# Patient Record
Sex: FEMALE | Race: WHITE | NOT HISPANIC OR LATINO | Employment: OTHER | URBAN - METROPOLITAN AREA
[De-identification: names, ages, dates, MRNs, and addresses within clinical notes are randomized per-mention and may not be internally consistent; named-entity substitution may affect disease eponyms.]

---

## 2018-06-14 ENCOUNTER — APPOINTMENT (OUTPATIENT)
Dept: WOMENS IMAGING | Facility: HOSPITAL | Age: 73
End: 2018-06-14

## 2018-06-14 PROCEDURE — 77067 SCR MAMMO BI INCL CAD: CPT | Performed by: RADIOLOGY

## 2018-06-14 PROCEDURE — 77063 BREAST TOMOSYNTHESIS BI: CPT | Performed by: RADIOLOGY

## 2019-07-09 ENCOUNTER — APPOINTMENT (OUTPATIENT)
Dept: WOMENS IMAGING | Facility: HOSPITAL | Age: 74
End: 2019-07-09

## 2019-07-09 PROCEDURE — 77063 BREAST TOMOSYNTHESIS BI: CPT | Performed by: RADIOLOGY

## 2019-07-09 PROCEDURE — 77067 SCR MAMMO BI INCL CAD: CPT | Performed by: RADIOLOGY

## 2020-08-06 ENCOUNTER — APPOINTMENT (OUTPATIENT)
Dept: WOMENS IMAGING | Facility: HOSPITAL | Age: 75
End: 2020-08-06

## 2020-08-06 PROCEDURE — 77067 SCR MAMMO BI INCL CAD: CPT | Performed by: RADIOLOGY

## 2020-08-06 PROCEDURE — 77063 BREAST TOMOSYNTHESIS BI: CPT | Performed by: RADIOLOGY

## 2021-07-22 ENCOUNTER — HOSPITAL ENCOUNTER (EMERGENCY)
Facility: HOSPITAL | Age: 76
Discharge: HOME | End: 2021-07-22
Attending: EMERGENCY MEDICINE | Admitting: EMERGENCY MEDICINE

## 2021-07-22 VITALS
DIASTOLIC BLOOD PRESSURE: 92 MMHG | OXYGEN SATURATION: 95 % | HEIGHT: 57 IN | TEMPERATURE: 97.9 F | SYSTOLIC BLOOD PRESSURE: 176 MMHG | HEART RATE: 119 BPM | RESPIRATION RATE: 18 BRPM
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
## 2021-07-22 PROCEDURE — 99281 EMR DPT VST MAYX REQ PHY/QHP: CPT | Performed by: EMERGENCY MEDICINE

## 2021-07-22 PROCEDURE — 99283 EMERGENCY DEPT VISIT LOW MDM: CPT

## 2021-08-03 NOTE — ED PROVIDER NOTES
History  Chief Complaint   Patient presents with    Fall     dizzy and fall to ground injuring knee, family reports increased stress,  is also here, denies hitting head or LOC      65 yo f presenting to the emergency department for eval of fall  Pt is here with her  who is in the ER, daughter suggested she be evaluated for a fall that occurred several days ago  I introduced myself to the pt and she declined further eval and denies any sx presently  None       No past medical history on file  No past surgical history on file  No family history on file  I have reviewed and agree with the history as documented  No existing history information found  No existing history information found  Social History     Tobacco Use    Smoking status: Not on file   Substance Use Topics    Alcohol use: Not on file    Drug use: Not on file       Review of Systems   Unable to perform ROS: Other       Physical Exam  Physical Exam  Constitutional:       Appearance: Normal appearance  She is not ill-appearing  HENT:      Head: Normocephalic and atraumatic  Pulmonary:      Effort: Pulmonary effort is normal    Musculoskeletal:         General: No deformity  Skin:     Coloration: Skin is not pale  Neurological:      General: No focal deficit present  Mental Status: She is alert and oriented to person, place, and time     Psychiatric:         Mood and Affect: Mood normal          Vital Signs  ED Triage Vitals [07/22/21 1748]   Temperature Pulse Respirations Blood Pressure SpO2   97 9 °F (36 6 °C) (!) 119 18 (!) 176/92 95 %      Temp Source Heart Rate Source Patient Position - Orthostatic VS BP Location FiO2 (%)   Oral Monitor Sitting Left arm --      Pain Score       --           Vitals:    07/22/21 1748   BP: (!) 176/92   Pulse: (!) 119   Patient Position - Orthostatic VS: Sitting         Visual Acuity      ED Medications  Medications - No data to display    Diagnostic Studies  Results Reviewed     None                 No orders to display              Procedures  Procedures         ED Course                             SBIRT 22yo+      Most Recent Value   SBIRT (22 yo +)   In order to provide better care to our patients, we are screening all of our patients for alcohol and drug use  Would it be okay to ask you these screening questions? Yes Filed at: 07/22/2021 1753   Initial Alcohol Screen: US AUDIT-C    1  How often do you have a drink containing alcohol?  0 Filed at: 07/22/2021 1753   2  How many drinks containing alcohol do you have on a typical day you are drinking? 0 Filed at: 07/22/2021 1753   3b  FEMALE Any Age, or MALE 65+: How often do you have 4 or more drinks on one occassion? 0 Filed at: 07/22/2021 1753   Audit-C Score  0 Filed at: 07/22/2021 1753   [place]: How many times in the past year have you    Used an illegal drug or used a prescription medication for non-medical reasons? Never Filed at: 07/22/2021 1753                    MDM  Number of Diagnoses or Management Options  Fall  Diagnosis management comments: 65 yo f with limited eval here,  No complaints, requesting no further workup at this time  Disposition  Final diagnoses:   Fall     Time reflects when diagnosis was documented in both MDM as applicable and the Disposition within this note     Time User Action Codes Description Comment    7/22/2021  6:02 PM Harika Guzman Add [U65  XXXA] Fall       ED Disposition     ED Disposition Condition Date/Time Comment    Discharge Stable u Jul 22, 2021  6:00 PM Rachael Gibbs discharge to home/self care  Follow-up Information    None         There are no discharge medications for this patient  No discharge procedures on file      PDMP Review     None          ED Provider  Electronically Signed by           Indira Escobedo MD  08/03/21 0020

## 2021-09-01 ENCOUNTER — APPOINTMENT (OUTPATIENT)
Dept: WOMENS IMAGING | Facility: HOSPITAL | Age: 76
End: 2021-09-01

## 2021-09-01 PROCEDURE — 77067 SCR MAMMO BI INCL CAD: CPT | Performed by: RADIOLOGY

## 2021-09-01 PROCEDURE — 77063 BREAST TOMOSYNTHESIS BI: CPT | Performed by: RADIOLOGY

## 2022-12-07 ENCOUNTER — APPOINTMENT (OUTPATIENT)
Dept: WOMENS IMAGING | Facility: HOSPITAL | Age: 77
End: 2022-12-07

## 2022-12-07 PROCEDURE — 77067 SCR MAMMO BI INCL CAD: CPT | Performed by: RADIOLOGY

## 2022-12-07 PROCEDURE — 77063 BREAST TOMOSYNTHESIS BI: CPT | Performed by: RADIOLOGY

## 2023-04-04 ENCOUNTER — OFFICE VISIT (OUTPATIENT)
Dept: NEUROSURGERY | Facility: CLINIC | Age: 78
End: 2023-04-04
Payer: MEDICARE

## 2023-04-04 VITALS — WEIGHT: 140 LBS | HEIGHT: 63 IN | BODY MASS INDEX: 24.8 KG/M2 | TEMPERATURE: 97.7 F

## 2023-04-04 DIAGNOSIS — M47.812 CERVICAL SPONDYLOSIS: Primary | ICD-10-CM

## 2023-04-04 DIAGNOSIS — M47.819 FACET ARTHROPATHY: ICD-10-CM

## 2023-04-04 DIAGNOSIS — M50.30 DDD (DEGENERATIVE DISC DISEASE), CERVICAL: ICD-10-CM

## 2023-04-04 PROCEDURE — 1159F MED LIST DOCD IN RCRD: CPT | Performed by: NEUROLOGICAL SURGERY

## 2023-04-04 PROCEDURE — 99203 OFFICE O/P NEW LOW 30 MIN: CPT | Performed by: NEUROLOGICAL SURGERY

## 2023-04-04 PROCEDURE — 1160F RVW MEDS BY RX/DR IN RCRD: CPT | Performed by: NEUROLOGICAL SURGERY

## 2023-04-04 RX ORDER — FLUTICASONE PROPIONATE 50 MCG
2 SPRAY, SUSPENSION (ML) NASAL DAILY
COMMUNITY
Start: 2022-12-27 | End: 2023-04-04

## 2023-04-04 RX ORDER — PROMETHAZINE HYDROCHLORIDE 25 MG/1
TABLET ORAL
COMMUNITY
Start: 2023-03-23

## 2023-04-04 RX ORDER — TRAMADOL HYDROCHLORIDE 50 MG/1
TABLET ORAL
COMMUNITY
Start: 2023-03-23

## 2023-04-04 RX ORDER — OMEPRAZOLE 20 MG/1
1 CAPSULE, DELAYED RELEASE ORAL DAILY
COMMUNITY
Start: 2023-02-09

## 2023-04-04 RX ORDER — LOVASTATIN 40 MG/1
40 TABLET ORAL
COMMUNITY
Start: 2023-02-09

## 2023-04-04 RX ORDER — LEVOTHYROXINE SODIUM 0.03 MG/1
25 TABLET ORAL DAILY
COMMUNITY
Start: 2013-01-10

## 2023-04-04 RX ORDER — CETIRIZINE HYDROCHLORIDE 10 MG/1
TABLET ORAL
COMMUNITY
Start: 2023-01-02

## 2023-04-04 RX ORDER — AMLODIPINE BESYLATE 10 MG/1
TABLET ORAL
COMMUNITY
Start: 2013-01-10

## 2023-04-04 NOTE — PROGRESS NOTES
Patient: Jillian Acevedo  : 1945    Primary Care Provider: Krystle Bell DO    Requesting Provider: As above        History    Chief Complaint: Left neck and shoulder pain, improved.    History of Present Illness: Ms. Acevedo is a 77-year-old woman who couple of years ago had a similar bout that improved.  In early March she began experiencing some discomfort in her left dorsal shoulder that spread into her neck.  She was seen in the emergency room due to severe pain.  She has had no arm symptoms.  She did 3 physical therapy sessions and was treated with medicine.  Her pain is very minimal at this point.  She continues to take tramadol.  She complains of some nausea as well as dizziness.    Review of Systems   Constitutional: Positive for activity change, appetite change, fatigue and unexpected weight change. Negative for chills, diaphoresis and fever.   HENT: Negative for congestion, dental problem, drooling, ear discharge, ear pain, facial swelling, hearing loss, mouth sores, nosebleeds, postnasal drip, rhinorrhea, sinus pressure, sneezing, sore throat, tinnitus, trouble swallowing and voice change.    Eyes: Negative for photophobia, pain, discharge, redness, itching and visual disturbance.   Respiratory: Negative for apnea, cough, choking, chest tightness, shortness of breath, wheezing and stridor.    Cardiovascular: Negative for chest pain, palpitations and leg swelling.   Gastrointestinal: Positive for abdominal pain, constipation and nausea. Negative for abdominal distention, anal bleeding, blood in stool, diarrhea, rectal pain and vomiting.   Endocrine: Positive for cold intolerance. Negative for heat intolerance, polydipsia, polyphagia and polyuria.   Genitourinary: Positive for difficulty urinating and frequency. Negative for decreased urine volume, dysuria, enuresis, flank pain, genital sores, hematuria and urgency.   Musculoskeletal: Positive for back pain, gait problem, neck pain and neck stiffness.  "Negative for arthralgias, joint swelling and myalgias.   Skin: Positive for pallor. Negative for color change, rash and wound.   Allergic/Immunologic: Positive for environmental allergies. Negative for food allergies and immunocompromised state.   Neurological: Positive for dizziness and light-headedness. Negative for tremors, seizures, syncope, facial asymmetry, speech difficulty, weakness, numbness and headaches.   Hematological: Negative for adenopathy. Bruises/bleeds easily.   Psychiatric/Behavioral: Positive for confusion and decreased concentration. Negative for agitation, behavioral problems, dysphoric mood, hallucinations, self-injury, sleep disturbance and suicidal ideas. The patient is nervous/anxious. The patient is not hyperactive.    All other systems reviewed and are negative.      The patient's past medical history, past surgical history, family history, and social history have been reviewed at length in the electronic medical record.      Physical Exam:   Temp 97.7 °F (36.5 °C) (Infrared)   Ht 160 cm (63\")   Wt 63.5 kg (140 lb)   BMI 24.80 kg/m²   CONSTITUTIONAL: Patient is well-nourished, pleasant and appears stated age.  MUSCULOSKELETAL:  Neck tenderness to palpation is not observed.   ROM in the neck is normal.  NEUROLOGICAL:  Orientation, memory, attention span, language function, and cognition have been examined and are intact.  Strength is intact in the upper and lower extremities to direct testing.  Muscle tone is normal throughout.  Station and gait are normal.  Sensation is intact to light touch testing throughout.  Deep tendon reflexes are 2+ and symmetrical.  Leo's Sign is negative bilaterally. No clonus is elicited.  Coordination is intact.      Medical Decision Making    Data Review:   (All imaging studies were personally reviewed unless stated otherwise)  MRI of the cervical spine dated 3/10/2023 demonstrates some diffuse spondylosis.  Bilateral uncinate overgrowth narrows the " recesses at C3-4 where there is a low-grade listhesis.  High-grade root or certainly cord compromise is not observed.    Diagnosis:   The patient has had some mechanical neck pain.  A high cervical radiculopathy is not out of the realm of possibility.  Fortunately she is feeling better.    Treatment Options:   I have recommended that she continue her therapy for couple more weeks and then a home exercise/stretching regimen.  She will slowly wean off of the tramadol.  If symptoms recur in a severe fashion then I would be happy to reevaluate her.       Diagnosis Plan   1. Cervical spondylosis        2. DDD (degenerative disc disease), cervical        3. Facet arthropathy            Scribed for Teodoro Faustin MD by Senait Alvarenga CMA on 4/4/2023 12:24 EDT       I, Dr. Faustin, personally performed the services described in the documentation, as scribed in my presence, and it is both accurate and complete.

## 2023-04-06 ENCOUNTER — PATIENT ROUNDING (BHMG ONLY) (OUTPATIENT)
Dept: NEUROSURGERY | Facility: CLINIC | Age: 78
End: 2023-04-06
Payer: MEDICARE

## 2023-04-06 NOTE — PROGRESS NOTES
A MY-CHART MESSAGE HAS BEEN SENT TO THE PATIENT FOR PATIENT ROUNDING WITH [facility].

## 2024-01-28 ENCOUNTER — APPOINTMENT (EMERGENCY)
Dept: RADIOLOGY | Facility: HOSPITAL | Age: 79
DRG: 853 | End: 2024-01-28
Payer: MEDICARE

## 2024-01-28 ENCOUNTER — APPOINTMENT (EMERGENCY)
Dept: CT IMAGING | Facility: HOSPITAL | Age: 79
DRG: 853 | End: 2024-01-28
Payer: MEDICARE

## 2024-01-28 ENCOUNTER — HOSPITAL ENCOUNTER (INPATIENT)
Facility: HOSPITAL | Age: 79
LOS: 10 days | Discharge: NURSING, REHAB OR LONG-TERM CARE | DRG: 853 | End: 2024-02-08
Attending: EMERGENCY MEDICINE | Admitting: INTERNAL MEDICINE
Payer: MEDICARE

## 2024-01-28 DIAGNOSIS — I21.4 NSTEMI (NON-ST ELEVATED MYOCARDIAL INFARCTION) (HCC): ICD-10-CM

## 2024-01-28 DIAGNOSIS — N17.9 AKI (ACUTE KIDNEY INJURY) (HCC): ICD-10-CM

## 2024-01-28 DIAGNOSIS — E87.8 ELECTROLYTE ABNORMALITY: ICD-10-CM

## 2024-01-28 DIAGNOSIS — N39.0 UTI (URINARY TRACT INFECTION): ICD-10-CM

## 2024-01-28 DIAGNOSIS — D69.6 THROMBOCYTOPENIA (HCC): ICD-10-CM

## 2024-01-28 DIAGNOSIS — R65.20 SEVERE SEPSIS (HCC): Primary | ICD-10-CM

## 2024-01-28 DIAGNOSIS — I10 PRIMARY HYPERTENSION: ICD-10-CM

## 2024-01-28 DIAGNOSIS — N20.1 RIGHT URETERAL CALCULUS: ICD-10-CM

## 2024-01-28 DIAGNOSIS — D62 ANEMIA DUE TO ACUTE BLOOD LOSS: ICD-10-CM

## 2024-01-28 DIAGNOSIS — D72.825 BANDEMIA: ICD-10-CM

## 2024-01-28 DIAGNOSIS — R79.89 ELEVATED TROPONIN: ICD-10-CM

## 2024-01-28 DIAGNOSIS — N20.0 NEPHROLITHIASIS: ICD-10-CM

## 2024-01-28 DIAGNOSIS — A41.9 SEVERE SEPSIS (HCC): Primary | ICD-10-CM

## 2024-01-28 DIAGNOSIS — D72.829 LEUKOCYTOSIS: ICD-10-CM

## 2024-01-28 DIAGNOSIS — K21.00 REFLUX ESOPHAGITIS: ICD-10-CM

## 2024-01-28 DIAGNOSIS — R19.5 HEME POSITIVE STOOL: ICD-10-CM

## 2024-01-28 DIAGNOSIS — R06.82 TACHYPNEA: ICD-10-CM

## 2024-01-28 DIAGNOSIS — N11.1 OBSTRUCTIVE PYELONEPHRITIS: ICD-10-CM

## 2024-01-28 DIAGNOSIS — G47.00 INSOMNIA: ICD-10-CM

## 2024-01-28 DIAGNOSIS — R19.5 DARK STOOLS: ICD-10-CM

## 2024-01-28 DIAGNOSIS — B37.9 CANDIDA INFECTION: ICD-10-CM

## 2024-01-28 DIAGNOSIS — R09.02 HYPOXIA: ICD-10-CM

## 2024-01-28 DIAGNOSIS — R06.89 ACUTE RESPIRATORY INSUFFICIENCY: ICD-10-CM

## 2024-01-28 LAB
ABO GROUP BLD: NORMAL
ABO GROUP BLD: NORMAL
ALBUMIN SERPL BCP-MCNC: 3.6 G/DL (ref 3.5–5)
ALP SERPL-CCNC: 69 U/L (ref 34–104)
ALT SERPL W P-5'-P-CCNC: 11 U/L (ref 7–52)
ANION GAP SERPL CALCULATED.3IONS-SCNC: 13 MMOL/L
APTT PPP: 36 SECONDS (ref 23–37)
AST SERPL W P-5'-P-CCNC: 46 U/L (ref 13–39)
BACTERIA UR QL AUTO: ABNORMAL /HPF
BASE EX.OXY STD BLDV CALC-SCNC: 62.4 % (ref 60–80)
BASE EXCESS BLDV CALC-SCNC: -2.6 MMOL/L
BASOPHILS # BLD MANUAL: 0 THOUSAND/UL (ref 0–0.1)
BASOPHILS NFR MAR MANUAL: 0 % (ref 0–1)
BILIRUB SERPL-MCNC: 0.71 MG/DL (ref 0.2–1)
BILIRUB UR QL STRIP: NEGATIVE
BLD GP AB SCN SERPL QL: NEGATIVE
BUDDING YEAST: PRESENT
BUN SERPL-MCNC: 34 MG/DL (ref 5–25)
CALCIUM SERPL-MCNC: 8.8 MG/DL (ref 8.4–10.2)
CHLORIDE SERPL-SCNC: 99 MMOL/L (ref 96–108)
CLARITY UR: ABNORMAL
CO2 SERPL-SCNC: 23 MMOL/L (ref 21–32)
COLOR UR: YELLOW
CREAT SERPL-MCNC: 2.58 MG/DL (ref 0.6–1.3)
EOSINOPHIL # BLD MANUAL: 0 THOUSAND/UL (ref 0–0.4)
EOSINOPHIL NFR BLD MANUAL: 0 % (ref 0–6)
ERYTHROCYTE [DISTWIDTH] IN BLOOD BY AUTOMATED COUNT: 13.9 % (ref 11.6–15.1)
FLUAV RNA RESP QL NAA+PROBE: NEGATIVE
FLUBV RNA RESP QL NAA+PROBE: NEGATIVE
GFR SERPL CREATININE-BSD FRML MDRD: 16 ML/MIN/1.73SQ M
GLUCOSE SERPL-MCNC: 84 MG/DL (ref 65–140)
GLUCOSE UR STRIP-MCNC: NEGATIVE MG/DL
HCO3 BLDV-SCNC: 22.8 MMOL/L (ref 24–30)
HCT VFR BLD AUTO: 42.3 % (ref 34.8–46.1)
HGB BLD-MCNC: 13.7 G/DL (ref 11.5–15.4)
HGB UR QL STRIP.AUTO: ABNORMAL
INR PPP: 1.22 (ref 0.84–1.19)
KETONES UR STRIP-MCNC: ABNORMAL MG/DL
LACTATE SERPL-SCNC: 3.8 MMOL/L (ref 0.5–2)
LEUKOCYTE ESTERASE UR QL STRIP: ABNORMAL
LYMPHOCYTES # BLD AUTO: 0.82 THOUSAND/UL (ref 0.6–4.47)
LYMPHOCYTES # BLD AUTO: 2 % (ref 14–44)
MCH RBC QN AUTO: 31.7 PG (ref 26.8–34.3)
MCHC RBC AUTO-ENTMCNC: 32.4 G/DL (ref 31.4–37.4)
MCV RBC AUTO: 98 FL (ref 82–98)
METAMYELOCYTES NFR BLD MANUAL: 3 % (ref 0–1)
MONOCYTES # BLD AUTO: 0.82 THOUSAND/UL (ref 0–1.22)
MONOCYTES NFR BLD: 3 % (ref 4–12)
MUCOUS THREADS UR QL AUTO: ABNORMAL
MYELOCYTES NFR BLD MANUAL: 1 % (ref 0–1)
NEUTROPHILS # BLD MANUAL: 24.61 THOUSAND/UL (ref 1.85–7.62)
NEUTS BAND NFR BLD MANUAL: 23 % (ref 0–8)
NEUTS SEG NFR BLD AUTO: 67 % (ref 43–75)
NITRITE UR QL STRIP: NEGATIVE
NON-SQ EPI CELLS URNS QL MICRO: ABNORMAL /HPF
O2 CT BLDV-SCNC: 12.9 ML/DL
PCO2 BLDV: 41.4 MM HG (ref 42–50)
PH BLDV: 7.36 [PH] (ref 7.3–7.4)
PH UR STRIP.AUTO: 5.5 [PH]
PLATELET # BLD AUTO: 148 THOUSANDS/UL (ref 149–390)
PLATELET BLD QL SMEAR: ADEQUATE
PMV BLD AUTO: 11.4 FL (ref 8.9–12.7)
PO2 BLDV: 33.5 MM HG (ref 35–45)
POTASSIUM SERPL-SCNC: 3.7 MMOL/L (ref 3.5–5.3)
PROCALCITONIN SERPL-MCNC: 230.37 NG/ML
PROT SERPL-MCNC: 7 G/DL (ref 6.4–8.4)
PROT UR STRIP-MCNC: ABNORMAL MG/DL
PROTHROMBIN TIME: 16.1 SECONDS (ref 11.6–14.5)
RBC # BLD AUTO: 4.32 MILLION/UL (ref 3.81–5.12)
RBC #/AREA URNS AUTO: ABNORMAL /HPF
RBC MORPH BLD: PRESENT
RH BLD: NEGATIVE
RH BLD: NEGATIVE
RSV RNA RESP QL NAA+PROBE: NEGATIVE
SARS-COV-2 RNA RESP QL NAA+PROBE: NEGATIVE
SODIUM SERPL-SCNC: 135 MMOL/L (ref 135–147)
SP GR UR STRIP.AUTO: 1.02 (ref 1–1.03)
SPECIMEN EXPIRATION DATE: NORMAL
STOMATOCYTES BLD QL SMEAR: PRESENT
UROBILINOGEN UR STRIP-ACNC: <2 MG/DL
VARIANT LYMPHS # BLD AUTO: 1 %
WBC # BLD AUTO: 27.34 THOUSAND/UL (ref 4.31–10.16)
WBC #/AREA URNS AUTO: ABNORMAL /HPF
WBC CLUMPS # UR AUTO: PRESENT /UL

## 2024-01-28 PROCEDURE — 86900 BLOOD TYPING SEROLOGIC ABO: CPT

## 2024-01-28 PROCEDURE — 36415 COLL VENOUS BLD VENIPUNCTURE: CPT

## 2024-01-28 PROCEDURE — 86850 RBC ANTIBODY SCREEN: CPT

## 2024-01-28 PROCEDURE — 81001 URINALYSIS AUTO W/SCOPE: CPT | Performed by: EMERGENCY MEDICINE

## 2024-01-28 PROCEDURE — 85007 BL SMEAR W/DIFF WBC COUNT: CPT

## 2024-01-28 PROCEDURE — 85610 PROTHROMBIN TIME: CPT

## 2024-01-28 PROCEDURE — 84145 PROCALCITONIN (PCT): CPT

## 2024-01-28 PROCEDURE — 99285 EMERGENCY DEPT VISIT HI MDM: CPT

## 2024-01-28 PROCEDURE — 96367 TX/PROPH/DG ADDL SEQ IV INF: CPT

## 2024-01-28 PROCEDURE — 82805 BLOOD GASES W/O2 SATURATION: CPT | Performed by: EMERGENCY MEDICINE

## 2024-01-28 PROCEDURE — 71250 CT THORAX DX C-: CPT

## 2024-01-28 PROCEDURE — 87077 CULTURE AEROBIC IDENTIFY: CPT | Performed by: EMERGENCY MEDICINE

## 2024-01-28 PROCEDURE — 85027 COMPLETE CBC AUTOMATED: CPT

## 2024-01-28 PROCEDURE — 71045 X-RAY EXAM CHEST 1 VIEW: CPT

## 2024-01-28 PROCEDURE — 0241U HB NFCT DS VIR RESP RNA 4 TRGT: CPT | Performed by: EMERGENCY MEDICINE

## 2024-01-28 PROCEDURE — 83605 ASSAY OF LACTIC ACID: CPT

## 2024-01-28 PROCEDURE — 87154 CUL TYP ID BLD PTHGN 6+ TRGT: CPT

## 2024-01-28 PROCEDURE — G1004 CDSM NDSC: HCPCS

## 2024-01-28 PROCEDURE — 85730 THROMBOPLASTIN TIME PARTIAL: CPT

## 2024-01-28 PROCEDURE — 93005 ELECTROCARDIOGRAM TRACING: CPT

## 2024-01-28 PROCEDURE — 96365 THER/PROPH/DIAG IV INF INIT: CPT

## 2024-01-28 PROCEDURE — 87040 BLOOD CULTURE FOR BACTERIA: CPT

## 2024-01-28 PROCEDURE — 86901 BLOOD TYPING SEROLOGIC RH(D): CPT

## 2024-01-28 PROCEDURE — 96361 HYDRATE IV INFUSION ADD-ON: CPT

## 2024-01-28 PROCEDURE — 80053 COMPREHEN METABOLIC PANEL: CPT

## 2024-01-28 PROCEDURE — 96375 TX/PRO/DX INJ NEW DRUG ADDON: CPT

## 2024-01-28 PROCEDURE — 99291 CRITICAL CARE FIRST HOUR: CPT | Performed by: EMERGENCY MEDICINE

## 2024-01-28 PROCEDURE — 87186 SC STD MICRODIL/AGAR DIL: CPT | Performed by: EMERGENCY MEDICINE

## 2024-01-28 PROCEDURE — 94640 AIRWAY INHALATION TREATMENT: CPT

## 2024-01-28 PROCEDURE — 74176 CT ABD & PELVIS W/O CONTRAST: CPT

## 2024-01-28 PROCEDURE — 87086 URINE CULTURE/COLONY COUNT: CPT | Performed by: EMERGENCY MEDICINE

## 2024-01-28 RX ORDER — ACETAMINOPHEN 10 MG/ML
1000 INJECTION, SOLUTION INTRAVENOUS ONCE
Status: COMPLETED | OUTPATIENT
Start: 2024-01-28 | End: 2024-01-28

## 2024-01-28 RX ADMIN — SODIUM CHLORIDE 500 ML: 0.9 INJECTION, SOLUTION INTRAVENOUS at 23:19

## 2024-01-28 RX ADMIN — VANCOMYCIN HYDROCHLORIDE 1750 MG: 5 INJECTION, POWDER, LYOPHILIZED, FOR SOLUTION INTRAVENOUS at 23:52

## 2024-01-28 RX ADMIN — ACETAMINOPHEN 1000 MG: 10 INJECTION INTRAVENOUS at 21:48

## 2024-01-28 RX ADMIN — CEFEPIME 2000 MG: 2 INJECTION, POWDER, FOR SOLUTION INTRAVENOUS at 22:16

## 2024-01-29 ENCOUNTER — ANESTHESIA (INPATIENT)
Dept: PERIOP | Facility: HOSPITAL | Age: 79
DRG: 853 | End: 2024-01-29
Payer: MEDICARE

## 2024-01-29 ENCOUNTER — PREP FOR PROCEDURE (OUTPATIENT)
Dept: UROLOGY | Facility: CLINIC | Age: 79
End: 2024-01-29

## 2024-01-29 ENCOUNTER — ANESTHESIA EVENT (INPATIENT)
Dept: PERIOP | Facility: HOSPITAL | Age: 79
DRG: 853 | End: 2024-01-29
Payer: MEDICARE

## 2024-01-29 ENCOUNTER — APPOINTMENT (INPATIENT)
Dept: RADIOLOGY | Facility: HOSPITAL | Age: 79
DRG: 853 | End: 2024-01-29
Payer: MEDICARE

## 2024-01-29 ENCOUNTER — APPOINTMENT (INPATIENT)
Dept: NON INVASIVE DIAGNOSTICS | Facility: HOSPITAL | Age: 79
DRG: 853 | End: 2024-01-29
Payer: MEDICARE

## 2024-01-29 DIAGNOSIS — N20.1 RIGHT URETERAL STONE: Primary | ICD-10-CM

## 2024-01-29 PROBLEM — N11.1 OBSTRUCTIVE PYELONEPHRITIS: Status: ACTIVE | Noted: 2024-01-29

## 2024-01-29 PROBLEM — R79.89 ELEVATED TROPONIN: Status: ACTIVE | Noted: 2024-01-29

## 2024-01-29 PROBLEM — N17.9 AKI (ACUTE KIDNEY INJURY) (HCC): Status: ACTIVE | Noted: 2024-01-29

## 2024-01-29 PROBLEM — A41.9 SEVERE SEPSIS (HCC): Status: ACTIVE | Noted: 2024-01-29

## 2024-01-29 PROBLEM — R65.20 SEVERE SEPSIS (HCC): Status: ACTIVE | Noted: 2024-01-29

## 2024-01-29 PROBLEM — R06.89 ACUTE RESPIRATORY INSUFFICIENCY: Status: ACTIVE | Noted: 2024-01-29

## 2024-01-29 LAB
2HR DELTA HS TROPONIN: -3458 NG/L
4HR DELTA HS TROPONIN: -2088 NG/L
ALBUMIN SERPL BCP-MCNC: 3.3 G/DL (ref 3.5–5)
ALP SERPL-CCNC: 60 U/L (ref 34–104)
ALT SERPL W P-5'-P-CCNC: 16 U/L (ref 7–52)
ANION GAP SERPL CALCULATED.3IONS-SCNC: 11 MMOL/L
ANION GAP SERPL CALCULATED.3IONS-SCNC: 11 MMOL/L
AORTIC ROOT: 3 CM
AORTIC VALVE MEAN VELOCITY: 10.6 M/S
APICAL FOUR CHAMBER EJECTION FRACTION: 66 %
APTT PPP: 162 SECONDS (ref 23–37)
APTT PPP: 45 SECONDS (ref 23–37)
ARTERIAL PATENCY WRIST A: YES
ASCENDING AORTA: 3.6 CM
AST SERPL W P-5'-P-CCNC: 64 U/L (ref 13–39)
ATRIAL RATE: 103 BPM
ATRIAL RATE: 117 BPM
AV AREA BY CONTINUOUS VTI: 2.8 CM2
AV AREA PEAK VELOCITY: 2.9 CM2
AV LVOT MEAN GRADIENT: 4 MMHG
AV LVOT PEAK GRADIENT: 7 MMHG
AV MEAN GRADIENT: 5 MMHG
AV PEAK GRADIENT: 9 MMHG
AV VALVE AREA: 2.83 CM2
AV VELOCITY RATIO: 0.91
BASE EXCESS BLDA CALC-SCNC: -4.8 MMOL/L
BASOPHILS # BLD AUTO: 0.19 THOUSANDS/ÂΜL (ref 0–0.1)
BASOPHILS NFR BLD AUTO: 1 % (ref 0–1)
BILIRUB SERPL-MCNC: 0.81 MG/DL (ref 0.2–1)
BNP SERPL-MCNC: 2737 PG/ML (ref 0–100)
BSA FOR ECHO PROCEDURE: 1.94 M2
BUN SERPL-MCNC: 37 MG/DL (ref 5–25)
BUN SERPL-MCNC: 40 MG/DL (ref 5–25)
CA-I BLD-SCNC: 1.01 MMOL/L (ref 1.12–1.32)
CA-I BLD-SCNC: 1.03 MMOL/L (ref 1.12–1.32)
CALCIUM ALBUM COR SERPL-MCNC: 8.5 MG/DL (ref 8.3–10.1)
CALCIUM SERPL-MCNC: 7.9 MG/DL (ref 8.4–10.2)
CALCIUM SERPL-MCNC: 7.9 MG/DL (ref 8.4–10.2)
CARDIAC TROPONIN I PNL SERPL HS: ABNORMAL NG/L
CHLORIDE SERPL-SCNC: 100 MMOL/L (ref 96–108)
CHLORIDE SERPL-SCNC: 100 MMOL/L (ref 96–108)
CHOLEST SERPL-MCNC: 205 MG/DL
CO2 SERPL-SCNC: 23 MMOL/L (ref 21–32)
CO2 SERPL-SCNC: 23 MMOL/L (ref 21–32)
CREAT SERPL-MCNC: 2.75 MG/DL (ref 0.6–1.3)
CREAT SERPL-MCNC: 2.8 MG/DL (ref 0.6–1.3)
DOP CALC AO PEAK VEL: 1.49 M/S
DOP CALC AO VTI: 27.89 CM
DOP CALC LVOT AREA: 3.14 CM2
DOP CALC LVOT CARDIAC INDEX: 4.48 L/MIN/M2
DOP CALC LVOT CARDIAC OUTPUT: 8.74 L/MIN
DOP CALC LVOT DIAMETER: 2 CM
DOP CALC LVOT PEAK VEL VTI: 25.12 CM
DOP CALC LVOT PEAK VEL: 1.36 M/S
DOP CALC LVOT STROKE INDEX: 41.5 ML/M2
DOP CALC LVOT STROKE VOLUME: 78.88
E WAVE DECELERATION TIME: 118 MS
E/A RATIO: 0.84
EOSINOPHIL # BLD AUTO: 0 THOUSAND/ÂΜL (ref 0–0.61)
EOSINOPHIL NFR BLD AUTO: 0 % (ref 0–6)
ERYTHROCYTE [DISTWIDTH] IN BLOOD BY AUTOMATED COUNT: 14.2 % (ref 11.6–15.1)
ERYTHROCYTE [DISTWIDTH] IN BLOOD BY AUTOMATED COUNT: 14.3 % (ref 11.6–15.1)
EST. AVERAGE GLUCOSE BLD GHB EST-MCNC: 105 MG/DL
FRACTIONAL SHORTENING: 31 (ref 28–44)
GFR SERPL CREATININE-BSD FRML MDRD: 15 ML/MIN/1.73SQ M
GFR SERPL CREATININE-BSD FRML MDRD: 15 ML/MIN/1.73SQ M
GLUCOSE SERPL-MCNC: 74 MG/DL (ref 65–140)
GLUCOSE SERPL-MCNC: 86 MG/DL (ref 65–140)
HBA1C MFR BLD: 5.3 %
HCO3 BLDA-SCNC: 21.1 MMOL/L (ref 22–28)
HCT VFR BLD AUTO: 39.1 % (ref 34.8–46.1)
HCT VFR BLD AUTO: 39.2 % (ref 34.8–46.1)
HDLC SERPL-MCNC: 37 MG/DL
HGB BLD-MCNC: 12.6 G/DL (ref 11.5–15.4)
HGB BLD-MCNC: 12.8 G/DL (ref 11.5–15.4)
IMM GRANULOCYTES # BLD AUTO: >0.5 THOUSAND/UL (ref 0–0.2)
IMM GRANULOCYTES NFR BLD AUTO: 6 % (ref 0–2)
INR PPP: 1.43 (ref 0.84–1.19)
INTERVENTRICULAR SEPTUM IN DIASTOLE (PARASTERNAL SHORT AXIS VIEW): 1.5 CM
INTERVENTRICULAR SEPTUM: 1.5 CM (ref 0.6–1.1)
LAAS-AP2: 15.9 CM2
LAAS-AP4: 22 CM2
LACTATE SERPL-SCNC: 1.9 MMOL/L (ref 0.5–2)
LACTATE SERPL-SCNC: 2 MMOL/L (ref 0.5–2)
LACTATE SERPL-SCNC: 2.4 MMOL/L (ref 0.5–2)
LDLC SERPL CALC-MCNC: 123 MG/DL (ref 0–100)
LEFT ATRIUM SIZE: 2.1 CM
LEFT ATRIUM VOLUME (MOD BIPLANE): 51 ML
LEFT ATRIUM VOLUME INDEX (MOD BIPLANE): 26.2 ML/M2
LEFT INTERNAL DIMENSION IN SYSTOLE: 2.7 CM (ref 2.1–4)
LEFT VENTRICULAR INTERNAL DIMENSION IN DIASTOLE: 3.9 CM (ref 3.5–6)
LEFT VENTRICULAR POSTERIOR WALL IN END DIASTOLE: 1.4 CM
LEFT VENTRICULAR STROKE VOLUME: 38 ML
LVSV (TEICH): 38 ML
LYMPHOCYTES # BLD AUTO: 0.4 THOUSANDS/ÂΜL (ref 0.6–4.47)
LYMPHOCYTES NFR BLD AUTO: 2 % (ref 14–44)
MAGNESIUM SERPL-MCNC: 1.5 MG/DL (ref 1.9–2.7)
MAGNESIUM SERPL-MCNC: 2.2 MG/DL (ref 1.9–2.7)
MCH RBC QN AUTO: 32.3 PG (ref 26.8–34.3)
MCH RBC QN AUTO: 32.5 PG (ref 26.8–34.3)
MCHC RBC AUTO-ENTMCNC: 32.1 G/DL (ref 31.4–37.4)
MCHC RBC AUTO-ENTMCNC: 32.7 G/DL (ref 31.4–37.4)
MCV RBC AUTO: 101 FL (ref 82–98)
MCV RBC AUTO: 99 FL (ref 82–98)
MONOCYTES # BLD AUTO: 1.36 THOUSAND/ÂΜL (ref 0.17–1.22)
MONOCYTES NFR BLD AUTO: 5 % (ref 4–12)
MV E'TISSUE VEL-SEP: 9 CM/S
MV PEAK A VEL: 1.34 M/S
MV PEAK E VEL: 113 CM/S
MV STENOSIS PRESSURE HALF TIME: 34 MS
MV VALVE AREA P 1/2 METHOD: 6.47
NASAL CANNULA: 5
NEUTROPHILS # BLD AUTO: 22.11 THOUSANDS/ÂΜL (ref 1.85–7.62)
NEUTS SEG NFR BLD AUTO: 86 % (ref 43–75)
NONHDLC SERPL-MCNC: 168 MG/DL
NRBC BLD AUTO-RTO: 0 /100 WBCS
O2 CT BLDA-SCNC: 17.6 ML/DL (ref 16–23)
OXYHGB MFR BLDA: 92.6 % (ref 94–97)
P AXIS: -10 DEGREES
P AXIS: 32 DEGREES
PCO2 BLDA: 42.2 MM HG (ref 36–44)
PH BLDA: 7.32 [PH] (ref 7.35–7.45)
PHOSPHATE SERPL-MCNC: 3.5 MG/DL (ref 2.3–4.1)
PHOSPHATE SERPL-MCNC: 4.3 MG/DL (ref 2.3–4.1)
PLATELET # BLD AUTO: 121 THOUSANDS/UL (ref 149–390)
PLATELET # BLD AUTO: 133 THOUSANDS/UL (ref 149–390)
PMV BLD AUTO: 11.7 FL (ref 8.9–12.7)
PMV BLD AUTO: 12.1 FL (ref 8.9–12.7)
PO2 BLDA: 74.8 MM HG (ref 75–129)
POTASSIUM SERPL-SCNC: 3.9 MMOL/L (ref 3.5–5.3)
POTASSIUM SERPL-SCNC: 4.6 MMOL/L (ref 3.5–5.3)
PR INTERVAL: 160 MS
PR INTERVAL: 184 MS
PROT SERPL-MCNC: 6.4 G/DL (ref 6.4–8.4)
PROTHROMBIN TIME: 18.2 SECONDS (ref 11.6–14.5)
QRS AXIS: -32 DEGREES
QRS AXIS: -75 DEGREES
QRSD INTERVAL: 136 MS
QRSD INTERVAL: 144 MS
QT INTERVAL: 354 MS
QT INTERVAL: 404 MS
QTC INTERVAL: 492 MS
QTC INTERVAL: 529 MS
RBC # BLD AUTO: 3.9 MILLION/UL (ref 3.81–5.12)
RBC # BLD AUTO: 3.94 MILLION/UL (ref 3.81–5.12)
RIGHT ATRIUM AREA SYSTOLE A4C: 12.7 CM2
RIGHT VENTRICLE ID DIMENSION: 2.9 CM
SL CV LEFT ATRIUM LENGTH A2C: 5.5 CM
SL CV LV EF: 65
SL CV PED ECHO LEFT VENTRICLE DIASTOLIC VOLUME (MOD BIPLANE) 2D: 64 ML
SL CV PED ECHO LEFT VENTRICLE SYSTOLIC VOLUME (MOD BIPLANE) 2D: 27 ML
SODIUM SERPL-SCNC: 134 MMOL/L (ref 135–147)
SODIUM SERPL-SCNC: 134 MMOL/L (ref 135–147)
SPECIMEN SOURCE: ABNORMAL
T WAVE AXIS: 66 DEGREES
T WAVE AXIS: 66 DEGREES
TRICUSPID ANNULAR PLANE SYSTOLIC EXCURSION: 2 CM
TRIGL SERPL-MCNC: 223 MG/DL
VENTRICULAR RATE: 103 BPM
VENTRICULAR RATE: 116 BPM
WBC # BLD AUTO: 25.47 THOUSAND/UL (ref 4.31–10.16)
WBC # BLD AUTO: 30.32 THOUSAND/UL (ref 4.31–10.16)

## 2024-01-29 PROCEDURE — 83036 HEMOGLOBIN GLYCOSYLATED A1C: CPT

## 2024-01-29 PROCEDURE — 99223 1ST HOSP IP/OBS HIGH 75: CPT | Performed by: INTERNAL MEDICINE

## 2024-01-29 PROCEDURE — 85610 PROTHROMBIN TIME: CPT

## 2024-01-29 PROCEDURE — 84100 ASSAY OF PHOSPHORUS: CPT

## 2024-01-29 PROCEDURE — 80048 BASIC METABOLIC PNL TOTAL CA: CPT

## 2024-01-29 PROCEDURE — 94640 AIRWAY INHALATION TREATMENT: CPT

## 2024-01-29 PROCEDURE — 83735 ASSAY OF MAGNESIUM: CPT

## 2024-01-29 PROCEDURE — 94760 N-INVAS EAR/PLS OXIMETRY 1: CPT

## 2024-01-29 PROCEDURE — 83735 ASSAY OF MAGNESIUM: CPT | Performed by: UROLOGY

## 2024-01-29 PROCEDURE — 99223 1ST HOSP IP/OBS HIGH 75: CPT | Performed by: UROLOGY

## 2024-01-29 PROCEDURE — 93010 ELECTROCARDIOGRAM REPORT: CPT | Performed by: INTERNAL MEDICINE

## 2024-01-29 PROCEDURE — 97163 PT EVAL HIGH COMPLEX 45 MIN: CPT

## 2024-01-29 PROCEDURE — BT1D1ZZ FLUOROSCOPY OF RIGHT KIDNEY, URETER AND BLADDER USING LOW OSMOLAR CONTRAST: ICD-10-PCS | Performed by: UROLOGY

## 2024-01-29 PROCEDURE — 0T768DZ DILATION OF RIGHT URETER WITH INTRALUMINAL DEVICE, VIA NATURAL OR ARTIFICIAL OPENING ENDOSCOPIC: ICD-10-PCS | Performed by: UROLOGY

## 2024-01-29 PROCEDURE — 93306 TTE W/DOPPLER COMPLETE: CPT | Performed by: INTERNAL MEDICINE

## 2024-01-29 PROCEDURE — 87086 URINE CULTURE/COLONY COUNT: CPT | Performed by: UROLOGY

## 2024-01-29 PROCEDURE — 94664 DEMO&/EVAL PT USE INHALER: CPT

## 2024-01-29 PROCEDURE — 87077 CULTURE AEROBIC IDENTIFY: CPT | Performed by: UROLOGY

## 2024-01-29 PROCEDURE — 82330 ASSAY OF CALCIUM: CPT

## 2024-01-29 PROCEDURE — 36415 COLL VENOUS BLD VENIPUNCTURE: CPT

## 2024-01-29 PROCEDURE — 83880 ASSAY OF NATRIURETIC PEPTIDE: CPT

## 2024-01-29 PROCEDURE — 87081 CULTURE SCREEN ONLY: CPT

## 2024-01-29 PROCEDURE — 93306 TTE W/DOPPLER COMPLETE: CPT

## 2024-01-29 PROCEDURE — 83605 ASSAY OF LACTIC ACID: CPT

## 2024-01-29 PROCEDURE — 99291 CRITICAL CARE FIRST HOUR: CPT

## 2024-01-29 PROCEDURE — 80061 LIPID PANEL: CPT

## 2024-01-29 PROCEDURE — C1769 GUIDE WIRE: HCPCS | Performed by: UROLOGY

## 2024-01-29 PROCEDURE — 85730 THROMBOPLASTIN TIME PARTIAL: CPT

## 2024-01-29 PROCEDURE — 97167 OT EVAL HIGH COMPLEX 60 MIN: CPT

## 2024-01-29 PROCEDURE — 84100 ASSAY OF PHOSPHORUS: CPT | Performed by: UROLOGY

## 2024-01-29 PROCEDURE — 85027 COMPLETE CBC AUTOMATED: CPT

## 2024-01-29 PROCEDURE — 84484 ASSAY OF TROPONIN QUANT: CPT

## 2024-01-29 PROCEDURE — 82805 BLOOD GASES W/O2 SATURATION: CPT

## 2024-01-29 PROCEDURE — 74420 UROGRAPHY RTRGR +-KUB: CPT

## 2024-01-29 PROCEDURE — C2617 STENT, NON-COR, TEM W/O DEL: HCPCS | Performed by: UROLOGY

## 2024-01-29 PROCEDURE — C1758 CATHETER, URETERAL: HCPCS | Performed by: UROLOGY

## 2024-01-29 PROCEDURE — 52332 CYSTOSCOPY AND TREATMENT: CPT | Performed by: UROLOGY

## 2024-01-29 PROCEDURE — 93005 ELECTROCARDIOGRAM TRACING: CPT

## 2024-01-29 PROCEDURE — 85730 THROMBOPLASTIN TIME PARTIAL: CPT | Performed by: INTERNAL MEDICINE

## 2024-01-29 PROCEDURE — 99255 IP/OBS CONSLTJ NEW/EST HI 80: CPT | Performed by: INTERNAL MEDICINE

## 2024-01-29 PROCEDURE — 80053 COMPREHEN METABOLIC PANEL: CPT | Performed by: UROLOGY

## 2024-01-29 PROCEDURE — 82330 ASSAY OF CALCIUM: CPT | Performed by: UROLOGY

## 2024-01-29 PROCEDURE — 87186 SC STD MICRODIL/AGAR DIL: CPT | Performed by: UROLOGY

## 2024-01-29 PROCEDURE — 85027 COMPLETE CBC AUTOMATED: CPT | Performed by: UROLOGY

## 2024-01-29 DEVICE — STENT URETERAL 6FR 22CM INLAY OPTIMA W/NITINOL GDWR: Type: IMPLANTABLE DEVICE | Site: URETER | Status: FUNCTIONAL

## 2024-01-29 RX ORDER — ASPIRIN 81 MG/1
81 TABLET, CHEWABLE ORAL DAILY
Status: DISCONTINUED | OUTPATIENT
Start: 2024-01-30 | End: 2024-01-31

## 2024-01-29 RX ORDER — ASPIRIN 81 MG/1
325 TABLET, CHEWABLE ORAL ONCE
Status: COMPLETED | OUTPATIENT
Start: 2024-01-29 | End: 2024-01-29

## 2024-01-29 RX ORDER — KETAMINE HCL IN NACL, ISO-OSM 100MG/10ML
SYRINGE (ML) INJECTION AS NEEDED
Status: DISCONTINUED | OUTPATIENT
Start: 2024-01-29 | End: 2024-01-29

## 2024-01-29 RX ORDER — SODIUM CHLORIDE, SODIUM LACTATE, POTASSIUM CHLORIDE, CALCIUM CHLORIDE 600; 310; 30; 20 MG/100ML; MG/100ML; MG/100ML; MG/100ML
INJECTION, SOLUTION INTRAVENOUS CONTINUOUS PRN
Status: DISCONTINUED | OUTPATIENT
Start: 2024-01-29 | End: 2024-01-29

## 2024-01-29 RX ORDER — HEPARIN SODIUM 5000 [USP'U]/ML
5000 INJECTION, SOLUTION INTRAVENOUS; SUBCUTANEOUS EVERY 8 HOURS SCHEDULED
Status: DISCONTINUED | OUTPATIENT
Start: 2024-01-29 | End: 2024-01-29

## 2024-01-29 RX ORDER — CIPROFLOXACIN 2 MG/ML
400 INJECTION, SOLUTION INTRAVENOUS ONCE
Status: DISCONTINUED | OUTPATIENT
Start: 2024-01-29 | End: 2024-01-29 | Stop reason: HOSPADM

## 2024-01-29 RX ORDER — MAGNESIUM HYDROXIDE 1200 MG/15ML
LIQUID ORAL AS NEEDED
Status: DISCONTINUED | OUTPATIENT
Start: 2024-01-29 | End: 2024-01-29 | Stop reason: HOSPADM

## 2024-01-29 RX ORDER — HEPARIN SODIUM 10000 [USP'U]/100ML
3-20 INJECTION, SOLUTION INTRAVENOUS
Status: DISCONTINUED | OUTPATIENT
Start: 2024-01-29 | End: 2024-01-31

## 2024-01-29 RX ORDER — CLOPIDOGREL BISULFATE 75 MG/1
75 TABLET ORAL DAILY
Status: DISCONTINUED | OUTPATIENT
Start: 2024-01-29 | End: 2024-01-31

## 2024-01-29 RX ORDER — DEXAMETHASONE SODIUM PHOSPHATE 10 MG/ML
INJECTION, SOLUTION INTRAMUSCULAR; INTRAVENOUS AS NEEDED
Status: DISCONTINUED | OUTPATIENT
Start: 2024-01-29 | End: 2024-01-29

## 2024-01-29 RX ORDER — LEVOFLOXACIN 5 MG/ML
750 INJECTION, SOLUTION INTRAVENOUS ONCE
OUTPATIENT
Start: 2024-01-29 | End: 2024-01-29

## 2024-01-29 RX ORDER — ALBUTEROL SULFATE 2.5 MG/3ML
2.5 SOLUTION RESPIRATORY (INHALATION) ONCE
Status: COMPLETED | OUTPATIENT
Start: 2024-01-29 | End: 2024-01-29

## 2024-01-29 RX ORDER — LANOLIN ALCOHOL/MO/W.PET/CERES
3 CREAM (GRAM) TOPICAL
Status: DISCONTINUED | OUTPATIENT
Start: 2024-01-29 | End: 2024-01-30

## 2024-01-29 RX ORDER — LIDOCAINE HYDROCHLORIDE 10 MG/ML
INJECTION, SOLUTION EPIDURAL; INFILTRATION; INTRACAUDAL; PERINEURAL AS NEEDED
Status: DISCONTINUED | OUTPATIENT
Start: 2024-01-29 | End: 2024-01-29

## 2024-01-29 RX ORDER — CALCIUM GLUCONATE 20 MG/ML
2 INJECTION, SOLUTION INTRAVENOUS ONCE
Status: COMPLETED | OUTPATIENT
Start: 2024-01-29 | End: 2024-01-29

## 2024-01-29 RX ORDER — ACETAMINOPHEN 325 MG/1
650 TABLET ORAL EVERY 6 HOURS PRN
Status: DISCONTINUED | OUTPATIENT
Start: 2024-01-29 | End: 2024-01-29

## 2024-01-29 RX ORDER — HEPARIN SODIUM 5000 [USP'U]/ML
7500 INJECTION, SOLUTION INTRAVENOUS; SUBCUTANEOUS EVERY 8 HOURS SCHEDULED
Status: DISCONTINUED | OUTPATIENT
Start: 2024-01-29 | End: 2024-01-29

## 2024-01-29 RX ORDER — LEVALBUTEROL INHALATION SOLUTION 1.25 MG/3ML
1.25 SOLUTION RESPIRATORY (INHALATION) EVERY 6 HOURS PRN
Status: DISCONTINUED | OUTPATIENT
Start: 2024-01-29 | End: 2024-01-30

## 2024-01-29 RX ORDER — ONDANSETRON 2 MG/ML
INJECTION INTRAMUSCULAR; INTRAVENOUS AS NEEDED
Status: DISCONTINUED | OUTPATIENT
Start: 2024-01-29 | End: 2024-01-29

## 2024-01-29 RX ORDER — ONDANSETRON 2 MG/ML
4 INJECTION INTRAMUSCULAR; INTRAVENOUS EVERY 6 HOURS PRN
Status: DISCONTINUED | OUTPATIENT
Start: 2024-01-29 | End: 2024-01-29

## 2024-01-29 RX ORDER — ATORVASTATIN CALCIUM 40 MG/1
80 TABLET, FILM COATED ORAL EVERY EVENING
Status: DISCONTINUED | OUTPATIENT
Start: 2024-01-29 | End: 2024-02-08 | Stop reason: HOSPADM

## 2024-01-29 RX ORDER — VANCOMYCIN HYDROCHLORIDE 1 G/200ML
15 INJECTION, SOLUTION INTRAVENOUS DAILY PRN
Status: DISCONTINUED | OUTPATIENT
Start: 2024-01-29 | End: 2024-01-31

## 2024-01-29 RX ORDER — HEPARIN SODIUM 1000 [USP'U]/ML
4000 INJECTION, SOLUTION INTRAVENOUS; SUBCUTANEOUS ONCE
Status: COMPLETED | OUTPATIENT
Start: 2024-01-29 | End: 2024-01-29

## 2024-01-29 RX ORDER — PROPOFOL 10 MG/ML
INJECTION, EMULSION INTRAVENOUS AS NEEDED
Status: DISCONTINUED | OUTPATIENT
Start: 2024-01-29 | End: 2024-01-29

## 2024-01-29 RX ORDER — ACETAMINOPHEN 325 MG/1
975 TABLET ORAL EVERY 6 HOURS PRN
Status: DISCONTINUED | OUTPATIENT
Start: 2024-01-29 | End: 2024-01-31

## 2024-01-29 RX ORDER — HEPARIN SODIUM 1000 [USP'U]/ML
4000 INJECTION, SOLUTION INTRAVENOUS; SUBCUTANEOUS EVERY 6 HOURS PRN
Status: DISCONTINUED | OUTPATIENT
Start: 2024-01-29 | End: 2024-01-31

## 2024-01-29 RX ORDER — MAGNESIUM SULFATE HEPTAHYDRATE 40 MG/ML
2 INJECTION, SOLUTION INTRAVENOUS ONCE
Status: COMPLETED | OUTPATIENT
Start: 2024-01-29 | End: 2024-01-29

## 2024-01-29 RX ORDER — CHLORHEXIDINE GLUCONATE ORAL RINSE 1.2 MG/ML
15 SOLUTION DENTAL EVERY 12 HOURS SCHEDULED
Status: DISCONTINUED | OUTPATIENT
Start: 2024-01-29 | End: 2024-02-08 | Stop reason: HOSPADM

## 2024-01-29 RX ORDER — CIPROFLOXACIN 2 MG/ML
INJECTION, SOLUTION INTRAVENOUS CONTINUOUS PRN
Status: DISCONTINUED | OUTPATIENT
Start: 2024-01-29 | End: 2024-01-29

## 2024-01-29 RX ORDER — ONDANSETRON 2 MG/ML
4 INJECTION INTRAMUSCULAR; INTRAVENOUS ONCE
Status: COMPLETED | OUTPATIENT
Start: 2024-01-29 | End: 2024-01-29

## 2024-01-29 RX ORDER — HEPARIN SODIUM 1000 [USP'U]/ML
2000 INJECTION, SOLUTION INTRAVENOUS; SUBCUTANEOUS EVERY 6 HOURS PRN
Status: DISCONTINUED | OUTPATIENT
Start: 2024-01-29 | End: 2024-01-31

## 2024-01-29 RX ADMIN — ACETAMINOPHEN 975 MG: 325 TABLET, FILM COATED ORAL at 10:33

## 2024-01-29 RX ADMIN — ONDANSETRON 4 MG: 2 INJECTION INTRAMUSCULAR; INTRAVENOUS at 01:37

## 2024-01-29 RX ADMIN — MAGNESIUM SULFATE HEPTAHYDRATE 2 G: 40 INJECTION, SOLUTION INTRAVENOUS at 03:07

## 2024-01-29 RX ADMIN — ATORVASTATIN CALCIUM 80 MG: 40 TABLET, FILM COATED ORAL at 17:03

## 2024-01-29 RX ADMIN — LEVALBUTEROL HYDROCHLORIDE 1.25 MG: 1.25 SOLUTION RESPIRATORY (INHALATION) at 13:57

## 2024-01-29 RX ADMIN — CHLORHEXIDINE GLUCONATE 15 ML: 1.2 SOLUTION ORAL at 21:04

## 2024-01-29 RX ADMIN — LIDOCAINE HYDROCHLORIDE 10 MG: 10 INJECTION, SOLUTION EPIDURAL; INFILTRATION; INTRACAUDAL; PERINEURAL at 01:25

## 2024-01-29 RX ADMIN — HEPARIN SODIUM 5000 UNITS: 5000 INJECTION INTRAVENOUS; SUBCUTANEOUS at 05:47

## 2024-01-29 RX ADMIN — PROPOFOL 10 MG: 10 INJECTION, EMULSION INTRAVENOUS at 01:25

## 2024-01-29 RX ADMIN — CEFEPIME 1000 MG: 1 INJECTION, POWDER, FOR SOLUTION INTRAMUSCULAR; INTRAVENOUS at 10:42

## 2024-01-29 RX ADMIN — ALBUTEROL SULFATE 2.5 MG: 2.5 SOLUTION RESPIRATORY (INHALATION) at 00:11

## 2024-01-29 RX ADMIN — SODIUM CHLORIDE, SODIUM LACTATE, POTASSIUM CHLORIDE, AND CALCIUM CHLORIDE: .6; .31; .03; .02 INJECTION, SOLUTION INTRAVENOUS at 01:19

## 2024-01-29 RX ADMIN — CEFTRIAXONE SODIUM 2000 MG: 10 INJECTION, POWDER, FOR SOLUTION INTRAVENOUS at 16:18

## 2024-01-29 RX ADMIN — CALCIUM GLUCONATE 2 G: 20 INJECTION, SOLUTION INTRAVENOUS at 07:44

## 2024-01-29 RX ADMIN — DEXAMETHASONE SODIUM PHOSPHATE 10 MG: 10 INJECTION INTRAMUSCULAR; INTRAVENOUS at 01:37

## 2024-01-29 RX ADMIN — TRIMETHOBENZAMIDE HYDROCHLORIDE 200 MG: 100 INJECTION INTRAMUSCULAR at 08:08

## 2024-01-29 RX ADMIN — Medication 3 MG: at 21:04

## 2024-01-29 RX ADMIN — ONDANSETRON 4 MG: 2 INJECTION INTRAMUSCULAR; INTRAVENOUS at 06:50

## 2024-01-29 RX ADMIN — HEPARIN SODIUM 4000 UNITS: 1000 INJECTION INTRAVENOUS; SUBCUTANEOUS at 07:58

## 2024-01-29 RX ADMIN — Medication 15 MG: at 01:33

## 2024-01-29 RX ADMIN — HEPARIN SODIUM 11.1 UNITS/KG/HR: 10000 INJECTION, SOLUTION INTRAVENOUS at 07:57

## 2024-01-29 RX ADMIN — CLOPIDOGREL BISULFATE 75 MG: 75 TABLET ORAL at 08:02

## 2024-01-29 RX ADMIN — CIPROFLOXACIN: 2 INJECTION, SOLUTION INTRAVENOUS at 01:26

## 2024-01-29 RX ADMIN — ASPIRIN 81 MG CHEWABLE TABLET 325 MG: 81 TABLET CHEWABLE at 07:57

## 2024-01-29 NOTE — OP NOTE
OPERATIVE REPORT  PATIENT NAME: Loida Duarte    :  1945  MRN: [id number]  Pt Location: AN OR ROOM 03    SURGERY DATE: 2024    Surgeons and Role:     * Barrera Vargas MD - Primary    Preop Diagnosis:  Severe sepsis (HCC) [A41.9, R65.20]  UTI (urinary tract infection) [N39.0]  Nephrolithiasis [N20.0]  Right UVJ calculus    Post-Op Diagnosis Codes:     * Severe sepsis (HCC) [A41.9, R65.20]     * UTI (urinary tract infection) [N39.0]     * Nephrolithiasis [N20.0]  Right UVJ calculus    Procedure(s):  Right - CYSTOSCOPY RETROGRADE PYELOGRAM WITH INSERTION STENT URETERAL    Specimen(s):  Urine culture    Estimated Blood Loss:   Minimal    Drains:  Urethral Catheter Latex 18 Fr. (Active)   Number of days: 0     Right 22-6 [place] double-J ureteral stent without a string      Anesthesia Type:   General    Operative Indications:  Severe sepsis (HCC) [A41.9, R65.20]  UTI (urinary tract infection) [N39.0]  Nephrolithiasis [N20.0]      Operative Findings:  Distal right ureteral calculus, right-sided hydronephrosis, body appearing urine from the right ureteral orifice upon wire and stent insertion    Complications:   None    Procedure and Technique:  Loida is an 78-year-old female who presents with right flank pain.  CT scan in [facility] emergency room at [facility] shows a 6-7 mm right UVJ calculus with right-sided hydronephrosis.  She has low-grade temperature of 100.2.  She has a white blood cell count of 27,000, lactate 3.8, and acute kidney injury with creatinine 2.5.  She is tachycardic and tachypneic.  There is immediate concern for sepsis.  She has audible wheezing and is morbidly obese.  Per my discussion with anesthesia I have recommended urgent insertion of a right double-J ureteral stent.  Plan was discussed with anesthesia to include sedation.    Risk and benefits of the procedure were discussed and reviewed.  Specifically I discussed with the patient the risk of bleeding, infection, sepsis,  ureteral injury, the need for additional surgery, cardiac/anesthetic complication including DVT, PE, MI, or even death.  Informed consent was obtained the patient was brought to the operating room on January 29, 2024.    After the patient was given IV sedation, she was placed into the dorsolithotomy position.  Her genitalia is prepped and draped in sterile fashion.  Intravenous antibiotics were administered.  A timeout was performed with all members the operative team confirming the patient's identity, procedure to be performed, laterality of the case.    A 22 K[place]ili rigid cystoscope was inserted.  A large cystocele was appreciated.  Bladder was inspected.  The bladder wall was inflamed.  Urine was cloudy.  Culture was obtained.  The right ureteral orifice was able to be identified as the cystocele was reduced.  A 5 K[place]ili open-ended catheter was guided into the right ureteral orifice with the assistance of a wire.  A right retrograde pyelogram was performed within the distal ureter showing a filling defect at the UVJ consistent with the stone as well as right-sided moderate hydro nephroureterosis with tortuosity of the right proximal ureter.  I was able to straighten the ureter with a wire and then advanced a right 22-6 K[place] double-J ureteral stent over top of the wire.  The proximal coil was appreciated within the right renal pelvis and the distal coil was visualized within the bladder.  Cloudy urine came from the ureteral orifice from in and around the stent.  An 18 Kisw[place]ili King catheter was then inserted and connected to gravity drainage.    Overall the patient tolerated the procedure well and there were no surgical complications from the procedure.  The patient also tolerated the procedure from a hemodynamic standpoint with her IV sedation.    The patient was transferred directly to the ICU at the conclusion of the case as the ICU team is aware of her arrival.    Patient Disposition:  Critical Care  Unit        SIGNATURE: Barrera Vargas MD  DATE: January 29, 2024  TIME: 1:46 AM

## 2024-01-29 NOTE — ED PROVIDER NOTES
"History  Chief Complaint   Patient presents with    Wheezing     Pt arrived via EMS from home for N/V, weakness. On arrival pt is wheezing, increased work of breathing. Pt states \" I haven't been to a doctor in 50 years\".     Vomiting     HPI    77 yo female with unknown pmh presents via EMS for evaluation of nausea, vomiting, weakness.  Symptoms started yesterday with nausea, vomiting, diarrhea, and abdominal pain.  Reports multiple episodes of  \"black-colored\" emesis, and weakness. Has been unable to tolerate PO. She has not received medical care in 50 years, and she takes no medications. Her 's caretaker reports subjective fever, and weakness, and called EMS. On arrival she was hypoxic to 88%, tachypneic with increased work of breathing. Sats improved with 2L O2 NC.     None       History reviewed. No pertinent past medical history.    History reviewed. No pertinent surgical history.    History reviewed. No pertinent family history.  I have reviewed and agree with the history as documented.    E-Cigarette/Vaping     E-Cigarette/Vaping Substances     Social History     Tobacco Use    Smoking status: Former     Types: Cigarettes    Smokeless tobacco: Never   Substance Use Topics    Alcohol use: Never    Drug use: Never        Review of Systems   Constitutional:  Positive for fatigue and fever. Negative for diaphoresis.   HENT:  Negative for congestion and rhinorrhea.    Eyes:  Negative for visual disturbance.   Respiratory:  Negative for shortness of breath.    Cardiovascular:  Negative for chest pain.   Gastrointestinal:  Positive for abdominal pain, diarrhea, nausea and vomiting. Negative for blood in stool.   Genitourinary:  Negative for dysuria, frequency and urgency.   Musculoskeletal:  Positive for arthralgias.   Skin:  Negative for color change and pallor.   Neurological:  Positive for weakness. Negative for dizziness, light-headedness and headaches.       Physical Exam  ED Triage Vitals [01/28/24 " 2123]   Temperature Pulse Respirations Blood Pressure SpO2   (!) 100.7 °F (38.2 °C) (!) 118 (!) 26 132/65 (!) 88 %      Temp Source Heart Rate Source Patient Position - Orthostatic VS BP Location FiO2 (%)   Oral Monitor Sitting Right arm --      Pain Score       --             Orthostatic Vital Signs  Vitals:    01/28/24 2200 01/28/24 2227 01/28/24 2330 01/28/24 2345   BP: 99/56 115/58 118/73 97/55   Pulse: (!) 110 102 103 102   Patient Position - Orthostatic VS:           Physical Exam  Vitals and nursing note reviewed.   Constitutional:       General: She is in acute distress.      Appearance: She is well-developed. She is obese.   HENT:      Head: Normocephalic and atraumatic.      Mouth/Throat:      Mouth: Mucous membranes are dry.   Eyes:      Conjunctiva/sclera: Conjunctivae normal.      Pupils: Pupils are equal, round, and reactive to light.   Cardiovascular:      Rate and Rhythm: Regular rhythm. Tachycardia present.      Heart sounds: Normal heart sounds. No murmur heard.  Pulmonary:      Effort: Respiratory distress present.      Breath sounds: Normal breath sounds.   Abdominal:      Palpations: Abdomen is soft.      Tenderness: There is no abdominal tenderness.   Musculoskeletal:         General: No swelling.      Cervical back: Neck supple.   Skin:     General: Skin is warm and dry.      Capillary Refill: Capillary refill takes less than 2 seconds.   Neurological:      Mental Status: She is alert and oriented to person, place, and time.   Psychiatric:         Mood and Affect: Mood normal.         ED Medications  Medications   vancomycin (VANCOCIN) 1,750 mg in sodium chloride 0.9 % 500 mL IVPB (1,750 mg Intravenous New Bag 1/28/24 2352)   sodium chloride 0.9 % bolus 500 mL (500 mL Intravenous New Bag 1/28/24 2319)   acetaminophen (Ofirmev) injection 1,000 mg (0 mg Intravenous Stopped 1/28/24 2203)   cefepime (MAXIPIME) 2 g/50 mL dextrose IVPB (0 mg Intravenous Stopped 1/28/24 2318)   albuterol inhalation  solution 2.5 mg (2.5 mg Nebulization Given 1/29/24 0011)       Diagnostic Studies  Results Reviewed       Procedure Component Value Units Date/Time    Lactic acid 2 Hours [[id number]] Collected: 01/29/24 0026    Lab Status: No result Specimen: Blood from Arm, Right     Urine Microscopic [[id number]]  (Abnormal) Collected: 01/28/24 2230    Lab Status: Final result Specimen: Urine, Straight Cath Updated: 01/28/24 2305     RBC, UA 10-20 /hpf      WBC, UA Innumerable /hpf      Epithelial Cells Occasional /hpf      Bacteria, UA Occasional /hpf      MUCUS THREADS Occasional     WBC Clumps Present     Budding Yeast Present    Urine culture [[id number]] Collected: 01/28/24 2230    Lab Status: In process Specimen: Urine, Straight Cath Updated: 01/28/24 2305    RBC Morphology Reflex Test [[id number]] Collected: 01/28/24 2126    Lab Status: Final result Specimen: Blood from Arm, Left Updated: 01/28/24 2301    UA w Reflex to Microscopic w Reflex to Culture [[id number]]  (Abnormal) Collected: 01/28/24 2230    Lab Status: Final result Specimen: Urine, Straight Cath Updated: 01/28/24 2250     Color, UA Yellow     Clarity, UA Extra Turbid     Specific Gravity, UA 1.022     pH, UA 5.5     Leukocytes, UA Large     Nitrite, UA Negative     Protein,  (2+) mg/dl      Glucose, UA Negative mg/dl      Ketones, UA 10 (1+) mg/dl      Urobilinogen, UA <2.0 mg/dl      Bilirubin, UA Negative     Occult Blood, UA Moderate    Procalcitonin [[id number]]  (Abnormal) Collected: 01/28/24 2126    Lab Status: Final result Specimen: Blood from Arm, Left Updated: 01/28/24 2233     Procalcitonin 230.37 ng/ml     CBC and differential [[id number]]  (Abnormal) Collected: 01/28/24 2126    Lab Status: Final result Specimen: Blood from Arm, Left Updated: 01/28/24 2221     WBC 27.34 Thousand/uL      RBC 4.32 Million/uL      Hemoglobin 13.7 g/dL      Hematocrit 42.3 %      MCV 98 fL      MCH 31.7 pg      MCHC 32.4 g/dL      RDW 13.9 %      MPV 11.4 fL       Platelets 148 Thousands/uL     Narrative:      This is an appended report.  These results have been appended to a previously verified report.    Manual Differential(PHLEBS Do Not Order) [[id number]]  (Abnormal) Collected: 01/28/24 2126    Lab Status: Final result Specimen: Blood from Arm, Left Updated: 01/28/24 2221     Segmented % 67 %      Bands % 23 %      Lymphocytes % 2 %      Monocytes % 3 %      Eosinophils, % 0 %      Basophils % 0 %      Metamyelocytes% 3 %      Myelocytes % 1 %      Atypical Lymphocytes % 1 %      Absolute Neutrophils 24.61 Thousand/uL      Lymphocytes Absolute 0.82 Thousand/uL      Monocytes Absolute 0.82 Thousand/uL      Eosinophils Absolute 0.00 Thousand/uL      Basophils Absolute 0.00 Thousand/uL      Total Counted --     RBC Morphology Present     Platelet Estimate Adequate     Stomatocytes Present    FLU/RSV/COVID - if FLU/RSV clinically relevant [[id number]]  (Normal) Collected: 01/28/24 2128    Lab Status: Final result Specimen: Nares from Nose Updated: 01/28/24 2216     SARS-CoV-2 Negative     INFLUENZA A PCR Negative     INFLUENZA B PCR Negative     RSV PCR Negative    Narrative:      FOR PEDIATRIC PATIENTS - copy/paste COVID Guidelines URL to browser: [email or web address]    SARS-CoV-2 assay is a Nucleic Acid Amplification assay intended for the  qualitative detection of nucleic acid from SARS-CoV-2 in nasopharyngeal  swabs. Results are for the presumptive identification of SARS-CoV-2 RNA.    Positive results are indicative of infection with SARS-CoV-2, the virus  causing COVID-19, but do not rule out bacterial infection or co-infection  with other viruses. Laboratories within the [place] and its  territories are required to report all positive results to the appropriate  public health authorities. Negative results do not preclude SARS-CoV-2  infection and should not be used as the sole basis for treatment or  other  patient management decisions. Negative results must be combined with  clinical observations, patient history, and epidemiological information.  This test has not been FDA cleared or approved.    This test has been authorized by FDA under an Emergency Use Authorization  (EUA). This test is only authorized for the duration of time the  declaration that circumstances exist justifying the authorization of the  emergency use of an in vitro diagnostic tests for detection of SARS-CoV-2  virus and/or diagnosis of COVID-19 infection under section 564(b)(1) of  the Act, 21 U.S.C. 360bbb-3(b)(1), unless the authorization is terminated  or revoked sooner. The test has been validated but independent review by FDA  and CLIA is pending.    Test performed using [employer]: This RT-PCR assay targets N2,  a region unique to SARS-CoV-2. A conserved region in the E-gene was chosen  for pan-Sarbecovirus detection which includes SARS-CoV-2.    According to CMS-2020-01-R, this platform meets the definition of high-throughput technology.    Lactic acid, plasma (w/reflex if result > 2.0) [[id number]]  (Abnormal) Collected: 01/28/24 2126    Lab Status: Final result Specimen: Blood from Arm, Left Updated: 01/28/24 2214     LACTIC ACID 3.8 mmol/L     Narrative:      Result may be elevated if tourniquet was used during collection.    APTT [[id number]]  (Normal) Collected: 01/28/24 2126    Lab Status: Final result Specimen: Blood from Arm, Left Updated: 01/28/24 2213     PTT 36 seconds     Protime-INR [[id number]]  (Abnormal) Collected: 01/28/24 2126    Lab Status: Final result Specimen: Blood from Arm, Left Updated: 01/28/24 2213     Protime 16.1 seconds      INR 1.22    Comprehensive metabolic panel [[id number]]  (Abnormal) Collected: 01/28/24 2126    Lab Status: Final result Specimen: Blood from Arm, Left Updated: 01/28/24 2150     Sodium 135 mmol/L      Potassium 3.7 mmol/L      Chloride 99 mmol/L      CO2 23 mmol/L      ANION GAP  13 mmol/L      BUN 34 mg/dL      Creatinine 2.58 mg/dL      Glucose 84 mg/dL      Calcium 8.8 mg/dL      AST 46 U/L      ALT 11 U/L      Alkaline Phosphatase 69 U/L      Total Protein 7.0 g/dL      Albumin 3.6 g/dL      Total Bilirubin 0.71 mg/dL      eGFR 16 ml/min/1.73sq m     Narrative:      National Kidney Disease Foundation guidelines for Chronic Kidney Disease (CKD):     Stage 1 with normal or high GFR (GFR > 90 mL/min/1.73 square meters)    Stage 2 Mild CKD (GFR = 60-89 mL/min/1.73 square meters)    Stage 3A Moderate CKD (GFR = 45-59 mL/min/1.73 square meters)    Stage 3B Moderate CKD (GFR = 30-44 mL/min/1.73 square meters)    Stage 4 Severe CKD (GFR = 15-29 mL/min/1.73 square meters)    Stage 5 End Stage CKD (GFR <15 mL/min/1.73 square meters)  Note: GFR calculation is accurate only with a steady state creatinine    Blood gas, venous [[id number]]  (Abnormal) Collected: 01/28/24 2128    Lab Status: Final result Specimen: Blood from Arm, Left Updated: 01/28/24 2142     pH, Fausto 7.358     pCO2, Fausto 41.4 mm Hg      pO2, Fausto 33.5 mm Hg      HCO3, Fausto 22.8 mmol/L      Base Excess, Fausto -2.6 mmol/L      O2 Content, Fausto 12.9 ml/dL      O2 HGB, VENOUS 62.4 %     Blood culture #1 [[id number]] Collected: 01/28/24 2126    Lab Status: In process Specimen: Blood from Arm, Left Updated: 01/28/24 2133    Blood culture #2 [[id number]] Collected: 01/28/24 2131    Lab Status: In process Specimen: Blood from Hand, Left Updated: 01/28/24 2133                   CT chest abdomen pelvis wo contrast   ED Interpretation by Fred Espinoza MD (01/29 0010)   Read on [facility].    CT Chest  IMPRESSION:   1. No acute findings in the chest.   2. For details regarding the abdominal and pelvic findings, refer to the CT abdomen and pelvis report below      CT Abd/Pel  IMPRESSION:   1. 6 mm calculus in the right ureterovesical junction and moderate right hydroureteronephrosis.    2. Cholelithiasis without CT evidence for cholecystitis.   3.  Colonic diverticulosis without evidence for diverticulitis.   4. For details regarding the chest findings, refer to the CT chest report above.         XR chest 1 view portable   ED Interpretation by Fausto Strickland DO (01/28 2321)   Cardiomegaly, no other acute dz            Procedures  ECG 12 Lead Documentation Only    Date/Time: 1/28/2024 9:40 PM    Performed by: Fred Espinoza MD  Authorized by: Fred Espinoza MD    Rate:     ECG rate:  116    ECG rate assessment: tachycardic    Rhythm:     Rhythm: sinus tachycardia    QRS:     QRS axis:  Left  Conduction:     Conduction: abnormal      Abnormal conduction: complete RBBB, LAFB and bifascicular block    Comments:      No ischemic changes or ST elevations noted. Normal GA and QT intervals.         ED Course  ED Course as of 01/29/24 0029   Sun Jan 28, 2024 2137 On 2L O2   2151 Creatinine(!): 2.58  Cannot evaluate for RAKEL given pt's lack of medical care     2151 WBC(!): 27.34   2216 LACTIC ACID(!!): 3.8  Severe sepsis noted, will hold off on fluids, due to inability to fully assess volume status      2218 Cefepime and vancomycin ordered. Source still unknown at this time   2229 Bedside cardiac/lung US showed no pericardial effusion, right heart strapleural effusions   2238 Procalcitonin(!): 230.37   2306 Urine Microscopic(!)  Innumerable WBC, occasional bacteria. Consistent with UTI. Possible source of infection.                          Initial Sepsis Screening       Row Name 01/28/24 2135                Is the patient's history suggestive of a new or worsening infection? Yes (Proceed)  -AA        Suspected source of infection suspect infection, source unknown  -AA        Indicate SIRS criteria Tachycardia > 90 bpm;Tachypnea > 20 resp per min  -AA        Are two or more of the above signs & symptoms of infection both present and new to the patient? Yes (Proceed)  -AA        Assess for evidence of organ dysfunction: Are any of the below criteria present  within 6 hours of suspected infection and SIRS criteria that are NOT considered to be chronic conditions? --                  User Key  (r) = Recorded By, (t) = Taken By, (c) = Cosigned By      Initials Name Provider Type    CECILIA Espinoza MD Resident                              Medical Decision Making  78-year-old female presents for evaluation of nausea, vomiting, diarrhea.    Differentials include but not limited to sepsis, UTI, pneumonia, intra-abdominal infection, PE. ACS unlikely given absence of chest pain, presence of fever and no EKG changes.    Sepsis labs ordered, CXR, CT chest, abdomen/pelvis.  Tylenol given for fever reduction.  Given fever, tachypnea, tachycardia patient meets SIRS criteria.  In presence of lactate 3.8, patient meets severe sepsis criteria.'s sepsis fluids not given at this time due to unknown volume status of patient, lack of medical history, and increased risks versus benefits of given sepsis fluids at this time. Vancomycin and Cefepime initiated. O2 sats 95-96% on 5L NC.    Labs significant for WBC 23.34, bands 23% Procal 230.37, creat 2.58. UA findings significant for UTI, making urosepsis likely diagnosis. CT chest, abd, pelvis significant for 6mm stone in R UVJ, and moderate hydroureteronephrosis. Urology consulted, and pt admitted under critical care with plan to place ureteral stent tonight. 500ml IVF given.         Amount and/or Complexity of Data Reviewed  Independent Historian: caregiver  Labs: ordered. Decision-making details documented in ED Course.  Radiology: ordered and independent interpretation performed.  ECG/medicine tests: ordered and independent interpretation performed.    Risk  Prescription drug management.  Decision regarding hospitalization.          Disposition  Final diagnoses:   Severe sepsis (HCC)   UTI (urinary tract infection)   Bandemia   Tachypnea   Hypoxia   Nephrolithiasis     Time reflects when diagnosis was documented in both MDM as  applicable and the Disposition within this note       Time User Action Codes Description Comment    1/28/2024 11:10 PM Fausto Strickland [A41.9,  R65.20] Severe sepsis (HCC)     1/28/2024 11:11 PM Fausto Strickland [N39.0] UTI (urinary tract infection)     1/28/2024 11:11 PM Fausto Strickland [D72.825] Bandemia     1/28/2024 11:11 PM Fausto Strickland [R06.82] Tachypnea     1/28/2024 11:12 PM Fausto Strickland [R09.02] Hypoxia     1/29/2024 12:02 AM Fausto Strickland [N20.0] Nephrolithiasis           ED Disposition       ED Disposition   Admit    Condition   Stable    Date/Time   Mon Jan 29, 2024 12:14 AM    Comment   Case was discussed with critical care NP and the patient's admission status was agreed to be Admission Status: inpatient status to the service of Dr. Meade .               Follow-up Information    None         Patient's Medications    No medications on file     No discharge procedures on file.    PDMP Review       None             ED Provider  Attending physically available and evaluated Loida Duarte. I managed the patient along with the ED Attending.    Electronically Signed by           Fred Espinoza MD  01/29/24 0029

## 2024-01-29 NOTE — CONSULTS
Consultation - Infectious Disease   Loida Duarte 78 y.o. female MRN: [id number]  Unit/Bed#: ICU 01 Encounter: [id number]    IMPRESSION & RECOMMENDATIONS:   Sepsis. POA. E/b fever to 100.7F, tachycardia, leukocytosis to 27, and lactic acidosis on admission. Most likely I/s/o #2, #3, and #4. CT C/A/P on admission showed a 6mm right distal ureteral calculus with right-sided hydronephrosis with perinephric and periureteral stranding. CT chest did not show any evidence of consolidation. Moderate cardiomegaly noted. COVID/Flu/RSV negative. UA obtained with innumerable WBC, large LE, and moderate blood c/f urinary source.   Antibiotics as below  Obtain surveillance blood cultures  Follow-up urine culture  Serial examinations  Monitor temperature/vitals while on IV antibiotics  O2 management per critical care team  Other supportive care per critical care team  Monitoring for potential antimicrobial toxicity by following CBCD and CMP    Polymicrobial bacteremia. Blood cultures on admission positive both sets (2/2) for GNRs and GPCs in pairs identified as E.coli and E.faecalis. Most likely etiology #3 and #4. TTE obtained showed moderate thickening of the mitral valve. Favor LUC to further assess for endocarditis. Given penicillin allergy, as in #8, will start IV vancomycin for coverage of E.faecalis and IV CTX for coverage of E.coli for now.   Stop cefepime.  Start IV CTX 2g daily and IV vancomycin (dosing per pharmacy)  Obtain surveillance blood cultures (2 sets) to assess for culture clearance  Obtain a LUC in setting of mitral valve thickening seen on TTE to assess for endocarditis  Follow-up sensitivities of E.coli and E.faecalis  Follow-up urine culture   Will narrow antibiotic selection as able based on culture data  Monitoring for potential antimicrobial toxicity by following CBCD and CMP    UTI complicated by pyelonephritis. Likely I/s/o #4. Likely etiology of #2. UA obtained in ED with innumerable WBC, large  leukocytes, and moderate blood. CT A/P on admission showed #4 and perinephric and periureteral stranding. Ucx pending.   Antibiotics as above  Follow-up urine culture   Will adjust antibiotics as able based on culture data  Monitoring for potential antimicrobial toxicity by following CBCD and CMP    Right distal ureteral calculus c/b right-sided hydronephrosis. Likely etiology of #3. CT A/P on admission showed a 6mm right UVJ calculus. Now s/p OR on 1/29 for right ureteral stent placement with Urology.   Close urology follow-up.    Elevated troponin. Cardiology consulted. Suspected nonischemic myocardial injury in setting of sepsis. Started on IV Heparin, ASA, statin, and Plavix.   Management per cardiology    RAKEL. Scr elevated to 2.8 on admission. Unknown baseline as patient has not seen a doctor in ~50 years. Consider pre-renal I/s/o sepsis vs obstructive I/s/o renal calculus.   Dose adjust antibiotics as needed  Continue to trend Scr  Diuresis per primary team    Acute hypoxic respiratory failure. POA. Noted to be 88% on room air. Also with diffuse wheezing on exam. CT chest demonstrated no acute pulmonary abnormalities. Showed cardiomegaly. Currently requiring 5L NC. Consider pulmonary edema.   Continue to monitor respiratory status  O2 management per primary team   Low concern for pneumonia at this time    Penicillin allergy. Allergy listed as swelling. States she had a rash as a child, but has not had it since. Has not seen a doctor in 50 years. Unclear if true penicillin allergy still.   Will clarify further with patient.  Consider amoxicillin challenge if necessary    Morbid obesity. BMI noted to be 51 on admission. May affect antibiotic dosing.     Above plan was discussed in detail with patient at bedside.   Above plan was discussed in detail with primary service AP, Taylor Ashley, who agrees with the plan to stop cefepime, and start IV vancomycin and CTX as above, as well as obtain repeat blood cultures  and LUC.    HISTORY OF PRESENT ILLNESS:  Reason for Consult: 1. Severe sepsis 2. UTI 3. Bacteremia  HPI: Loida Duarte is a 78 y.o. year old female with no reported past medical history as she hasn't been to a doctor in 50 years who presented via EMS from home with N/V, diarrhea, weakness/fatigue, and flank pain and was found to have a 6mm+ right distal ureteral calculus with right-sided hydronephrosis with polymicrobial bacteremia for which ID was engaged. Per my record review, patient had symptoms for a couple days prior to admission. She reported multiple episodes of black colored emesis and had no appetite. Upon arrival, patient was notable hypoxic to SpO2 88% on room air. She was noted to be febrile to 100.7F and tachycardic to 118. Labs notable for WBC 27, Scr elevated to 2.58, AST elevation to 46, lactic acid 3.8, procal elevated 230.37. Flu/COVID/RSV negative. UA obtained demonstrated moderate blood, large leukocytes, and innumerable WBC. Urine cultures pending. Patient underwent a CT chest, abdomen, and pelvis which showed a 6mm right distal ureteral calculus with right-sided hydronephrosis with perinephric and periureteral stranding. Colonic diverticulosis without evidence of diverticulitis. CT chest did not show any evidence of consolidation. Moderate cardiomegaly noted. Patient underwent a cystoscopy with right ureteral stent placement.  Admission blood cultures resulted positive for E.coli and E.faecalis.     REVIEW OF SYSTEMS:  A complete 12 point system-based review of systems is otherwise negative.    PAST MEDICAL HISTORY:  History reviewed. No pertinent past medical history.  History reviewed. No pertinent surgical history.    FAMILY HISTORY:  Non-contributory    SOCIAL HISTORY:  Social History   Social History     Substance and Sexual Activity   Alcohol Use Never     Social History     Substance and Sexual Activity   Drug Use Never     Social History     Tobacco Use   Smoking Status Former     Types: Cigarettes   Smokeless Tobacco Never       ALLERGIES:  Allergies   Allergen Reactions    Penicillins Swelling       MEDICATIONS:  All current active medications have been reviewed.    ANTIBIOTICS:  Cefepime D#2    S/p cipro x1  S/p vancomycin x1    PHYSICAL EXAM:  Temp:  [98 °F (36.7 °C)-100.7 °F (38.2 °C)] 98 °F (36.7 °C)  HR:  [100-118] 102  Resp:  [20-26] 22  BP: ()/(53-73) 135/69  SpO2:  [88 %-96 %] 93 %  Temp (24hrs), Av.1 °F (37.3 °C), Min:98 °F (36.7 °C), Max:100.7 °F (38.2 °C)  Current: Temperature: 98 °F (36.7 °C)    Intake/Output Summary (Last 24 hours) at 2024 1445  Last data filed at 2024 1301  Gross per 24 hour   Intake 650.5 ml   Output 550 ml   Net 100.5 ml       General Appearance:  Chronically ill appearing, sitting upright in chair next to bed, nontoxic, and in no distress.   Head:  Normocephalic, without obvious abnormality, atraumatic.   Eyes:  Conjunctiva pink and sclera anicteric, both eyes.   Throat: Oropharynx moist without lesions. Poor dentition.    Neck: Supple, symmetrical, no adenopathy, no tenderness/mass/nodules.   Back:   Difficult to assess due to patient positioning   Lungs:   Audible wheezes, but otherwise grossly clear to auscultation anteriorly bilaterally though exam was limited due to body habitus and patient positioning. On 5L NC.     Chest Wall:  No tenderness or deformity.   Heart:  RRR; no murmur, rub or gallop.   Abdomen:   Soft, non-tender, non-distended, positive bowel sounds throughout.   Extremities: No cyanosis or clubbing. +peripheral edmea.    Skin: No rashes or lesions noted to exposed skin. No draining wounds noted.   Neurologic: Alert and oriented times 3, follows commands, speech is organized and appropriate, symmetric facial movement.     LABS, IMAGING, & OTHER STUDIES:  Lab Results:  I have personally reviewed pertinent labs.  Results from last 7 days   Lab Units 24  0543 24  0210 01/28/24  2126   WBC Thousand/uL 25.47*  30.32* 27.34*   HEMOGLOBIN g/dL 12.8 12.6 13.7   PLATELETS Thousands/uL 121* 133* 148*     Results from last 7 days   Lab Units 01/29/24  0543 01/29/24 0210 01/28/24 2126   POTASSIUM mmol/L 4.6   < > 3.7   CHLORIDE mmol/L 100   < > 99   CO2 mmol/L 23   < > 23   BUN mg/dL 40*   < > 34*   CREATININE mg/dL 2.80*   < > 2.58*   EGFR ml/min/1.73sq m 15   < > 16   CALCIUM mg/dL 7.9*   < > 8.8   AST U/L 64*  --  46*   ALT U/L 16  --  11   ALK PHOS U/L 60  --  69    < > = values in this interval not displayed.     Results from last 7 days   Lab Units 01/28/24 2131 01/28/24 2126   GRAM STAIN RESULT  Gram negative rods*  Gram positive cocci in pairs* Gram negative rods*  Gram positive cocci in pairs*     Results from last 7 days   Lab Units 01/28/24 2126   PROCALCITONIN ng/ml 230.37*                   Imaging Studies:   I have personally reviewed pertinent imaging study reports and images in PACS.    1/28/24 CT Chest/A/P: I personally reviewed this study, it showed: 6mm right distal ureteral calculus with right-sided hydronephrosis with perinephric and periureteral stranding. Colonic diverticulosis without evidence of diverticulitis. CT chest did not show any evidence of consolidation. Moderate cardiomegaly noted.     Other Studies:   I have personally reviewed pertinent reports.        Noreen Meredith PA-C  [facility]

## 2024-01-29 NOTE — OCCUPATIONAL THERAPY NOTE
Occupational Therapy Evaluation     Patient Name: Loida Duarte  Today's Date: 1/29/2024  Problem List  Principal Problem:    Right ureteral calculus  Active Problems:    Severe sepsis (HCC)    Acute respiratory insufficiency    RAKEL (acute kidney injury) (HCC)    Elevated troponin    Past Medical History  History reviewed. No pertinent past medical history.  Past Surgical History  History reviewed. No pertinent surgical history.          01/29/24 1436   OT Last Visit   OT Visit Date 01/29/24   Note Type   Note type Evaluation   Pain Assessment   Pain Assessment Tool 0-10   Pain Score No Pain   Restrictions/Precautions   Weight Bearing Precautions Per Order No   Other Precautions Chair Alarm;Bed Alarm;Multiple lines;O2;Telemetry;Fall Risk  (4L O2)   Home Living   Type of Home Apartment  ([facility] [address]+ apartments)   Home Layout One level  (no JOANIE, FF apartment)   Bathroom Shower/Tub Walk-in shower  (however pt reports sponge bathing due to fear of falling in shower)   Bathroom Toilet Standard   Bathroom Equipment Grab bars in shower;Hand-held shower;Shower chair;Grab bars around toilet   Bathroom Accessibility Accessible via walker   Home Equipment Walker  (sleeps in electric bed)   Additional Comments use of rollator at baseline   Prior Function   Level of [place] Independent with ADLs   Lives With Spouse   Receives Help From Family   IADLs Independent with driving;Independent with meal prep  (pt takes no medications at baseline, however reports that she manages her 's medications)   Falls in the last 6 months (S)  5 to 10   Vocational Retired   Comments Pt reports that her spouse is bedbound at baseline and he has a caregiver 3x/wk that assists with ADLs and mobility. 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  Found to have R ureteral calculus. Brought to OR this AM for cystoscopy with insertion of ureteral stent. PMH: obesity, RAKEL   Family/Caregiver Present No   Subjective   Subjective \"My  and I will be  for 65 years on Feb 13th\"   ADL   Eating Assistance 5  Supervision/Setup   Grooming Assistance 5  Supervision/Setup   Grooming Deficit Increased time to complete;Supervision/safety;Verbal cueing;Brushing hair   UB Bathing Assistance 4  Minimal Assistance   LB Bathing Assistance 2  Maximal Assistance   UB Dressing Assistance 4  Minimal Assistance   LB Dressing Assistance 1  Total Assistance   LB Dressing Deficit Don/doff R sock;Don/doff L sock   Toileting Assistance  2  Maximal Assistance   Additional Comments Did not observe eating, UB bathing, LB bathing, UB dressing, and toileting at time of evaluation, with use of clinical reasoning, pt's performance throughout evaluation indicates that pt may be able to perform these tasks at the levels listed above   Bed Mobility   Supine to Sit 4  Minimal assistance   Additional items Assist x 1;Increased time required;Verbal cues;LE management   Additional Comments Pt with increased WOB upon sitting up at EOB. O2 sats maintaining >92%   Transfers   Sit to Stand 4  Minimal assistance   Additional items Assist x 1;Increased time required;Verbal cues   Stand to Sit 4  Minimal assistance   Additional items Assist x 1;Increased time required;Verbal cues   Additional Comments use of RW   Functional Mobility   Functional Mobility 4  Minimal assistance   Additional Comments Ax1, bed > recliner chair. Limited distance due to fatigue and increased WOB   Additional items Rolling walker   Balance "   Static Sitting Fair +   Dynamic Sitting Fair   Static Standing Poor +   Dynamic Standing Poor +   Ambulatory Poor +   Activity Tolerance   Activity Tolerance Patient limited by fatigue   Medical Staff Made Aware PT ADIA [place] Assessment   RUE Assessment WFL   LUE Assessment   LUE Assessment WFL   Hand Function   Gross Motor Coordination Functional   Fine Motor Coordination Functional   Cognition   Overall Cognitive Status Impaired   Arousal/Participation Alert;Cooperative   Attention Difficulty attending to directions   Orientation Level Oriented X4  (requiring incresed time for date and place)   Memory Decreased short term memory;Decreased recall of recent events;Decreased recall of precautions   Following Commands Follows one step commands inconsistently   Comments pleasant and cooperative, poor recall at times. tangential with conversation   Assessment   Limitation Decreased ADL status;Decreased UE strength;Decreased Safe judgement during ADL;Decreased cognition;Decreased endurance;Decreased self-care trans;Decreased high-level ADLs  (impaired pain, balance, fxnl mobility, act kellee, fxnl reach, trunk control, standing kellee, strength, fxnl sitting balance, fxnl sitting kellee, attention to task, direction following, safety awareness, insight, problem solving, learning new tasks)   Prognosis Good   Assessment Pt is a 78 y.o. female seen for OT evaluation s/p admission to [facility] on 1/28/2024 due to wheezing and nausea /vomiting. Diagnosed with Right ureteral calculus. Personal and env factors supporting pt at time of IE include (I) PLOF, accessible home environment, and FFSU. Personal and env factors inhibiting engagement in occupations include advanced age, limited social support, and current role of caregiver for  . Performance deficits that affect the pt’s occupational performance can be seen above. Due to pt's current functional limitations and medical complications pt is functioning below  baseline. Pt would benefit from continued skilled OT treatment in order to maximize safety, independence and overall performance with ADLs, functional mobility, functional transfers, and cognition in order to achieve highest level of function.   Goals   Patient Goals to go home to her    LTG Time Frame 10-14   Long Term Goal see goals listed below   Plan   Treatment Interventions ADL retraining;Functional transfer training;Endurance training;Cognitive reorientation;Patient/family training;Equipment evaluation/education;Compensatory technique education;Energy conservation;Activityengagement   Goal Expiration Date 02/08/24   OT Treatment Day 0   OT Frequency 3-5x/wk   Discharge Recommendation   Rehab Resource Intensity Level, OT II (Moderate Resource Intensity)   AM-PAC Daily Activity Inpatient   Lower Body Dressing 2   Bathing 2   Toileting 2   Upper Body Dressing 3   Grooming 3   Eating 3   Daily Activity Raw Score 15   Daily Activity Standardized Score (Calc for Raw Score >=11) 34.69   AM-PAC Applied Cognition Inpatient   Following a Speech/Presentation 3   Understanding Ordinary Conversation 3   Taking Medications 2   Remembering Where Things Are Placed or Put Away 2   Remembering List of 4-5 Errands 2   Taking Care of Complicated Tasks 2   Applied Cognition Raw Score 14   Applied Cognition Standardized Score 32.02   End of Consult   Patient Position at End of Consult Bedside chair;Bed/Chair alarm activated;All needs within reach       GOALS:      -Patient will perform grooming tasks sitting in chair with overall Mod I in order to increase overall independence    -Patient will be Mod I with UB dressing using AE and AD as needed in order to increase (I) with ADLs    -Patient will be Mod I with UB bathing using AE and AD as needed in order to increase (I) with ADLs    -Patient will be Mod A  with LB dressing with use of AE and AD as needed in order to increase (I) with ADLs    -Patient will be Mod A  with LB  bathing with use of AE and AD as needed in order to increase (I) with ADLs    -Patient will complete toileting w/ Mod A  w/ G hygiene/thoroughness in order to reduce caregiver burden    -Patient will demonstrate Supervision with bed mobility for ability to manage own comfort and initiate OOB tasks.     -Patient will perform functional transfers with Supervision to/from all surfaces using DME as needed in order to increase (I) with functional tasks    -Patient will be Supervision with functional mobility to/from bathroom for increased independence with toileting tasks    -Patient will engage in ongoing cognitive assessment in order to assist with safe discharge planning/recommendations.    -Patient will follow multi-step instructions with no VC in order to safely complete functional tasks    -Patient will independently integrate one pacing strategy into morning ADL's.    -Patient will demonstrate PLB techniques during ADL tasks with min VC        The patient's raw score on the AM-PAC Daily Activity Inpatient Short Form is 15. A raw score of less than 19 suggests the patient may benefit from discharge to post-acute rehabilitation services. Please refer to the recommendation of the Occupational Therapist for safe discharge planning.    This session, pt required and most appropriately benefited from skilled OT/PT co-eval due to significant regression from functional baseline, decreased activity tolerance, and unpredictable medical and/or functional status. OT and PT goals were addressed separately as seen in documentation.     Shanti Pitts MS, OTR/L        No

## 2024-01-29 NOTE — ANESTHESIA POSTPROCEDURE EVALUATION
Post-Op Assessment Note    CV Status:  Stable  Pain Score: 0    Pain management: adequate       Mental Status:  Alert and sleepy   Hydration Status:  Euvolemic   PONV Controlled:  Controlled   Airway Patency:  Patent     Post Op Vitals Reviewed: Yes    No anethesia notable event occurred.    Staff: SARANYA             Txf to icu monitored  BP   124/70   Temp   97   Pulse  102   Resp   18   SpO2   99

## 2024-01-29 NOTE — PLAN OF CARE
Problem: Potential for Falls  Goal: Patient will remain free of falls  Description: INTERVENTIONS:  - Educate patient/family on patient safety including physical limitations  - Instruct patient to call for assistance with activity   - Consult OT/PT to assist with strengthening/mobility   - Keep Call bell within reach  - Keep bed low and locked with side rails adjusted as appropriate  - Keep care items and personal belongings within reach  - Initiate and maintain comfort rounds  - Make Fall Risk Sign visible to staff  - Offer Toileting every 2 Hours, in advance of need  - Initiate/Maintain bed alarm  - Obtain necessary fall risk management equipment  - Apply yellow socks and bracelet for high fall risk patients  - Consider moving patient to room near nurses station  Outcome: Progressing      Statement Selected

## 2024-01-29 NOTE — ASSESSMENT & PLAN NOTE
Pt noted to be SOB, wheezing, tachypnea. Endorsed chest pain while in OR.  hsTroponin - 14,797  BNP - 2,737  LS diminished, wheezing more upper airway no stridor.   CXR unremarkable    Plan:  Cardiology consulted, appreciate recommendations  If repeat troponin has positive delta change rec: ASA/Plavix/heparin gtt  Recommend diuresis  Echo in AM  Continue to trend hsTroponins  Hold diuresis for now in setting of severe sepsis. Will consider if respiratory status worsens.  Continue supplemental oxygen

## 2024-01-29 NOTE — ED ATTENDING ATTESTATION
1/28/2024  IFausto DO, saw and evaluated the patient. I have discussed the patient with the resident/non-physician practitioner and agree with the resident's/non-physician practitioner's findings, Plan of Care, and MDM as documented in the resident's/non-physician practitioner's note, except where noted. All available labs and Radiology studies were reviewed.  I was present for key portions of any procedure(s) performed by the resident/non-physician practitioner and I was immediately available to provide assistance.       At this point I agree with the current assessment done in the Emergency Department.  I have conducted an independent evaluation of this patient a history and physical is as follows: 78yoF, no pmhx as doesn't seen physician, presenting with generalized fatigue, n/v/d x48 hours. Pt denies abd pain, change in urination, rhinorrhea, fever, chills, cough, CP, wounds. No known sick contacts. Caregiver of pt's  did note pt being tachypneic today, but pt denies SOB, CP, increased WOB despite obvious tachypnea.     Hypoxic to high 80s, RR mid 20s, tachycardic to 110, febrile, BP WNL. AAOX3. Oropharynx dry. Lungs CTA. Heart RR, tachy. Increased WOB w/ slight retraction. Abd soft, nontender. Negative CVA tenderness b/l. Legs reflect habitus, but without edema, calf tenderness, dyssymmetry.     78yoF presenting with urosepsis secondary to stone at UVJ. CBC with leuks and bandemia. CMP with RAKEL/CKD as no prior. AG slightly elevated. Lactate >3. Given hx n/v/d - source initially unclear so pan scan performed identifying stone. Additionally, no prominent edema nor obvious pulm patho identified on CT. Unclear etiology of hypoxia and WOB in this setting. EKG sinus tach w/ RBBB, LVH. Potential for mild ARDS but pt clinically not overloaded despite concern for likely new dx diastolic dysfunction given bedside US. VBG WNL. 500NS provided over 2 hours given lactate and further concern for impending  distributive shock. Vanc/Cef provided. Urology made aware and taking to OR tonight to stent. ICU presented to bedside and accept to their care.     ED Course  ED Course as of 01/29/24 1613   Sun Jan 28, 2024   2151 Sepsis identified at this time, source unclear as n/v/d primary complaint. Although w/ tachypnea, but denies SOB/respiratory complaint.    2212 Severe sepsis noted. Source remains unclear as no focal PNA on CXR. Pt with considerably enlarged cardiac silhouette and concern for possible dCHF on bedside US given prominence of LV. As pt still with resp distress, will limit fluids at this time to prevent need for bipap/intubation despite severe sepsis. Optiflow ordered given pt 5L and low 90s. Will CT Chest. If no signs of overload, will provide fluids. Also will be used to assess if focal PNA not seen on CXR.    2225 Pt to be brought to CT now   2321 Crit Care AP made aware that pt likely to require step down I or greater. Will eval pt s/p CT read. CT scan without obvious edema or PNA. Formal read pending. Will trial 500ml slowly now given lactate.          Critical Care Time  CriticalCare Time    Date/Time: 1/29/2024 4:13 PM    Performed by: Fausto Strickland DO  Authorized by: Fausto Strickland DO    Critical care provider statement:     Critical care time (minutes):  90    Critical care time was exclusive of:  Separately billable procedures and treating other patients and teaching time    Critical care was necessary to treat or prevent imminent or life-threatening deterioration of the following conditions:  Sepsis    Critical care was time spent personally by me on the following activities:  Obtaining history from patient or surrogate, development of treatment plan with patient or surrogate, discussions with consultants, evaluation of patient's response to treatment, examination of patient, re-evaluation of patient's condition, review of old charts, ordering and review of radiographic studies, ordering and review of  laboratory studies and ordering and performing treatments and interventions    I assumed direction of critical care for this patient from another provider in my specialty: no

## 2024-01-29 NOTE — H&P
[facility]  H&P  Name: Loida Duarte 78 y.o. female I MRN: [id number]  Unit/Bed#: ICU 01 I Date of Admission: 1/28/2024   Date of Service: 1/29/2024 I Hospital Day: 0      Assessment/Plan   Severe sepsis (HCC)  Assessment & Plan  As evidence by fever (100.7), tachycardia (110), tachypnea (26), leukocytosis (30)  Suspected source: Urosepsis  CT chest/ABD/pelvis - 6mm calculus in the right ureterovesical junction and moderate hydronephrosis.  Lactate 3.8  Procalcitonin - 230  Received 500cc resuscitation fluid in ED, not 30cc/kg given concerns for respiratory status as pt noted to be tachyneic, hypoxic and wheezing on arrival.  Blood cultures x 2 drawn  UA: +leukocytes, +WBC, occasional bacteria, negative nitrites  Received Vancomycin, Cefepime, Cipro in ED    Plan:  Continue ABX: Cefepime, Vanco  Follow up blood cultures  Follow up urine cultures  Trend lactate until clear  Hold IVF at this time given respiratory status  Trend WBC, fever curve.    * Right ureteral calculus  Assessment & Plan  CT chest/ABD/pelvis - CT chest/ABD/pelvis - 6mm calculus in the right ureterovesical junction and moderate hydronephrosis.  Urology consulted in ED and patient taken to OR for cysto with stent placement  Given tenuous respiratory status, pt was not intubated, given light sedation, tolerated well.  Continue ABX, see plan above  Hold IVF for now  Maintain navas catheter  Monitor I&O  Monitor renal function    Elevated troponin  Assessment & Plan  Pt noted to be SOB, wheezing, tachypnea. Endorsed chest pain while in OR.  hsTroponin - 14,797  BNP - 2,737  LS diminished, wheezing more upper airway no stridor.   CXR unremarkable    Plan:  Cardiology consulted, appreciate recommendations  If repeat troponin has positive delta change rec: ASA/Plavix/heparin gtt  Recommend diuresis  Echo in AM  Continue to trend hsTroponins  Hold diuresis for now in setting of severe sepsis. Will consider if respiratory status  worsens.  Continue supplemental oxygen    Acute respiratory insufficiency  Assessment & Plan  Pt presented with tachypnea, increased WOB and audible expiratory wheezing, SpO2 88% on room air.  No known pulmonary history, briefly smoked cigarettes many years ago.  Spo2 improved on 2L NC but wheezes persist. Albutrol x1 given in ED with minimal improvement.   LS diminished with wheeze in upper airway, no stridor  CXR - no acute cardiopulmonary abnormalities.  CT chest - No acute findings, no consolidation, pneumothorax, pleural effusion  BNP - 2,737    Plan:  Continue supplemental oxygen for goal SpO2 > 92%, currently on 5L NC  Check ABG  Xopenex as needed for wheezing    RAKEL (acute kidney injury) (HCC)  Assessment & Plan  sCr - 2.58  Unknown baseline sCr   Likely worsened in setting of severe sepsis, obstructing renal calculus  Received 500cc bolus in ED, additional fluids held given respiratory status.  Decreased UO since arrival, will consider gentle hydration with close monitoring of respiratory status  Maintain navas catheter  Strict I&O  Avoid nephrotoxins and hypotension  Monitor and replete electrolytes  Repeat BMP in AM        History of Present Illness     HPI: Loida Duarte is a 78 y.o. female with no reported past medical history as she hasn't been to a doctor in 50 years. Presented via EMS from home with N/V, diarrhea, weakness/fatigue, increased WOB. Symptoms started a few days ago, and progressively worsened. She reported multiple episodes of black colored emesis. She hasn't been able to eat anything for a few days, however was able to still take in liquids.On arrival she was tachypnea and hypoxic SpO2 88% on room air, audible wheezing. She was placed on 2L NC and SpO2 improved but tachypnea and wheezing persisted. Labs showed elevated sCr 2.58, lactic 3.8, procal 230, WBC 27, 23 bands, H/H 13.7/42.3, negative for FLU/RSV/COVID, UA +bacteria, WBC, leukocytes, negative nitrites. EKG sinus tachycardia  LBBB, LAFB. Blood cultures were drawn, given tachypnea and wheezing only 500cc resuscitation fluids were given. She received cefepime/vanco/clinda for ABX. CT chest ABD/pelvis showed a 6mm calculus in the right ureterovesical junction and moderate hydronephrosis. Urology was consulted and she was taken to the OR for cysto with stent placement which she tolerated well. Post-op she was transported to the ICU hemodynamically stable but still tachypneic and wheezing and per OR team she endorsed some chest pain. Troponin/BNP were check and troponin elevated 57586 and BNP 2737, EKG unchanged. At this time she denies chest pain, shortness of breath, increased WOB, N/V, fever/chills.    History obtained from the patient.  Review of Systems   Constitutional:  Positive for activity change, appetite change and fatigue. Negative for chills and diaphoresis.   HENT: Negative.     Eyes: Negative.    Respiratory: Negative.  Negative for cough, chest tightness and shortness of breath.    Cardiovascular:  Negative for chest pain and palpitations.   Gastrointestinal:  Positive for diarrhea and nausea. Negative for abdominal pain, constipation and vomiting.   Endocrine: Negative.    Genitourinary:  Positive for dysuria.   Musculoskeletal: Negative.    Skin: Negative.    Allergic/Immunologic: Negative.    Neurological:  Negative for dizziness, weakness, numbness and headaches.   Hematological: Negative.    Psychiatric/Behavioral: Negative.       Disposition: Stepdown Level 1  Historical Information   No past medical history on file. No past surgical history on file.   No current outpatient medications Allergies   Allergen Reactions    Penicillins Swelling      Social History     Tobacco Use    Smoking status: Former     Types: Cigarettes    Smokeless tobacco: Never   Substance Use Topics    Alcohol use: Never    Drug use: Never    History reviewed. No pertinent family history.     Objective                            Vitals I/O       Most Recent Min/Max in 24hrs   Temp 98.6 °F (37 °C) Temp  Min: 98.6 °F (37 °C)  Max: 100.7 °F (38.2 °C)   Pulse (!) 107 Pulse  Min: 102  Max: 118   Resp 22 Resp  Min: 21  Max: 26   /61 BP  Min: 97/55  Max: 140/63   O2 Sat 93 % SpO2  Min: 88 %  Max: 96 %      Intake/Output Summary (Last 24 hours) at 1/29/2024 0522  Last data filed at 1/29/2024 0400  Gross per 24 hour   Intake 350 ml   Output 100 ml   Net 250 ml       Diet NPO    Invasive Monitoring           Physical Exam   Physical Exam  Vitals and nursing note reviewed.   Eyes:      Pupils: Pupils are equal, round, and reactive to light.   Skin:     General: Skin is warm.      Capillary Refill: Capillary refill takes 2 to 3 seconds.   HENT:      Head: Normocephalic and atraumatic.      Mouth/Throat:      Mouth: Mucous membranes are dry.   Cardiovascular:      Rate and Rhythm: Regular rhythm. Tachycardia present.      Pulses:           Radial pulses are 2+ on the right side and 2+ on the left side.        Dorsalis pedis pulses are 2+ on the right side and 2+ on the left side.      Heart sounds: Normal heart sounds.   Musculoskeletal:      Cervical back: Full passive range of motion without pain, normal range of motion and neck supple.      Right lower leg: Edema present.      Left lower leg: Edema present.   Abdominal: General: Bowel sounds are normal. There is no distension.      Palpations: Abdomen is soft.      Tenderness: There is abdominal tenderness.   Constitutional:       General: She is awake. She is not in acute distress.     Appearance: She is well-developed and well-nourished. She is obese. She is ill-appearing.   Pulmonary:      Effort: Tachypnea present.      Breath sounds: Examination of the right-upper field reveals decreased breath sounds. Examination of the left-upper field reveals decreased breath sounds. Examination of the right-middle field reveals decreased breath sounds. Examination of the left-middle field reveals decreased breath  sounds. Examination of the right-lower field reveals decreased breath sounds. Examination of the left-lower field reveals decreased breath sounds. Decreased breath sounds present.      Comments: Expiratory wheeze noted upper airway, no stridor, airway patent  Psychiatric:         Attention and Perception: She is inattentive.         Mood and Affect: Mood is anxious.         Behavior: Behavior is cooperative.         Judgment: Judgment is impulsive.   Neurological:      Mental Status: She is alert, oriented to person, place, and time and oriented to person, place and time.      GCS: GCS eye subscore is 4. GCS verbal subscore is 5. GCS motor subscore is 6.      Sensory: Sensation is intact.   Genitourinary/Anorectal:     Comments: Urethral catheter         Diagnostic Studies      EKG: Sinus tachycardia, RBBB, LAFB  Imaging: I have personally reviewed pertinent reports.   and I have personally reviewed pertinent films in PACS  CXR - no acute cardiopulmonary abnormalities  CT chest/ABD/pelvis -      Medications:  Scheduled PRN   cefepime, 2,000 mg, Q24H  chlorhexidine, 15 mL, Q12H GUNJAN  heparin (porcine), 5,000 Units, Q8H GUNJAN  vancomycin, 15 mg/kg, Q12H      levalbuterol, 1.25 mg, Q6H PRN       Continuous          Labs:    CBC    Recent Labs     01/28/24 2126 01/29/24  0210   WBC 27.34* 30.32*   HGB 13.7 12.6   HCT 42.3 39.2   * 133*   BANDSPCT 23*  --      BMP    Recent Labs     01/28/24 2126 01/29/24  0210   SODIUM 135 134*   K 3.7 3.9   CL 99 100   CO2 23 23   AGAP 13 11   BUN 34* 37*   CREATININE 2.58* 2.75*   CALCIUM 8.8 7.9*       Coags    Recent Labs     01/28/24 2126   INR 1.22*   PTT 36        Additional Electrolytes  Recent Labs     01/29/24  0210   MG 1.5*   PHOS 3.5   CAIONIZED 1.03*          Blood Gas    Recent Labs     01/29/24  0308   PHART 7.317*   XFT5HBD 42.2   PO2ART 74.8*   SEG8BOH 21.1*   BEART -4.8   SOURCE Radial, Right     Recent Labs     01/28/24 2128 01/29/24  0308   PHVEN 7.358  --     VFH3UUN 41.4*  --    PO2VEN 33.5*  --    MVY2DFG 22.8*  --    BEVEN -2.6  --    F3ATOBL 62.4  --    SOURCE  --  Radial, Right    LFTs  Recent Labs     01/28/24 2126   ALT 11   AST 46*   ALKPHOS 69   ALB 3.6   TBILI 0.71       Infectious  Recent Labs     01/28/24 2126   PROCALCITONI 230.37*     Glucose  Recent Labs     01/28/24 2126 01/29/24  0210   GLUC 84 86        Critical Care Time Statement: Upon my evaluation, this patient had a high probability of imminent or life-threatening deterioration due to severe sepsis, 6mm right ureteral calculus with hydronephrosis, RAKEL, respiratory insufficiency, which required my direct attention, intervention, and personal management.  I spent a total of 65 minutes directly providing critical care services, including interpretation of complex medical databases, evaluating for the presence of life-threatening injuries or illnesses, management of organ system failure(s) , complex medical decision making (to support/prevent further life-threatening deterioration)., interpretation of hemodynamic data, and ventilator management. This time is exclusive of procedures, teaching, family meetings, and any prior time recorded by providers other than myself.      Anticipated Length of Stay is > 2 midnights  MARLENE Ferguson

## 2024-01-29 NOTE — PROGRESS NOTES
Loida Duarte is a 78 y.o. female who is currently ordered Vancomycin IV with management by the Pharmacy Consult service.  Relevant clinical data and objective / subjective history reviewed.  Vancomycin Assessment:  Indication and Goal AUC/Trough: Urinary tract infection (goal -600, trough >10); Bacteremia (goal -600, trough >10)  Clinical Status:  critical care  Micro:   Pending    Renal Function:  SCr: 2.75 mg/dL  CrCl: 18.3 mL/min  Renal replacement: Not on dialysis  Days of Therapy: 1  Current Dose: 1750mg (25mg/kg) loading dose given  Vancomycin Plan:  New Dosing: pulse-dose 15mg/kg for random vanco level <15  Estimated AUC: n/a for pulse-dosing  Estimated Trough: n/a for pulse-dosing  Next Level: 1/29 @ 1200  Renal Function Monitoring: Daily BMP and UOP  Pharmacy will continue to follow closely for s/sx of nephrotoxicity, infusion reactions and appropriateness of therapy.  BMP and CBC will be ordered per protocol. We will continue to follow the patient’s culture results and clinical progress daily.    Jyoti Blanchard, Pharmacist

## 2024-01-29 NOTE — QUICK NOTE
Patient seen in follow-up at the bedside.  Family is with her and visiting.  She is having no significant complaints from a urologic standpoint.  Stent is indwelling and urethral King catheter is draining.  Patient continues to have visible signs of respiratory distress.  Remains in the intensive care unit.    I discussed with the patient and her family that the stent was placed due to obstructed urosepsis.  No additional acute inpatient plans from a urologic standpoint.  Once the patient has recovered, will need outpatient surgical intervention on the right distal stone.  Patient will likely need medical clearance given her medical comorbidities.  I was very careful to describe the stent as being a nonpermanent drainage system and we reviewed that the stone is still present and will need to be retrieved.  Patient and her family voiced understanding.    Urology will sign off at this point in time.  Urethral King catheter can be removed by the primary service once the patient's acute infection and respiratory issues have resolved.  Outpatient surgical scheduling for second stage right ureteroscopy will be coordinated by the urology team.

## 2024-01-29 NOTE — UTILIZATION REVIEW
"Initial Clinical Review    Admission: Date/Time/Statement:   Admission Orders (From admission, onward)       Ordered        01/29/24 0015  INPATIENT ADMISSION  Once                          Orders Placed This Encounter   Procedures    INPATIENT ADMISSION     Standing Status:   Standing     Number of Occurrences:   1     Order Specific Question:   Level of Care     Answer:   Level 1 Stepdown [13]     Order Specific Question:   Estimated length of stay     Answer:   More than 2 Midnights     Order Specific Question:   Certification     Answer:   I certify that inpatient services are medically necessary for this patient for a duration of greater than two midnights. See H&P and MD Progress Notes for additional information about the patient's course of treatment.     ED Arrival Information       Expected   -    Arrival   1/28/2024 21:19    Acuity   Emergent              Means of arrival   Ambulance    Escorted by   Greene Ems    Service   Critical Care/ICU    Admission type   Emergency              Arrival complaint   Vomiting             Chief Complaint   Patient presents with    Wheezing     Pt arrived via EMS from home for N/V, weakness. On arrival pt is wheezing, increased work of breathing. Pt states \" I haven't been to a doctor in 50 years\".     Vomiting       Initial Presentation: 78 y.o. female from home to ED via ems on 1/28/24 and inpatient order placed 1/29/24 due to  Sepsis due to right ureteral calculus/UTI/E coli, Enterococcus faecalis uremia/acute hypoxic respiratory failure with suspected element of pulmonary edema and intermittent bronchospasm./NSTEMI due to sepsis/RAKEL.  No medical care in last 50 years, on no medication.    Presented due to nausea, vomiting, weakness starting day prior to arrival.  Unable to tolerate po, multiple episodes of black emesis. On arrival she was hypoxic to 88%, tachypneic with increased work of breathing.   Mucous membranes dry.   Tachycardia.  Obese.  UA- large " leukocytes.  Ketones.  Procalcitonin 230.37.   wbc 27.34.  bands 23 %.  Lactic acid 3.8.   INR 1.22.  bun 34.  Creatinine 2.58 with unknown baseline.  Bedside cardiac/lung US showed no pericardial effusion, right heart strapleural effusions   Ct abdomen showed: 6mm stone in R UVJ, and moderate hydroureteronephrosis.  Blood cultures gram stain gram negative rods.  In the ED given IV acetaminophen, started on cefepime and vancomycin,  placed on oxygen and albuterol neb given.    Urology consulted.  To OR. In the OR, complaints of chest pain.   Hs troponin 14, 797.  BNP 2737.   Wheezing upper airway.    Post procedure to level 1:    continue antibiotics.  Monitor fever curve, WBC.   Follow cultures.   Monitor oxygen sat.  On asa, Plavix and Heparin.   Echo.  Consult Cardiology.       1/29/24 per Urology - patient with low-grade fever, sepsis, lactic acidosis, leukocytosis, and hydronephrosis all secondary to a 6-7 mm distal right ureteral calculus. Recommend urgent cystoscopy with right retrograde pyelography and right ureteral stent insertion in the operating room   1/29/24 procedure:  Right - CYSTOSCOPY RETROGRADE PYELOGRAM WITH INSERTION STENT URETERAL   Findings Distal right ureteral calculus, right-sided hydronephrosis, body appearing urine from the right ureteral orifice upon wire and stent insertion     1/29/24  per Cardiology - patient elevated troponin and suspect non ischemic myocardial injury in setting of sepsis/Urosepsis secondary to right Ureteral calculus/acute hypoxic respiratory failure/RAKEL/Dyslipidemia/Morbid Obesity.   Will need eventual ischemic evaluation.   Check echo.  Strict I/Os, daily weights, am BMP.  Asa and statin.   Hold beta blocker.  .    1/29/24 per ID - patient with severe Sepsis/Polymicrobial bacteremia/Obstructive pyelonephritis/RAKEL/acute hypoxic respiratory failure likely due to pulmonary edema.  Plan: Discontinue cefepime.  Start IV vancomycin and ceftriaxone.  Check LUC to help  definitively rule out endocarditis     Date: 1/30/24   Day 2:  overnight given Xopenex neb and felt less short of breath.   On exam: wheezing.  Bilateral LE edema.   Bun 54.  Creatinine 2.89.   blood cultures  x 1 gram positive cocci in pairs, gram negative rods.  Blood cultures x 1 Escherichia coli, enterococcus faecalis. Continue ABX: Ceftriaxone, Vanco.  Follow cultures. Hold IVF.  Continue navas.   Continue supplemental oxygen for goal SpO2 > 92%, currently on 5L NC. Xopenex as needed for wheezing        ED Triage Vitals   Temperature Pulse Respirations Blood Pressure SpO2   01/28/24 2123 01/28/24 2123 01/28/24 2123 01/28/24 2123 01/28/24 2123   (!) 100.7 °F (38.2 °C) (!) 118 (!) 26 132/65 (!) 88 %      Temp Source Heart Rate Source Patient Position - Orthostatic VS BP Location FiO2 (%)   01/28/24 2123 01/28/24 2123 01/28/24 2123 01/28/24 2123 --   Oral Monitor Sitting Right arm       Pain Score       01/29/24 0057       8          Wt Readings from Last 1 Encounters:   01/30/24 109 kg (239 lb 13.8 oz)     Additional Vital Signs:   01/30/24 0700 98.9 °F (37.2 °C) 69 18 100/53 73 97 % -- -- Nasal cannula Lying   01/30/24 0600 -- 77 21 -- -- 94 % -- -- -- --   01/30/24 0500 -- 72 23 Abnormal  -- -- 96 % -- -- -- --   01/30/24 0400 -- 75 18 -- -- 95 % -- -- -- --   01/30/24 0300 -- 79 22 -- -- 96 % -- -- -- --   01/30/24 0252 -- -- -- -- -- 96 % 40 5 L/min Nasal cannula --   01/30/24 0212 98 °F (36.7 °C) 89 20 104/56 76 95 % -- -- Nasal cannula Lying   01/30/24 0200 -- 84 20 -- -- 94 % -- -- -- --   01/30/24 0127 -- -- -- 138/64 92 -- -- -- -- --   01/30/24 0100 -- 73 20 -- -- 95 % -- -- -- --   01/30/24 0000 -- 69 22 -- -- 90 % -- -- -- --   01/29/24 2330 -- -- -- 167/77 111 -- -- -- -- --   01/29/24 2300 -- -- 21 -- -- -- -- -- -- --   01/29/24 2200 98 °F (36.7 °C) 88 20 -- -- 93 % -- -- -- --   01/29/24 2100 -- 87 23 Abnormal  -- -- 93 % -- -- -- --   01/29/24 2000 -- 90 21 110/55 77 92 % -- -- -- --   01/29/24  1943 °F (36.6 °C) 85 20 -- -- 92 % -- -- --      01/29/24 1100 -- 102 -- 135/69 95 93 % -- -- -- --   01/29/24 1055 98 °F (36.7 °C) -- -- 135/69 95 94 % -- -- Nasal cannula Lying   01/29/24 0931 -- 100 -- 122/56 -- -- -- -- -- --   01/29/24 0750 98.9 °F (37.2 °C) 104 22 122/56 -- 93 % 40 5 L/min Nasal cannula Lying   01/29/24 0600 -- 103 20 -- -- 92 % -- -- -- --   01/29/24 0500 -- 101 21 -- -- 92 % -- -- -- --   01/29/24 0405 -- -- -- 131/61 88 -- -- -- -- --   01/29/24 0357 -- -- -- 109/53 76 -- -- -- -- --   01/29/24 0300 -- 107 Abnormal  22 -- -- 93 % -- -- Nasal cannula --   01/29/24 0209 98.6 °F (37 °C) 104 21 140/63 90 94 % 40 5 L/min Nasal cannula Lying   01/28/24 2330 -- 103 -- 118/73 88 96 % -- -- -- --   01/28/24 2200 -- 110 Abnormal  -- 99/56 69 93 % -- -- -- --   01/28/24 2145 -- 114 Abnormal  24 Abnormal  127/66 87 91 % 28 2 L/min Nasal cannula --   01/28/24 2130 -- 115 Abnormal  24 Abnormal  132/65 92 93 % 28 2 L/min Nasal cannula Sitting   01/28/24 2127 -- -- -- -- -- 92 % 28 2 L/min Nasal cannula      Pertinent Labs/Diagnostic Test Results:   FL retrograde pyelogram   Final Result by Clint Yanez MD (01/29 9731)      Fluoroscopic guidance provided for right retrograde pyelogram. Please see procedure report for further details.      Workstation performed: [id number]         CT chest abdomen pelvis wo contrast   ED Interpretation by Fred Espinoza MD (01/29 0010)   Read on [facility].    CT Chest  IMPRESSION:   1. No acute findings in the chest.   2. For details regarding the abdominal and pelvic findings, refer to the CT abdomen and pelvis report below      CT Abd/Pel  IMPRESSION:   1. 6 mm calculus in the right ureterovesical junction and moderate right hydroureteronephrosis.    2. Cholelithiasis without CT evidence for cholecystitis.   3. Colonic diverticulosis without evidence for diverticulitis.   4. For details regarding the chest findings, refer to the CT chest report above.          Final Result by Marisa Pinon MD (01/29 0819)   Trace right pleural fluid.   Moderate cardiomegaly.   6 mm right UVJ calculus. Moderate right hydroureteronephrosis. Perinephric and periureteral stranding.   Cholelithiasis. No radiographic evidence of cholecystitis.   Colonic diverticulosis.   Other findings, as per the body of the report.         Preliminary report from [facility] was provided on 1/28/2024 at 11:35 p.m.         Workstation performed: [id number]         XR chest 1 view portable   ED Interpretation by Fausto Strickland DO (01/28 8471)   Cardiomegaly, no other acute dz      Final Result by Tereso Briggs MD (01/29 1303)      Enlarged cardiomediastinal silhouette. No apical pleural capping.                  Workstation performed: [id number]           1/28/24 ecg Sinus tachycardia  Right bundle branch block  Left anterior fascicular block   Bifascicular block   Left ventricular hypertrophy with repolarization abnormality  Inferior infarct , age undetermined  Abnormal ECG  No previous ECGs available    1/29/24  echo Left Ventricle: Left ventricular cavity size is normal. Wall thickness is moderately increased. The left ventricular ejection fraction is 65%. Systolic function is normal. Diastolic function is mildly abnormal, consistent with grade I (abnormal) relaxation.    The following segments are hypokinetic: basal inferoseptal and basal inferior.    All other segments are normal.    Right Ventricle: Right ventricular cavity size is normal. Systolic function is normal.    Left Atrium: The atrium is mildly dilated.    Mitral Valve: There is moderate thickening involving the leaflet margin more than the base. There is mild annular calcification. There is mild regurgitation.    Tricuspid Valve: There is mild regurgitation    Results from last 7 days   Lab Units 01/28/24 2128   SARS-COV-2  Negative     Results from last 7 days   Lab Units 01/29/24  0543 01/29/24  0210 01/28/24 2126   WBC Thousand/uL 25.47*  30.32* 27.34*   HEMOGLOBIN g/dL 12.8 12.6 13.7   HEMATOCRIT % 39.1 39.2 42.3   PLATELETS Thousands/uL 121* 133* 148*   NEUTROS ABS Thousands/µL 22.11*  --   --    BANDS PCT %  --   --  23*     Results from last 7 days   Lab Units 01/30/24  0615 01/29/24  0543 01/29/24 0210 01/28/24 2126   SODIUM mmol/L 133* 134* 134* 135   POTASSIUM mmol/L 4.0 4.6 3.9 3.7   CHLORIDE mmol/L 101 100 100 99   CO2 mmol/L 22 23 23 23   ANION GAP mmol/L 10 11 11 13   BUN mg/dL 54* 40* 37* 34*   CREATININE mg/dL 2.89* 2.80* 2.75* 2.58*   EGFR ml/min/1.73sq m 14 15 15 16   CALCIUM mg/dL 8.6 7.9* 7.9* 8.8   CALCIUM, IONIZED mmol/L 1.10* 1.01* 1.03*  --    MAGNESIUM mg/dL 2.3 2.2 1.5*  --    PHOSPHORUS mg/dL 3.9 4.3* 3.5  --      Results from last 7 days   Lab Units 01/29/24  0543 01/28/24 2126   AST U/L 64* 46*   ALT U/L 16 11   ALK PHOS U/L 60 69   TOTAL PROTEIN g/dL 6.4 7.0   ALBUMIN g/dL 3.3* 3.6   TOTAL BILIRUBIN mg/dL 0.81 0.71     Results from last 7 days   Lab Units 01/30/24  0615 01/29/24  0543 01/29/24 0210 01/28/24 2126   GLUCOSE RANDOM mg/dL 106 74 86 84     Results from last 7 days   Lab Units 01/29/24  0308   PH ART  7.317*   PCO2 ART mm Hg 42.2   PO2 ART mm Hg 74.8*   HCO3 ART mmol/L 21.1*   BASE EXC ART mmol/L -4.8   O2 CONTENT ART mL/dL 17.6   O2 HGB, ARTERIAL % 92.6*   ABG SOURCE  Radial, Right     Results from last 7 days   Lab Units 01/28/24 2128   PH PAOLO  7.358   PCO2 PAOLO mm Hg 41.4*   PO2 PAOLO mm Hg 33.5*   HCO3 PAOLO mmol/L 22.8*   BASE EXC PAOLO mmol/L -2.6   O2 CONTENT PAOLO ml/dL 12.9   O2 HGB, VENOUS % 62.4     Results from last 7 days   Lab Units 01/29/24  0543 01/29/24  0410 01/29/24  0210   HS TNI 0HR ng/L  --   --  14,797*   HS TNI 2HR ng/L  --  11,339*  --    HSTNI D2 ng/L  --  -3,458  --    HS TNI 4HR ng/L 12,709*  --   --    HSTNI D4 ng/L -2,088  --   --      Results from last 7 days   Lab Units 01/30/24  0615 01/29/24  2327 01/29/24  1407 01/29/24  0748 01/28/24  2126   PROTIME seconds  --   --   --  18.2*  16.1*   INR   --   --   --  1.43* 1.22*   PTT seconds 95* 100* 162* 45* 36     Results from last 7 days   Lab Units 01/28/24 2126   PROCALCITONIN ng/ml 230.37*     Results from last 7 days   Lab Units 01/29/24  0410 01/29/24  0210 01/29/24  0026 01/28/24 2126   LACTIC ACID mmol/L 1.9 2.4* 2.0 3.8*     Results from last 7 days   Lab Units 01/29/24  0210   BNP pg/mL 2,737*     Results from last 7 days   Lab Units 01/28/24  2230   CLARITY UA  Extra Turbid   COLOR UA  Yellow   SPEC GRAV UA  1.022   PH UA  5.5   GLUCOSE UA mg/dl Negative   KETONES UA mg/dl 10 (1+)*   BLOOD UA  Moderate*   PROTEIN UA mg/dl 200 (2+)*   NITRITE UA  Negative   BILIRUBIN UA  Negative   UROBILINOGEN UA (BE) mg/dl <2.0   LEUKOCYTES UA  Large*   WBC UA /hpf Innumerable*   RBC UA /hpf 10-20*   BACTERIA UA /hpf Occasional   EPITHELIAL CELLS WET PREP /hpf Occasional   MUCUS THREADS  Occasional*     Results from last 7 days   Lab Units 01/28/24 2128   INFLUENZA A PCR  Negative   INFLUENZA B PCR  Negative   RSV PCR  Negative     Results from last 7 days   Lab Units 01/28/24 2131 01/28/24 2126   GRAM STAIN RESULT  Gram negative rods*  Gram positive cocci in pairs* Gram negative rods*  Gram positive cocci in pairs*     ED Treatment:   Medication Administration from 01/28/2024 2119 to 01/29/2024 0057         Date/Time Order Dose Route Action Comments     01/28/2024 2148 EST acetaminophen (Ofirmev) injection 1,000 mg 1,000 mg Intravenous New Bag --     01/28/2024 2216 EST cefepime (MAXIPIME) 2 g/50 mL dextrose IVPB 2,000 mg Intravenous New Bag --     01/28/2024 2352 EST vancomycin (VANCOCIN) 1,750 mg in sodium chloride 0.9 % 500 mL IVPB 1,750 mg Intravenous New Bag --     01/28/2024 2319 EST sodium chloride 0.9 % bolus 500 mL 500 mL Intravenous New Bag --     01/29/2024 0011 EST albuterol inhalation solution 2.5 mg 2.5 mg Nebulization Given --          History reviewed. No pertinent past medical history.  Present on  Admission:  **None**      Admitting Diagnosis: Tachypnea [R06.82]  Nephrolithiasis [N20.0]  UTI (urinary tract infection) [N39.0]  Hypoxia [R09.02]  Bandemia [D72.825]  Severe sepsis (HCC) [A41.9, R65.20]  Age/Sex: 78 y.o. female  Admission Orders: 1/29/24 0015 inpatient to ICU/Critical Care   Scheduled Medications:  [START ON 1/30/2024] aspirin, 81 mg, Oral, Daily  atorvastatin, 80 mg, Oral, QPM  cefepime, 1,000 mg, Intravenous, Q12H dc 1/29/24 at 1544   chlorhexidine, 15 mL, Mouth/Throat, Q12H GUNJAN  clopidogrel, 75 mg, Oral, Daily    aspirin chewable tablet 325 mg  Dose: 325 mg  Freq: Once Route: PO  Start: 01/29/24 0745 End: 01/29/24 0757   heparin (porcine) injection 4,000 Units  Dose: 4,000 Units  Freq: Once Route: IV  Start: 01/29/24 0800 End: 01/29/24 0758     calcium gluconate 2 g in sodium chloride 0.9% 100 mL (premix)  Dose: 2 g  Freq: Once Route: IV  Last Dose: Stopped (01/29/24 0935)  Start: 01/29/24 0715 End: 01/29/24 0935   calcium gluconate 2 g in sodium chloride 0.9% 100 mL (premix)  Dose: 2 g  Freq: Once Route: IV  Last Dose: 2 g (01/30/24 0754)  Start: 01/30/24 0700 End: 01/30/24 0854   calcium gluconate 1 g in sodium chloride 0.9% 50 mL (premix)  Dose: 1 g  Freq: Once Route: IV  Last Dose: 1 g (01/30/24 0911)  Start: 01/30/24 0800     heparin (porcine) subcutaneous injection 5,000 Units  Dose: 5,000 Units  Freq: Every 8 hours scheduled Route: SC  Start: 01/29/24 0600 End: 01/29/24 0609     magnesium sulfate 2 g/50 mL IVPB (premix) 2 g  Dose: 2 g  Freq: Once Route: IV  Last Dose: Stopped (01/29/24 0447)  Start: 01/29/24 0245 End: 01/29/24 0447     ondansetron (ZOFRAN) injection 4 mg  Dose: 4 mg  Freq: Once Route: IV  Start: 01/29/24 0645 End: 01/29/24 0650   vancomycin (VANCOCIN) 1,750 mg in sodium chloride 0.9 % 500 mL IVPB  Dose: 25 mg/kg  Weight Dosing Info: 71.3 kg (Adjusted)  Freq: Once Route: IV  Last Dose: Stopped (01/29/24 0211)  Start: 01/28/24 2215 End: 01/29/24 0211     cefTRIAXone  (ROCEPHIN) 2,000 mg in dextrose 5 % 50 mL IVPB  Dose: 2,000 mg  Freq: Every 24 hours Route: IV  Last Dose: 2,000 mg (01/29/24 1618)  Start: 01/29/24 1545   vancomycin (VANCOCIN) IVPB (premix in dextrose) 1,000 mg 200 mL  Dose: 15 mg/kg  Weight Dosing Info: 70.9 kg (Adjusted)  Freq: Once Route: IV  Last Dose: 1,000 mg (01/30/24 0754)  Start: 01/30/24 0700 End: 01/30/24 0854     Continuous IV Infusions:  heparin (porcine), 3-20 Units/kg/hr (Order-Specific), Intravenous, Titrated    multi-electrolyte (PLASMALYTE-A/ISOLYTE-S PH 7.4) IV solution  Rate: 100 mL/hr Dose: 100 mL/hr  Freq: Continuous Route: IV  Start: 01/30/24 0900 End: 01/30/24 1359     PRN Meds:  acetaminophen, 975 mg, Oral, Q6H PRN x 1   heparin (porcine), 2,000 Units, Intravenous, Q6H PRN  heparin (porcine), 4,000 Units, Intravenous, Q6H PRN  levalbuterol, 1.25 mg, Nebulization, Q6H PRN  trimethobenzamide, 200 mg, Intramuscular, Q6H PRN x 1 1/29  vancomycin, 15 mg/kg (Adjusted), Intravenous, Daily PRN    Cardio pulmonary monitoring  Neuro check every 4 hours.   Oxygen to keep sat > 90%  1/29/24 clears     IP CONSULT TO UROLOGY  IP CONSULT TO CARDIOLOGY    Network Utilization Review Department  ATTENTION: Please call with any questions or concerns to [phone] and carefully listen to the prompts so that you are directed to the right person. All voicemails are confidential.   For Discharge needs, contact Care Management DC Support Team at [phone] opt. 2  Send all requests for admission clinical reviews, approved or denied determinations and any other requests to dedicated fax number below belonging to the campus where the patient is receiving treatment. List of dedicated fax numbers for the Facilities:  FACILITY NAME UR FAX NUMBER   ADMISSION DENIALS (Administrative/Medical Necessity) [phone]   DISCHARGE SUPPORT TEAM (NETWORK) [phone]   PARENT CHILD HEALTH (Maternity/NICU/Pediatrics) [phone]   [facility]  [phone]   [facility] [phone]   [facility] [phone]   [facility] [phone]   [facility] [phone]   [facility] [phone]   [facility] [phone]   [facility] [phone]   [facility] [phone]   [facility] [phone]   [facility] [phone]   [facility] [phone]

## 2024-01-29 NOTE — ASSESSMENT & PLAN NOTE
As evidence by fever (100.7), tachycardia (110), tachypnea (26), leukocytosis (30)  Suspected source: Urosepsis  CT chest/ABD/pelvis - 6mm calculus in the right ureterovesical junction and moderate hydronephrosis.  Lactate 3.8  Procalcitonin - 230  Received 500cc resuscitation fluid in ED, not 30cc/kg given concerns for respiratory status as pt noted to be tachyneic, hypoxic and wheezing on arrival.  Blood cultures x 2 drawn  UA: +leukocytes, +WBC, occasional bacteria, negative nitrites  Received Vancomycin, Cefepime, Cipro in ED    Plan:  Continue ABX: Cefepime, Vanco  Follow up blood cultures  Follow up urine cultures  Trend lactate until clear  Hold IVF at this time given respiratory status  Trend WBC, fever curve.

## 2024-01-29 NOTE — CASE MANAGEMENT
Case Management Assessment & Discharge Planning Note    Patient name Loida Duarte  Location ICU ICU  MRN [id number]  : 1945 Date 2024       Current Admission Date: 2024  Current Admission Diagnosis:Right ureteral calculus   Patient Active Problem List    Diagnosis Date Noted    Right ureteral calculus 2024    Severe sepsis (HCC) 2024    Acute respiratory insufficiency 2024    RAKEL (acute kidney injury) (HCC) 2024    Elevated troponin 2024      LOS (days): 0  Geometric Mean LOS (GMLOS) (days):   Days to GMLOS:     OBJECTIVE:    Risk of Unplanned Readmission Score: 15.23         Current admission status: Inpatient       Preferred Pharmacy: No Pharmacies Listed  Primary Care Provider: No primary care provider on file.    Primary Insurance:   Secondary Insurance:     ASSESSMENT:  Active Health Care Proxies    There are no active Health Care Proxies on file.       Patient Information  Admitted from:: Home  Mental Status: Alert  During Assessment patient was accompanied by: Not accompanied during assessment  Assessment information provided by:: Patient  Primary Caregiver: Self  Support Systems: Self, Spouse/significant other, Son, Daughter  County of Residence: [place]  What city do you live in?: [facility]  Home entry access options. Select all that apply.: No steps to enter home  Type of Current Residence: 2 story home  Upon entering residence, is there a bedroom on the main floor (no further steps)?: Yes  Upon entering residence, is there a bathroom on the main floor (no further steps)?: Yes  Living Arrangements: Lives w/ Spouse/significant other    Activities of Daily Living Prior to Admission  Functional Status: Independent  Completes ADLs independently?: Yes  Ambulates independently?: Yes  Does patient use assisted devices?: Yes  Assisted Devices (DME) used: Walker  Does patient currently own DME?: Yes  What DME does the patient currently own?: Walker  Does  patient have a history of Outpatient Therapy (PT/OT)?: No  Does the patient have a history of Short-Term Rehab?: No  Does patient have a history of HHC?: No  Does patient currently have HHC?: No    Patient Information Continued  Income Source: SSI/SSD  Does patient have prescription coverage?: No  Does patient receive dialysis treatments?: No  Does patient have a history of substance abuse?: No  Does patient have a history of Mental Health Diagnosis?: No    Means of Transportation  Means of Transport to Appts:: Drives Self    Housing Stability: Unknown (1/29/2024)    Housing Stability Vital Sign     Unable to Pay for Housing in the Last Year: No     Number of Places Lived in the Last Year: Not on file     Unstable Housing in the Last Year: No   Food Insecurity: No Food Insecurity (1/29/2024)    Hunger Vital Sign     Worried About Running Out of Food in the Last Year: Never true     Ran Out of Food in the Last Year: Never true   Transportation Needs: Unknown (1/29/2024)    PRAPARE - Transportation     Lack of Transportation (Medical): No     Lack of Transportation (Non-Medical): Not on file   Utilities: Not At Risk (1/29/2024)    [facility] Utilities     Threatened with loss of utilities: No     DISCHARGE DETAILS:    Discharge planning discussed with:: pt    Other Referral/Resources/Interventions Provided:  Interventions: Other (Specify)  Referral Comments: CM met with pt at bedside and introduced self/role with dcp. Pt reports she resides with her . Pt reports her . Pt reports that she helps her  at home and he also has HA support from [employer] via W[employer]. Pt reports she has a son - Missy Cervantes [phone] and Daughter, Nancy who are very supportive. Pt reports she only has Medicare Part A. CM did email financial counselors to verify medicare part A and a PATHs referral. Pt aware CM will f/u with her and her family regarding dcp. PT/OT to evaluate. Pt reports she would not want Rehab if recommended.  Aware CM will f/u with her and the family.

## 2024-01-29 NOTE — ASSESSMENT & PLAN NOTE
sCr - 2.58  Unknown baseline sCr   Likely worsened in setting of severe sepsis, obstructing renal calculus  Received 500cc bolus in ED, additional fluids held given respiratory status.  Decreased UO since arrival, will consider gentle hydration with close monitoring of respiratory status  Maintain navas catheter  Strict I&O  Avoid nephrotoxins and hypotension  Monitor and replete electrolytes  Repeat BMP in AM

## 2024-01-29 NOTE — PHYSICAL THERAPY NOTE
PHYSICAL THERAPY EVALUATION  DATE: 01/29/24  TIME: 6362-4300    NAME:  Loida Duarte  AGE:   78 y.o.  Mrn:   [id number]  Length Of Stay: 0    ADMIT DX:  Tachypnea [R06.82]  Nephrolithiasis [N20.0]  UTI (urinary tract infection) [N39.0]  Hypoxia [R09.02]  Bandemia [D72.825]  Severe sepsis (HCC) [A41.9, R65.20]    History reviewed. No pertinent past medical history.  History reviewed. No pertinent surgical history.    Performed at least 2 patient identifiers during session: Name, Birthday, and ID bracelet     01/29/24 1409   PT Last Visit   PT Visit Date 01/29/24   Note Type   Note type Evaluation   Pain Assessment   Pain Assessment Tool 0-10   Pain Score No Pain   Effect of Pain on Daily Activities n/a   Hospital Pain Intervention(s) Repositioned;Ambulation/increased activity   Restrictions/Precautions   Weight Bearing Precautions Per Order No   Other Precautions Chair Alarm;Bed Alarm;Multiple lines;O2;Fall Risk;Pain  (4L O2 via NC)   Home Living   Type of Home Apartment   Home Layout One level  (first floor apartment, no JOANIE, elevator access to remainer of apartment building)   Bathroom Shower/Tub Walk-in shower  (pt reports being fearful of getting into shower since multiple falls, sponge bathes on toilet)   Bathroom Toilet Standard   Bathroom Equipment Grab bars in shower;Shower chair;Hand-held shower;Grab bars around toilet   Bathroom Accessibility Accessible via walker   Home Equipment Walker   Additional Comments Sleeps in semi-automatic bed. Pt ambulates with rollator walker for all mobility (household and short community distance). No home O2 at baseline.   Prior Function   Level of [place] Independent with ADLs;Independent with functional mobility   Lives With Spouse   Receives Help From Family   IADLs Independent with driving;Family/Friend/Other provides transportation  (pt reports not previously taking meds, but takes care of spouse's meds)   Falls in the last 6 months (S)  5 to 10   Vocational  "Self employed   Comments Spouse has caregiver daily to assist him with ADLs and mobility, pt's spouse mostly bedbound. Pt also provides assistance to spouse for ADLs and mobility.   General   Additional Pertinent History Pt is an 78 yr old male admitted 1/29/24 with nausea, vomiting, weakness, wheezing.   Family/Caregiver Present No   Cognition   Overall Cognitive Status Impaired   Arousal/Participation Cooperative   Attention Difficulty attending to directions  (pt highly tangential and difficult to redirect to therapy task at times)   Orientation Level Oriented X4  (increased time for recall of date and place)   Memory Decreased recall of precautions;Decreased recall of recent events;Decreased short term memory   Following Commands Follows one step commands with increased time or repetition   Subjective   Subjective \"I fell on October 4th out here in the parking lot.\"   RUE Assessment   RUE Assessment WFL   LUE Assessment   LUE Assessment WFL   RLE Assessment   RLE Assessment X  (grossly 3/5 to 4-/5 MMT throughout)   LLE Assessment   LLE Assessment X  (grossly 3/5 to 4-/5 MMT throughout)   Coordination   Movements are Fluid and Coordinated 1   Sensation WFL   Bed Mobility   Supine to Sit 4  Minimal assistance   Additional items Assist x 1;HOB elevated;Bedrails;Increased time required;Verbal cues  (trunk managment)   Sit to Supine   (NT as pt was left seated OOB in recliner chair at end of session, alarm engaged)   Additional Comments Pt with significantly increased WOB with bed mobility, requiring increased time and PLB for recovery of SOB (SPO2 monitored and stable).   Transfers   Sit to Stand 4  Minimal assistance   Additional items Assist x 1;Increased time required;Verbal cues  (RW)   Stand to Sit 4  Minimal assistance   Additional items Assist x 1;Increased time required;Armrests;Verbal cues  (RW)   Stand pivot 4  Minimal assistance   Additional items Assist x 1;Increased time required;Verbal cues  (RW; cues " for RW management and surface proximity)   Ambulation/Elevation   Gait pattern Wide JANEL;Forward Flexion;Decreased foot clearance;Shuffling;Short stride;Decreased hip extension;Decreased heel strike   Gait Assistance 4  Minimal assist   Additional items Assist x 1;Verbal cues;Tactile cues   Assistive Device Rolling walker   Distance 5ft during ambulatory transition from EOB to recliner chair   Stair Management Assistance Not tested   Balance   Static Sitting Fair +   Dynamic Sitting Fair   Static Standing Poor +  (w/ Rw support)   Dynamic Standing Poor +  (w/ Rw support)   Ambulatory Poor +  (w/ Rw support)   Endurance Deficit   Endurance Deficit Yes   Activity Tolerance   Activity Tolerance Patient limited by fatigue;Other (Comment)  (SOB)   Medical Staff Made Aware Spoke with ANAY, OT   Nurse Made Aware Spoke with ADIA Villalba pre/post session   Assessment   Prognosis Fair   Problem List Decreased strength;Decreased range of motion;Decreased endurance;Impaired balance;Decreased mobility;Decreased cognition;Impaired judgement;Decreased safety awareness;Impaired hearing;Obesity;Decreased skin integrity   Assessment Pt seen for PT evaluation for mobility assessment & discharge needs. Activity orders: OOB to chair. Pt admitted 1/28/2024 w/ nausea, vomiting, weakness, wheezing, dx Right ureteral calculus. Comorbidities affecting pt's fnxl performance include: unknown as pt had not been to a doctor in >50yrs. During PT IE, pt requires RUMA for bed mobility in hospital bed, RUMA for STS transfers, and RUMA for ambulatory transition from EOB to recliner with RW. Pt severely limited due to weakness and respiratory impairment. Pt displays above outlined functional impairments & limitations, and presents significantly below baseline level of functional mobility. The AM-PAC & Barthel Index outcome tools were used to assist in determining pt safety w/ mobility/self care & appropriate d/c recommendations, see above for scores. Pt is  "at risk of falls d/t h/o falls, impaired balance, impaired insight/safety awareness, use of ambulatory aid, varying levels of pain , acuity of medical illness, ongoing medical treatment of primary dx, and unstable vitals. Pt's clinical presentation is currently unstable/unpredictable as seen in pt's presentation of vital sign response, varying levels of cognitive performance, increased fall risk, new onset of impairment of functional mobility, decreased endurance, and new onset of weakness. Pt will benefit from continued PT services in order to address impairments, decrease risk of falls, maximize independence w/ fnxl mobility, & ensure safety w/ mobility for transition to next level of care. Based on pt presentation & impairments, pt would most appropriately benefit from Level II (moderate PT intensity) resources upon d/c.   Barriers to Discharge Decreased caregiver support   Goals   Patient Goals \"to go home to her \"   STG Expiration Date 02/12/24   Short Term Goal #1 Patient PT goals established in order to maximize functional independence. Pt will: complete all bed mobility in hospital bed at LISA level in order to promote increased OOB functional mobility and simulate home environment; complete all transfers with RW at LISA level in order to increase safety with functional mobility; ambulate >100ft with RW at LISA level in order to increase safety with household and in facility distance functional mobility; improve B LE strength to >/= 4+/5 MMT t/o in order to increase safety with functional mobility and decrease risk of falls; demonstrate understanding and independence with LE strengthening HEP; improve ambulatory balance to >/= fair+ grade with RW in order to promote safety and increased independence with mobility; tolerate >3hrs OOB in upright position, in order to improve muscular endurance and respiratory status; improve AM-PAC score to >/= 19/24 in order to increase independence with mobility and " decrease burden of care; improve Barthel Index score to >/= 55/100 in order to increase independence and decrease risk of falls.   PT Treatment Day 0   Plan   Treatment/Interventions Functional transfer training;LE strengthening/ROM;Therapeutic exercise;Endurance training;Cognitive reorientation;Patient/family training;Equipment eval/education;Bed mobility;Gait training;Compensatory technique education;Spoke to nursing;Spoke to case management   PT Frequency 3-5x/wk   Discharge Recommendation   Rehab Resource Intensity Level, PT II (Moderate Resource Intensity)   AM-PAC Basic Mobility Inpatient   Turning in Flat Bed Without Bedrails 3   Lying on Back to Sitting on Edge of Flat Bed Without Bedrails 2   Moving Bed to Chair 3   Standing Up From Chair Using Arms 3   Walk in Room 2   Climb 3-5 Stairs With Railing 1   Basic Mobility Inpatient Raw Score 14   Basic Mobility Standardized Score 35.55   Highest Level Of Mobility   -[facility] Goal 4: Move to chair/commode   JH-HLM Achieved 4: Move to chair/commode   Modified Ashley Scale   Modified Ernie Scale 4   Barthel Index   Feeding 10   Bathing 0   Grooming Score 0   Dressing Score 5   Bladder Score 5   Bowels Score 10   Toilet Use Score 5   Transfers (Bed/Chair) Score 10   Mobility (Level Surface) Score 0   Stairs Score 0   Barthel Index Score 45   End of Consult   Patient Position at End of Consult Bedside chair;Bed/Chair alarm activated;All needs within reach  (RN aware of pt status)     This session, pt required and most appropriately benefited from partial or full skilled PT/OT co-eval due to significant regression from baseline level of mobility, continuous vitals monitoring, decreased activity tolerance, and unpredictable medical and/or functional status. PT and OT goals were addressed separately as seen in documentation.    Based on patient's [facility] Highest Level of Mobility scores today, patient currently has a goal of -[facility] Levels: 4: MOVE TO CHAIR/COMMODE,  to be completed with RN staffing each shift, in order to improve overall activity tolerance and mobility, combat hospital related deconditioning, and maximize outcomes for d/c from the acute care setting.     The patient's AM-PAC Basic Mobility Inpatient Short Form Raw Score is 14. A Raw score of less than or equal to 16 suggests the patient may benefit from discharge to post-acute rehabilitation services. Please also refer to the recommendation of the Physical Therapist for safe discharge planning.      Mackenzie Harper PT, DPT   Available via [employer] Reviewed  [facility] # [id number]  [place] License - [id number]  1/29/2024

## 2024-01-29 NOTE — ASSESSMENT & PLAN NOTE
Pt presented with tachypnea, increased WOB and audible expiratory wheezing, SpO2 88% on room air.  No known pulmonary history, briefly smoked cigarettes many years ago.  Spo2 improved on 2L NC but wheezes persist. Albutrol x1 given in ED with minimal improvement.   LS diminished with wheeze in upper airway, no stridor  CXR - no acute cardiopulmonary abnormalities.  CT chest - No acute findings, no consolidation, pneumothorax, pleural effusion  BNP - 2,737    Plan:  Continue supplemental oxygen for goal SpO2 > 92%, currently on 5L NC  Check ABG  Xopenex as needed for wheezing

## 2024-01-29 NOTE — CONSULTS
UROLOGY CONSULTATION NOTE     Patient Identifiers: Loida Duarte (MRN [id number])  Service Requesting Consultation: ER/ICU  Service Providing Consultation:  Urology, Barrera Vargas MD    Date of Service: 1/29/2024    Reason for Consultation: Right ureteral calculus with sepsis    History of Present Illness:     Loida Duarte is a 78 y.o. with a history of acute onset right-sided flank pain.  She was seen in the emergency room at [facility].  She has low-grade temperature of 100.7.  She is tachycardic and tachypneic.  Her oxygen saturation is 94% on nasal cannula.  Lactate is just below 4.  White blood cell count 27,000.  She denies any significant past medical or surgical history, however, she states she has not seen a doctor in perhaps 50 years.  CT scan shows a 6+ millimeter distal right ureteral calculus with right-sided hydronephrosis and marked stranding around the right kidney urgent consultation with urology has been requested for stent insertion for her sepsis/infected stone.    Past Medical, Past Surgical History:     History reviewed. No pertinent past medical history.  History reviewed. No pertinent surgical history.   Medications, Allergies:   Scheduled Meds:  Current Facility-Administered Medications   Medication Dose Route Frequency Provider Last Rate    cefepime  2,000 mg Intravenous Q24H MARLENE Ferguson      chlorhexidine  15 mL Mouth/Throat Q12H [facility] MARLENE Ferguson      ciprofloxacin  400 mg Intravenous Once Sonya Brady PA-C      sodium chloride  500 mL Intravenous Once Fausto Marco A,  mL (01/28/24 2319)    vancomycin  25 mg/kg (Adjusted) Intravenous Once Fausto Marco A, DO 1,750 mg (01/28/24 2352)    vancomycin  15 mg/kg Intravenous Q12H MARLENE Ferguson       Continuous Infusions:   PRN Meds:.    Allergies:  Allergies   Allergen Reactions    Penicillins Swelling   :    Social and Family History:   Social History:   Social History     Tobacco Use     Smoking status: Former     Types: Cigarettes    Smokeless tobacco: Never   Substance Use Topics    Alcohol use: Never    Drug use: Never   .    Social History     Tobacco Use   Smoking Status Former    Types: Cigarettes   Smokeless Tobacco Never       Family History:  History reviewed. No pertinent family history.:     Review of Systems:   She reports right flank pain.  She reports chest discomfort with increased work of breathing.  All other systems queried were negative.    Physical Exam:     Vitals:    01/28/24 2345   BP: 97/55   Pulse: 102   Resp:    Temp:    SpO2: 95%       PE:  General: Awake and alert but ill and toxic appearing  HEENT: Normocephalic/atraumatic, sclera anicteric  Cardiac: Tachycardic  Respiratory: Increased work of breathing with expiratory wheezes  Abdomen: Soft, morbidly obese, nontender nondistended  Skin: Warm  Extremities: With edema  Neurologic: Grossly intact and nonfocal  Affect: Pleasant and conversant despite her overall appearance      Labs:     Lab Results   Component Value Date    HGB 13.7 01/28/2024    HCT 42.3 01/28/2024    WBC 27.34 (H) 01/28/2024     (L) 01/28/2024   ]    Lab Results   Component Value Date    K 3.7 01/28/2024    CL 99 01/28/2024    CO2 23 01/28/2024    BUN 34 (H) 01/28/2024    CREATININE 2.58 (H) 01/28/2024    CALCIUM 8.8 01/28/2024   ]    Imaging:   CT scan reviewed shows a distal right ureteral calculus approximately 6 to 7 mm at the level of the right UVJ with right-sided hydronephrosis and stranding around the right kidney    ASSESSMENT:     78 y.o. old female with low-grade fever, sepsis, lactic acidosis, leukocytosis, and hydronephrosis all secondary to a 6-7 mm distal right ureteral calculus    PLAN:     I recommend urgent cystoscopy with right retrograde pyelography and right ureteral stent insertion in the operating room.  Risk of the procedure including, but not limited to bleeding, infection, sepsis, ureteral injury, need for additional  surgery, anesthetic complication up to and including myocardial infarction, DVT, PE, and even death secondary to her overall clinical condition at this time.  She understands that I will not be removing her stone at this time and that she will require second stage ureteroscopy in the future once her infection clears.  The patient is clearly able to give informed consent and wishes to proceed with the procedure.    Thank you for allowing me to participate in this patients’ care.  Please do not hesitate to call with any additional questions.  Barrera Vargas MD

## 2024-01-29 NOTE — DISCHARGE INSTR - AVS FIRST PAGE
Your stone was not addressed during today's surgery and you will absolutely need to return for interval stone surgery. This next procedure will be coordinated by our office. Please be aware that failure to followup can result in dangerous complication as the ureteral stent cannot remain in place indefinitely.    After your surgery today, a stent was left coiled in your kidney/ureter/bladder. This tube will help the area to safely heal and urine to drain unobstructed.     The stent placed is not permanent and cannot be left in your body indefinitely it will be exchanged at the time of your next surgery and then removed after.     It is common to have some mild symptoms while the stent is in place. Pain with urination, feeling of needing to urinate frequency or urgently, flank discomfort or blood in the urine. Utilize the medications provided (flomax, ditropan, pyridium) as well as ibuprofen/motrin for pain control. Hydrate liberally with oral fluids.    If there are issues at home, our office should be notified at [phone].      CBC with differential and BMP 2/14/2024    Remove PICC line after last dose of IV antibiotics on 2/16/2024    Dear Loida Duarte,     It was our pleasure to care for you here at [facility].  It is our hope that we were always able to exceed the expected standards for your care during your stay.  You were hospitalized due to black vomitus, sepsis.  You were cared for on the second and fourth floors by John Omer MD under the service of Frank Dockery* with the [facility] who covers for your primary care physician (PCP), No primary care provider on file., while you were hospitalized.  If you have any questions or concerns related to this hospitalization, you may contact us at .  For follow up as well as any medication refills, we recommend that you follow up with your primary care physician.  A  registered nurse will reach out to you by phone within a few days after your discharge to answer any additional questions that you may have after going home.  However, at this time we provide for you here, the most important instructions / recommendations at discharge:     Notable Medication Adjustments -   You have been started on acetaminophen (Tylenol) 325 tablet, take 3 tablets by mouth every 8 hours as needed for mild pain or fever  Please note that you have been started on albuterol (Proventil HFA, Ventolin HFA) 90 mcg inhaler, inhale 2 puffs every 4 hours as needed for wheezing or shortness of breath  Please note that you have been started on amlodipine (Norvasc) 10 mg tablet, take this once per day  Please note you have been started on ampicillin 2 g in sodium chloride every 6 hours for the next 8 days through 2/16/2024, please start taking this on 2/8/2024 at 1600 (4 PM).  Please note that you have been started on aspirin 81 mg tablets please take this once per day.  Please note that you have been started on atorvastatin (Lipitor) 80 mg tablet please take this once in the evenings with dinner.  Please note that you have been started on calcium carb-cholecalciferol (calcium carbonate-vitamin D) 500 mg - 500 mcg tablet, please take this once per day with breakfast  Please note that you have been started on carvedilol (Coreg) 3.125 mg tablet please take 1 tablet 2 times a day with meals  Please note that you have been started on clopidogrel (Plavix) 75 mg tablet, please take this once per day  Please note that you have been started on melatonin 3 mg, you may take 2 at night daily before bedtime  Please note that you have been started on nystatin (Mycostatin) powder, you may apply this 2 times a day to affected fungal areas.  Please note you have been started on pantoprazole (Protonix) 40 mg tablet, please take this 2 times a day with meals.  Testing Required after Discharge -   Please note that you will need  to have a CBC with differential and BMP on 2/14/2024, please be sure to establish care with a primary care physician to have these done.  You will need a repeat EGD in 3 months, this is an endoscopic procedure performed by gastroenterology who you will be following with after this admission.  ** Please contact your PCP to request testing orders for any of the testing recommended here **  Important follow up information -   Please establish care with a primary care physician  Please be sure to follow-up with gastroenterology, you have been provided referral to do so.  You will need to be seen for a follow-up EGD/endoscopy  Please be sure to establish care with a cardiologist, you have an upcoming appointment on 2/29/2024 at 1 PM with [facility], phone number [phone]  Please be sure to establish care with a urologist, you have been provided with a referral to do so.  You will need to do so to follow-up for your placed stent.  Please be sure to establish care with a pulmonologist, you have been provided with a referral to do so.  You will need to do so to follow-up on your suspected COPD/asthma.  Other Instructions -   Please be sure to take all of your medications as prescribed and attend all scheduled appointments.  Please be sure to use the provided referrals to establish care with specialists.  If you have recurrence of your symptoms and they cannot be managed at home, please be sure to present to the emergency department for repeat evaluation.  Please be sure to have your PICC line removed after your last dose of antibiotics on the night of 2/16/2024.  Please review this entire after visit summary as additional general instructions including medication list, appointments, activity, diet, any pertinent wound care, and other additional recommendations from your care team that may be provided for you.      Sincerely,     John Omer MD

## 2024-01-29 NOTE — PLAN OF CARE
Problem: OCCUPATIONAL THERAPY ADULT  Goal: Performs self-care activities at highest level of function for planned discharge setting.  See evaluation for individualized goals.  Description: Treatment Interventions: ADL retraining, Functional transfer training, Endurance training, Cognitive reorientation, Patient/family training, Equipment evaluation/education, Compensatory technique education, Energy conservation, Activityengagement          See flowsheet documentation for full assessment, interventions and recommendations.   Note: Limitation: Decreased ADL status, Decreased UE strength, Decreased Safe judgement during ADL, Decreased cognition, Decreased endurance, Decreased self-care trans, Decreased high-level ADLs (impaired pain, balance, fxnl mobility, act kellee, fxnl reach, trunk control, standing kellee, strength, fxnl sitting balance, fxnl sitting kellee, attention to task, direction following, safety awareness, insight, problem solving, learning new tasks)  Prognosis: Good  Assessment: Pt is a 78 y.o. female seen for OT evaluation s/p admission to [facility] on 1/28/2024 due to wheezing and nausea /vomiting. Diagnosed with Right ureteral calculus. Personal and env factors supporting pt at time of IE include (I) PLOF, accessible home environment, and FFSU. Personal and env factors inhibiting engagement in occupations include advanced age, limited social support, and current role of caregiver for  . Performance deficits that affect the pt’s occupational performance can be seen above. Due to pt's current functional limitations and medical complications pt is functioning below baseline. Pt would benefit from continued skilled OT treatment in order to maximize safety, independence and overall performance with ADLs, functional mobility, functional transfers, and cognition in order to achieve highest level of function.     Rehab Resource Intensity Level, OT: II (Moderate Resource Intensity)

## 2024-01-29 NOTE — ANESTHESIA PREPROCEDURE EVALUATION
Procedure:  CYSTOSCOPY RETROGRADE PYELOGRAM WITH INSERTION STENT URETERAL (Right: Bladder)    Relevant Problems   No relevant active problems      BMI 51. Patient states she has not been to a medical doctor in 50 years. Patient with cardiomegaly on CT exam as well as obstructing right kidney stone. LA 3.8. SpO2 88% on presentation to ED with audible wheezing. 94% on NC Received 500 cc fluids in ED. Patient Last ate on Saturday. After discussion with Dr. Patel  and patient, patient to receive minimal sedation considering respiratory status, unknown cardiovascular status, and distal location of kidney stone. Repeat LA 2.0.     Latest Reference Range & Units 01/28/24 21:26   Sodium 135 - 147 mmol/L 135   Potassium 3.5 - 5.3 mmol/L 3.7   Chloride 96 - 108 mmol/L 99   Carbon Dioxide 21 - 32 mmol/L 23   ANION GAP mmol/L 13   BUN 5 - 25 mg/dL 34 (H)   Creatinine 0.60 - 1.30 mg/dL 2.58 (H)   GLUCOSE 65 - 140 mg/dL 84   Calcium 8.4 - 10.2 mg/dL 8.8   AST 13 - 39 U/L 46 (H)   ALT 7 - 52 U/L 11   ALK PHOS 34 - 104 U/L 69   Total Protein 6.4 - 8.4 g/dL 7.0   Albumin 3.5 - 5.0 g/dL 3.6   Total Bilirubin 0.20 - 1.00 mg/dL 0.71   GFR, Calculated ml/min/1.73sq m 16   LACTIC ACID 0.5 - 2.0 mmol/L 3.8 (HH)   Procalcitonin <=0.25 ng/ml 230.37 (H)   (HH): Data is critically high  (H): Data is abnormally high    Physical Exam    Airway       Dental       Cardiovascular      Pulmonary      Other Findings  post-pubertal.      Anesthesia Plan  ASA Score- 5 Emergent    Anesthesia Type- IV sedation with anesthesia with ASA Monitors.         Additional Monitors:     Airway Plan:            Plan Factors-Exercise tolerance (METS): <4 METS.    Chart reviewed. EKG reviewed. Imaging results reviewed. Existing labs reviewed. Patient summary reviewed.    Patient is not a current smoker.  Patient did not smoke on day of surgery.            Induction- intravenous.    Postoperative Plan-     Informed Consent- Anesthetic plan and risks discussed with  patient.  I personally reviewed this patient with the CRNA. Discussed and agreed on the Anesthesia Plan with the CRNA..

## 2024-01-29 NOTE — CONSULTS
"Consultation - Cardiology Team One  Loida Duarte 78 y.o. female MRN: [id number]  Unit/Bed#: ICU 01 Encounter: [id number]    Inpatient consult to Cardiology  Consult performed by: MARLENE aMta  Consult ordered by: MARLENE Ferguson      Physician Requesting Consult: Phylicia Meade DO  Reason for Consult / Principal Problem: Elevated troponin, SOB    Assessment    Elevated troponin- likely type 2 MI  No c/o chest pain on admission. Admits to random episodes \"when my  stresses me out\"  Hs troponin trend: 14,797-->11,339-->12,709  ECG- sinus tachycardia with RBBB  TTE pending  CT notes coronary artery calcifications  Started on IV Heparin, ASA, statin, and Plavix  There was some concern for coffee ground emesis PTA but she also was eating black licorice which is what she thinks caused it. No evidence of acute bleeding in hospital.    Acute hypoxic respiratory failure  BNP 2737. CXR read pending  Diffuse wheezing on exam but no edema  Currently requiring 5LNC  Diuretics held 2/2 active sepsis  TTE pending    Urosepsis secondary to right ureteral calculus  Final urine culture pending  Blood cx growing gram negative rods and gram + cocci  On IV antibiotics per primary team    RAKEL- creatinine 2.8. unknown baseline. Suspect 2/2 severe sepsis and obstructing renal calculus    Dyslipidemia- , , HDL 37, . Started on atorvastatin 80 mg daily    Morbid obesity- BMI 51.92    Plan  Hs troponin elevation secondary to severe sepsis.   Continue IV heparin, ASA, Plavix, and statin  Cardiac cath prior to d/c- timing to be determined pending improvement from sepsis.   Follow up echocardiogram results  Strict I/Os, daily weights, am BMP    History of Present Illness   HPI: Loida Duarte is a 78 y.o. year old female with no reported past medical history but has not seen a medical provider in 50 years who presented to the ED with c/o weakness, nausea, and vomiting that began two days prior to " admission. She thought she had food poisoning. She was febrile on admission and tachycardic with WBC count up to 30K.  Work up revealed a right UVJ calculus with an RAKEL. She underwent cystoscopy with stent placement early in the morning of 24. Procalcitonin 230 with blood cultures are growing gram negative rods and gram positive cocci. Final urine culture is pending. She is on IV antibiotics. She has overt wheezing on exam and reports mild shortness of breath. Troponin was elevated to 14,000 on admission and subsequently trended down. BNP also elevated, 2737. Diuretics were not started due to active sepsis. She was placed on IV heparin 2/2 elevated troponin and cardiology consulted for further evaluation and management of her elevated troponin.    She lives at [facility] with her bed bound . She is a lifelong nonsmoker and denies any drug or alcohol use. She ambulates with a walker. She worked at a mill making uniforms prior to retiring. She raised 5 children and also one of her grandchildren. Her father  of suspected rheumatic heart disease in his mid 50s. He was also a smoker. Her mother  of heart problems at age 64- unclear exactly what the problems were.     EKG reviewed personally: Sinus tachycardia, LVH with RBBB    Telemetry reviewed personally: NSR and sinus tachycardia with RBBB    Review of Systems   Constitutional: Positive for fever and malaise/fatigue. Negative for chills, diaphoresis and weight gain.   Cardiovascular:  Negative for chest pain, dyspnea on exertion, leg swelling, orthopnea, palpitations and syncope.   Respiratory:  Positive for wheezing. Negative for cough, shortness of breath, sleep disturbances due to breathing and sputum production.    Endocrine: Negative.    Gastrointestinal:  Positive for nausea and vomiting. Negative for bloating and abdominal pain.   Neurological:  Positive for weakness. Negative for dizziness and light-headedness.   Psychiatric/Behavioral:   Negative for altered mental status.    All other systems reviewed and are negative.    Historical Information   History reviewed. No pertinent past medical history.  History reviewed. No pertinent surgical history.  Social History     Substance and Sexual Activity   Alcohol Use Never     Social History     Substance and Sexual Activity   Drug Use Never     Social History     Tobacco Use   Smoking Status Former    Types: Cigarettes   Smokeless Tobacco Never     Family History: History reviewed. No pertinent family history.    Meds/Allergies   all current active meds have been reviewed and current meds:   Current Facility-Administered Medications   Medication Dose Route Frequency    acetaminophen (TYLENOL) tablet 650 mg  650 mg Oral Q6H PRN    [START ON 1/30/2024] aspirin chewable tablet 81 mg  81 mg Oral Daily    atorvastatin (LIPITOR) tablet 80 mg  80 mg Oral QPM    cefepime (MAXIPIME) 1,000 mg in dextrose 5 % 50 mL IVPB  1,000 mg Intravenous Q12H    chlorhexidine (PERIDEX) 0.12 % oral rinse 15 mL  15 mL Mouth/Throat Q12H GUNJAN    clopidogrel (PLAVIX) tablet 75 mg  75 mg Oral Daily    heparin (porcine) 25,000 units in 0.45% NaCl 250 mL infusion (premix)  3-20 Units/kg/hr (Order-Specific) Intravenous Titrated    heparin (porcine) injection 2,000 Units  2,000 Units Intravenous Q6H PRN    heparin (porcine) injection 4,000 Units  4,000 Units Intravenous Q6H PRN    levalbuterol (XOPENEX) inhalation solution 1.25 mg  1.25 mg Nebulization Q6H PRN    trimethobenzamide (TIGAN) IM injection 200 mg  200 mg Intramuscular Q6H PRN    vancomycin (VANCOCIN) IVPB (premix in dextrose) 1,000 mg 200 mL  15 mg/kg (Adjusted) Intravenous Daily PRN     heparin (porcine), 3-20 Units/kg/hr (Order-Specific), Last Rate: 11.1 Units/kg/hr (01/29/24 2067)        Allergies   Allergen Reactions    Penicillins Swelling       Objective   Vitals: Blood pressure 122/56, pulse 104, temperature 98.9 °F (37.2 °C), temperature source Oral, resp. rate 22,  "height 4' 9\" (1.448 m), weight 109 kg (240 lb 15.4 oz), SpO2 93%., Body mass index is 52.14 kg/m².,     Systolic (24hrs), Av , Min:97 , Max:140     Diastolic (24hrs), Av, Min:53, Max:73        Intake/Output Summary (Last 24 hours) at 2024 0853  Last data filed at 2024 0600  Gross per 24 hour   Intake 430 ml   Output 200 ml   Net 230 ml     Wt Readings from Last 3 Encounters:   24 109 kg (240 lb 15.4 oz)     Invasive Devices       Peripheral Intravenous Line  Duration             Peripheral IV 24 Left Antecubital <1 day    Peripheral IV 24 Proximal;Right;Ventral (anterior) Forearm <1 day              Drain  Duration             Urethral Catheter Latex;Double-lumen 18 Fr. <1 day                    Physical Exam  Vitals reviewed.   Constitutional:       General: She is not in acute distress.     Appearance: She is obese. She is ill-appearing.   Neck:      Vascular: No hepatojugular reflux or JVD.   Cardiovascular:      Rate and Rhythm: Regular rhythm. Tachycardia present.      Heart sounds: Normal heart sounds. No murmur heard.     No friction rub. No gallop.   Pulmonary:      Effort: Tachypnea present.      Breath sounds: Wheezing present. No rales.      Comments: Scattered crackles throughout  Abdominal:      General: Bowel sounds are normal. There is no distension.      Palpations: Abdomen is soft.      Tenderness: There is no abdominal tenderness.   Musculoskeletal:         General: No tenderness. Normal range of motion.      Cervical back: Neck supple.      Right lower leg: No edema.      Left lower leg: No edema.   Skin:     General: Skin is warm and dry.      Findings: No erythema.   Neurological:      Mental Status: She is alert and oriented to person, place, and time.   Psychiatric:         Mood and Affect: Mood normal.       LABORATORY RESULTS:      CBC with diff:   Results from last 7 days   Lab Units 24  0543 24  0210 24  2126   WBC Thousand/uL 25.47* " "30.32* 27.34*   HEMOGLOBIN g/dL 12.8 12.6 13.7   HEMATOCRIT % 39.1 39.2 42.3   MCV fL 99* 101* 98   PLATELETS Thousands/uL 121* 133* 148*   RBC Million/uL 3.94 3.90 4.32   MCH pg 32.5 32.3 31.7   MCHC g/dL 32.7 32.1 32.4   RDW % 14.2 14.3 13.9   MPV fL 12.1 11.7 11.4   NRBC AUTO /100 WBCs 0  --   --        CMP:  Results from last 7 days   Lab Units 01/29/24  0543 01/29/24  0210 01/28/24 2126   POTASSIUM mmol/L 4.6 3.9 3.7   CHLORIDE mmol/L 100 100 99   CO2 mmol/L 23 23 23   BUN mg/dL 40* 37* 34*   CREATININE mg/dL 2.80* 2.75* 2.58*   CALCIUM mg/dL 7.9* 7.9* 8.8   AST U/L 64*  --  46*   ALT U/L 16  --  11   ALK PHOS U/L 60  --  69   EGFR ml/min/1.73sq m 15 15 16       BMP:  Results from last 7 days   Lab Units 01/29/24  0543 01/29/24 0210 01/28/24 2126   POTASSIUM mmol/L 4.6 3.9 3.7   CHLORIDE mmol/L 100 100 99   CO2 mmol/L 23 23 23   BUN mg/dL 40* 37* 34*   CREATININE mg/dL 2.80* 2.75* 2.58*   CALCIUM mg/dL 7.9* 7.9* 8.8       Lab Results   Component Value Date    CREATININE 2.80 (H) 01/29/2024    CREATININE 2.75 (H) 01/29/2024    CREATININE 2.58 (H) 01/28/2024      Results from last 7 days   Lab Units 01/29/24  0543 01/29/24 0210   MAGNESIUM mg/dL 2.2 1.5*     Results from last 7 days   Lab Units 01/29/24  0748 01/28/24 2126   INR  1.43* 1.22*     Lipid Profile:   No results found for: \"CHOL\"  Lab Results   Component Value Date    HDL 37 (L) 01/29/2024     Lab Results   Component Value Date    LDLCALC 123 (H) 01/29/2024     Lab Results   Component Value Date    TRIG 223 (H) 01/29/2024     Imaging: I have personally reviewed pertinent reports.   and I have personally reviewed pertinent films in PACS  CT chest abdomen pelvis wo contrast    Result Date: 1/29/2024  Narrative: CT CHEST, ABDOMEN AND PELVIS WITHOUT IV CONTRAST INDICATION: r/o pulm edema, eval focal PNA. COMPARISON: None. TECHNIQUE: CT examination of the chest, abdomen and pelvis was performed without intravenous contrast. Multiplanar 2D reformatted " images were created from the source data. This examination, like all CT scans performed in the [facility], was performed utilizing techniques to minimize radiation dose exposure, including the use of iterative reconstruction and automated exposure control. Radiation dose length product (DLP) for this visit: 1238 mGy-cm Enteric Contrast: Not administered. FINDINGS: CHEST LUNGS: Lungs are clear. No tracheal or endobronchial lesion. PLEURA: Trace right pleural effusion. HEART/GREAT VESSELS: Moderate cardiomegaly. Coronary and valvular calcifications. No thoracic aortic aneurysm. MEDIASTINUM AND ROSARIO: Unremarkable. CHEST WALL AND LOWER NECK: Unremarkable. ABDOMEN LIVER/BILIARY TREE: Unremarkable. GALLBLADDER: Distended, with multiple gallstones. SPLEEN: Unremarkable. PANCREAS: Unremarkable. ADRENAL GLANDS: Unremarkable. KIDNEYS/URETERS: 6 mm right ureterovesicular junction calculus. Moderate right hydroureteronephrosis with right perinephric and right periureteral stranding.  2.7 cm simple cyst in the lower pole of the left kidney. STOMACH AND BOWEL: No evidence of bowel obstruction or gross inflammatory process. Colonic diverticulosis without evidence of diverticulitis. APPENDIX: No findings to suggest appendicitis. ABDOMINOPELVIC CAVITY: No ascites. No pneumoperitoneum. No lymphadenopathy. VESSELS: Atherosclerosis without abdominal aortic aneurysm. PELVIS REPRODUCTIVE ORGANS: Unremarkable for patient's age. URINARY BLADDER: Unremarkable. ABDOMINAL WALL/INGUINAL REGIONS: Unremarkable. BONES: No acute fracture or suspicious osseous lesion. Spinal degenerative changes.     Impression: Trace right pleural fluid. Moderate cardiomegaly. 6 mm right UVJ calculus. Moderate right hydroureteronephrosis. Perinephric and periureteral stranding. Cholelithiasis. No radiographic evidence of cholecystitis. Colonic diverticulosis. Other findings, as per the body of the report. Preliminary report from [facility] was provided  on 1/28/2024 at 11:35 p.m. Workstation performed: [id number]     Counseling / Coordination of Care  Total floor / unit time spent today 45 minutes.  Greater than 50% of total time was spent with the patient and / or family counseling and / or coordination of care.  A description of the counseling / coordination of care: Review of history, current assessment, development of a plan.    Code Status: Level 3 - DNAR and DNI    ** Please Note: Dragon 360 Dictation voice to text software may have been used in the creation of this document. **

## 2024-01-29 NOTE — PLAN OF CARE
Problem: PHYSICAL THERAPY ADULT  Goal: Performs mobility at highest level of function for planned discharge setting.  See evaluation for individualized goals.  Description: Treatment/Interventions: Functional transfer training, LE strengthening/ROM, Therapeutic exercise, Endurance training, Cognitive reorientation, Patient/family training, Equipment eval/education, Bed mobility, Gait training, Compensatory technique education, Spoke to nursing, Spoke to case management    See flowsheet documentation for full assessment, interventions and recommendations.  Note: Prognosis: Fair  Problem List: Decreased strength, Decreased range of motion, Decreased endurance, Impaired balance, Decreased mobility, Decreased cognition, Impaired judgement, Decreased safety awareness, Impaired hearing, Obesity, Decreased skin integrity  Assessment: Pt seen for PT evaluation for mobility assessment & discharge needs. Activity orders: OOB to chair. Pt admitted 1/28/2024 w/ nausea, vomiting, weakness, wheezing, dx Right ureteral calculus. Comorbidities affecting pt's fnxl performance include: unknown as pt had not been to a doctor in >50yrs. During PT IE, pt requires RUMA for bed mobility in hospital bed, RUMA for STS transfers, and RUMA for ambulatory transition from EOB to recliner with RW. Pt severely limited due to weakness and respiratory impairment. Pt displays above outlined functional impairments & limitations, and presents significantly below baseline level of functional mobility. The AM-PAC & Barthel Index outcome tools were used to assist in determining pt safety w/ mobility/self care & appropriate d/c recommendations, see above for scores. Pt is at risk of falls d/t h/o falls, impaired balance, impaired insight/safety awareness, use of ambulatory aid, varying levels of pain , acuity of medical illness, ongoing medical treatment of primary dx, and unstable vitals. Pt's clinical presentation is currently unstable/unpredictable  as seen in pt's presentation of vital sign response, varying levels of cognitive performance, increased fall risk, new onset of impairment of functional mobility, decreased endurance, and new onset of weakness. Pt will benefit from continued PT services in order to address impairments, decrease risk of falls, maximize independence w/ fnxl mobility, & ensure safety w/ mobility for transition to next level of care. Based on pt presentation & impairments, pt would most appropriately benefit from Level II (moderate PT intensity) resources upon d/c.    Barriers to Discharge: Decreased caregiver support     Rehab Resource Intensity Level, PT: II (Moderate Resource Intensity)    See flowsheet documentation for full assessment.

## 2024-01-29 NOTE — ASSESSMENT & PLAN NOTE
CT chest/ABD/pelvis - CT chest/ABD/pelvis - 6mm calculus in the right ureterovesical junction and moderate hydronephrosis.  Urology consulted in ED and patient taken to OR for cysto with stent placement  Given tenuous respiratory status, pt was not intubated, given light sedation, tolerated well.  Continue ABX, see plan above  Hold IVF for now  Maintain navas catheter  Monitor I&O  Monitor renal function

## 2024-01-29 NOTE — CASE MANAGEMENT
Case Management Discharge Planning Note    Patient name Loida Duarte  Location ICU ICU  MRN [id number]  : 1945 Date 2024       Current Admission Date: 2024  Current Admission Diagnosis:Right ureteral calculus   Patient Active Problem List    Diagnosis Date Noted    Right ureteral calculus 2024    Severe sepsis (HCC) 2024    Acute respiratory insufficiency 2024    RAKEL (acute kidney injury) (HCC) 2024    Elevated troponin 2024      LOS (days): 0  Geometric Mean LOS (GMLOS) (days):   Days to GMLOS:     OBJECTIVE:  Risk of Unplanned Readmission Score: 15.27         Current admission status: Inpatient   Preferred Pharmacy: No Pharmacies Listed  Primary Care Provider: No primary care provider on file.    Primary Insurance:   Secondary Insurance:     DISCHARGE DETAILS:    Discharge planning discussed with:: pt and her sonMissy Jr  Freedom of Choice: Yes  Comments - Freedom of Choice: [facility] referrals    Contacts  Patient Contacts: SonMissy Jr  Relationship to Patient:: Family  Contact Method: Phone  Reason/Outcome: Continuity of Care, Emergency Contact, Referral, Discharge Planning    Other Referral/Resources/Interventions Provided:  Interventions: Short Term Rehab  Referral Comments: PT/OT recs for STR at this time. CM met with pt at bedside and her sonMissy Jr on the phone. CM reviewed therapy recs for STR. Pt is reluctant, but agreeable to referrals. Son supports pt going to rehab. Missy Cervantes reports they are working on getting pt's  24/7 care at home while pt is in the hospital and going to rehab. CM did review that if pt improves with therapy while at the hospital can set up pt to return home. Referrals in [place].

## 2024-01-30 ENCOUNTER — ANESTHESIA EVENT (OUTPATIENT)
Dept: ANESTHESIOLOGY | Facility: HOSPITAL | Age: 79
End: 2024-01-30

## 2024-01-30 ENCOUNTER — ANESTHESIA (OUTPATIENT)
Dept: ANESTHESIOLOGY | Facility: HOSPITAL | Age: 79
End: 2024-01-30

## 2024-01-30 LAB
ANION GAP SERPL CALCULATED.3IONS-SCNC: 10 MMOL/L
ANION GAP SERPL CALCULATED.3IONS-SCNC: 11 MMOL/L
APTT PPP: 100 SECONDS (ref 23–37)
APTT PPP: 53 SECONDS (ref 23–37)
APTT PPP: 63 SECONDS (ref 23–37)
APTT PPP: 95 SECONDS (ref 23–37)
BASOPHILS # BLD MANUAL: 0 THOUSAND/UL (ref 0–0.1)
BASOPHILS NFR MAR MANUAL: 0 % (ref 0–1)
BUN SERPL-MCNC: 54 MG/DL (ref 5–25)
BUN SERPL-MCNC: 57 MG/DL (ref 5–25)
CA-I BLD-SCNC: 1.1 MMOL/L (ref 1.12–1.32)
CALCIUM SERPL-MCNC: 8.6 MG/DL (ref 8.4–10.2)
CALCIUM SERPL-MCNC: 8.8 MG/DL (ref 8.4–10.2)
CHLORIDE SERPL-SCNC: 101 MMOL/L (ref 96–108)
CHLORIDE SERPL-SCNC: 101 MMOL/L (ref 96–108)
CO2 SERPL-SCNC: 20 MMOL/L (ref 21–32)
CO2 SERPL-SCNC: 22 MMOL/L (ref 21–32)
CREAT SERPL-MCNC: 2.49 MG/DL (ref 0.6–1.3)
CREAT SERPL-MCNC: 2.89 MG/DL (ref 0.6–1.3)
EOSINOPHIL # BLD MANUAL: 0 THOUSAND/UL (ref 0–0.4)
EOSINOPHIL NFR BLD MANUAL: 0 % (ref 0–6)
ERYTHROCYTE [DISTWIDTH] IN BLOOD BY AUTOMATED COUNT: 14.5 % (ref 11.6–15.1)
GFR SERPL CREATININE-BSD FRML MDRD: 14 ML/MIN/1.73SQ M
GFR SERPL CREATININE-BSD FRML MDRD: 17 ML/MIN/1.73SQ M
GLUCOSE SERPL-MCNC: 106 MG/DL (ref 65–140)
GLUCOSE SERPL-MCNC: 123 MG/DL (ref 65–140)
HCT VFR BLD AUTO: 41 % (ref 34.8–46.1)
HGB BLD-MCNC: 13.5 G/DL (ref 11.5–15.4)
LYMPHOCYTES # BLD AUTO: 0.91 THOUSAND/UL (ref 0.6–4.47)
LYMPHOCYTES # BLD AUTO: 3 % (ref 14–44)
MAGNESIUM SERPL-MCNC: 2.3 MG/DL (ref 1.9–2.7)
MCH RBC QN AUTO: 32.3 PG (ref 26.8–34.3)
MCHC RBC AUTO-ENTMCNC: 32.9 G/DL (ref 31.4–37.4)
MCV RBC AUTO: 98 FL (ref 82–98)
METAMYELOCYTES NFR BLD MANUAL: 3 % (ref 0–1)
MONOCYTES # BLD AUTO: 0.61 THOUSAND/UL (ref 0–1.22)
MONOCYTES NFR BLD: 2 % (ref 4–12)
MRSA NOSE QL CULT: NORMAL
MYELOCYTES NFR BLD MANUAL: 1 % (ref 0–1)
NEUTROPHILS # BLD MANUAL: 27.62 THOUSAND/UL (ref 1.85–7.62)
NEUTS BAND NFR BLD MANUAL: 22 % (ref 0–8)
NEUTS SEG NFR BLD AUTO: 69 % (ref 43–75)
PHOSPHATE SERPL-MCNC: 3.9 MG/DL (ref 2.3–4.1)
PLATELET # BLD AUTO: 103 THOUSANDS/UL (ref 149–390)
PLATELET BLD QL SMEAR: ABNORMAL
PMV BLD AUTO: 12.2 FL (ref 8.9–12.7)
POTASSIUM SERPL-SCNC: 4 MMOL/L (ref 3.5–5.3)
POTASSIUM SERPL-SCNC: 4.6 MMOL/L (ref 3.5–5.3)
RBC # BLD AUTO: 4.18 MILLION/UL (ref 3.81–5.12)
RBC MORPH BLD: NORMAL
SODIUM SERPL-SCNC: 132 MMOL/L (ref 135–147)
SODIUM SERPL-SCNC: 133 MMOL/L (ref 135–147)
VANCOMYCIN SERPL-MCNC: 12.6 UG/ML (ref 10–20)
WBC # BLD AUTO: 30.35 THOUSAND/UL (ref 4.31–10.16)

## 2024-01-30 PROCEDURE — 85027 COMPLETE CBC AUTOMATED: CPT

## 2024-01-30 PROCEDURE — 99233 SBSQ HOSP IP/OBS HIGH 50: CPT | Performed by: INTERNAL MEDICINE

## 2024-01-30 PROCEDURE — 94664 DEMO&/EVAL PT USE INHALER: CPT

## 2024-01-30 PROCEDURE — 84100 ASSAY OF PHOSPHORUS: CPT

## 2024-01-30 PROCEDURE — 94760 N-INVAS EAR/PLS OXIMETRY 1: CPT

## 2024-01-30 PROCEDURE — 80048 BASIC METABOLIC PNL TOTAL CA: CPT

## 2024-01-30 PROCEDURE — 80202 ASSAY OF VANCOMYCIN: CPT | Performed by: UROLOGY

## 2024-01-30 PROCEDURE — 85007 BL SMEAR W/DIFF WBC COUNT: CPT

## 2024-01-30 PROCEDURE — 87077 CULTURE AEROBIC IDENTIFY: CPT

## 2024-01-30 PROCEDURE — 87147 CULTURE TYPE IMMUNOLOGIC: CPT

## 2024-01-30 PROCEDURE — 87040 BLOOD CULTURE FOR BACTERIA: CPT

## 2024-01-30 PROCEDURE — 83735 ASSAY OF MAGNESIUM: CPT

## 2024-01-30 PROCEDURE — 85730 THROMBOPLASTIN TIME PARTIAL: CPT | Performed by: NURSE PRACTITIONER

## 2024-01-30 PROCEDURE — 82330 ASSAY OF CALCIUM: CPT

## 2024-01-30 PROCEDURE — 99232 SBSQ HOSP IP/OBS MODERATE 35: CPT | Performed by: INTERNAL MEDICINE

## 2024-01-30 PROCEDURE — 94640 AIRWAY INHALATION TREATMENT: CPT

## 2024-01-30 PROCEDURE — 85730 THROMBOPLASTIN TIME PARTIAL: CPT | Performed by: INTERNAL MEDICINE

## 2024-01-30 RX ORDER — LEVALBUTEROL INHALATION SOLUTION 1.25 MG/3ML
1.25 SOLUTION RESPIRATORY (INHALATION)
Status: DISCONTINUED | OUTPATIENT
Start: 2024-01-30 | End: 2024-02-02

## 2024-01-30 RX ORDER — LEVALBUTEROL INHALATION SOLUTION 1.25 MG/3ML
1.25 SOLUTION RESPIRATORY (INHALATION) 3 TIMES DAILY
Status: DISCONTINUED | OUTPATIENT
Start: 2024-01-30 | End: 2024-01-30

## 2024-01-30 RX ORDER — LANOLIN ALCOHOL/MO/W.PET/CERES
6 CREAM (GRAM) TOPICAL
Status: DISCONTINUED | OUTPATIENT
Start: 2024-01-30 | End: 2024-02-08 | Stop reason: HOSPADM

## 2024-01-30 RX ORDER — SODIUM CHLORIDE 9 MG/ML
50 INJECTION, SOLUTION INTRAVENOUS CONTINUOUS
Status: CANCELLED | OUTPATIENT
Start: 2024-01-30

## 2024-01-30 RX ORDER — CALCIUM GLUCONATE 20 MG/ML
2 INJECTION, SOLUTION INTRAVENOUS ONCE
Status: COMPLETED | OUTPATIENT
Start: 2024-01-30 | End: 2024-01-30

## 2024-01-30 RX ORDER — CALCIUM GLUCONATE 20 MG/ML
1 INJECTION, SOLUTION INTRAVENOUS ONCE
Status: COMPLETED | OUTPATIENT
Start: 2024-01-30 | End: 2024-01-30

## 2024-01-30 RX ORDER — FAMOTIDINE 10 MG/ML
20 INJECTION, SOLUTION INTRAVENOUS ONCE AS NEEDED
Status: DISCONTINUED | OUTPATIENT
Start: 2024-01-30 | End: 2024-02-02

## 2024-01-30 RX ORDER — SODIUM CHLORIDE, SODIUM GLUCONATE, SODIUM ACETATE, POTASSIUM CHLORIDE, MAGNESIUM CHLORIDE, SODIUM PHOSPHATE, DIBASIC, AND POTASSIUM PHOSPHATE .53; .5; .37; .037; .03; .012; .00082 G/100ML; G/100ML; G/100ML; G/100ML; G/100ML; G/100ML; G/100ML
100 INJECTION, SOLUTION INTRAVENOUS CONTINUOUS
Status: DISCONTINUED | OUTPATIENT
Start: 2024-01-30 | End: 2024-01-30

## 2024-01-30 RX ORDER — LANOLIN ALCOHOL/MO/W.PET/CERES
3 CREAM (GRAM) TOPICAL ONCE
Status: COMPLETED | OUTPATIENT
Start: 2024-01-30 | End: 2024-01-30

## 2024-01-30 RX ORDER — AMOXICILLIN 250 MG/1
500 CAPSULE ORAL ONCE
Status: COMPLETED | OUTPATIENT
Start: 2024-01-30 | End: 2024-01-30

## 2024-01-30 RX ORDER — VANCOMYCIN HYDROCHLORIDE 1 G/200ML
15 INJECTION, SOLUTION INTRAVENOUS ONCE
Status: COMPLETED | OUTPATIENT
Start: 2024-01-30 | End: 2024-01-30

## 2024-01-30 RX ORDER — EPINEPHRINE 1 MG/ML
0.3 INJECTION, SOLUTION, CONCENTRATE INTRAVENOUS ONCE AS NEEDED
Status: DISCONTINUED | OUTPATIENT
Start: 2024-01-30 | End: 2024-02-08 | Stop reason: HOSPADM

## 2024-01-30 RX ORDER — AMOXICILLIN 250 MG/5ML
50 POWDER, FOR SUSPENSION ORAL ONCE
Status: COMPLETED | OUTPATIENT
Start: 2024-01-30 | End: 2024-01-30

## 2024-01-30 RX ORDER — ALBUTEROL SULFATE 90 UG/1
2 AEROSOL, METERED RESPIRATORY (INHALATION) AS NEEDED
Status: DISCONTINUED | OUTPATIENT
Start: 2024-01-30 | End: 2024-02-02

## 2024-01-30 RX ADMIN — ATORVASTATIN CALCIUM 80 MG: 40 TABLET, FILM COATED ORAL at 17:00

## 2024-01-30 RX ADMIN — CHLORHEXIDINE GLUCONATE 15 ML: 1.2 SOLUTION ORAL at 08:00

## 2024-01-30 RX ADMIN — ACETAMINOPHEN 975 MG: 325 TABLET, FILM COATED ORAL at 19:58

## 2024-01-30 RX ADMIN — CALCIUM GLUCONATE 1 G: 20 INJECTION, SOLUTION INTRAVENOUS at 09:11

## 2024-01-30 RX ADMIN — Medication 3 MG: at 01:27

## 2024-01-30 RX ADMIN — CEFTRIAXONE SODIUM 2000 MG: 10 INJECTION, POWDER, FOR SOLUTION INTRAVENOUS at 16:03

## 2024-01-30 RX ADMIN — VANCOMYCIN HYDROCHLORIDE 1000 MG: 1 INJECTION, SOLUTION INTRAVENOUS at 07:54

## 2024-01-30 RX ADMIN — IPRATROPIUM BROMIDE 0.5 MG: 0.5 SOLUTION RESPIRATORY (INHALATION) at 13:00

## 2024-01-30 RX ADMIN — LEVALBUTEROL HYDROCHLORIDE 1.25 MG: 1.25 SOLUTION RESPIRATORY (INHALATION) at 02:51

## 2024-01-30 RX ADMIN — ASPIRIN 81 MG CHEWABLE TABLET 81 MG: 81 TABLET CHEWABLE at 08:00

## 2024-01-30 RX ADMIN — AMOXICILLIN 50 MG: 250 POWDER, FOR SUSPENSION ORAL at 13:58

## 2024-01-30 RX ADMIN — CALCIUM GLUCONATE 2 G: 20 INJECTION, SOLUTION INTRAVENOUS at 07:54

## 2024-01-30 RX ADMIN — SODIUM CHLORIDE, SODIUM GLUCONATE, SODIUM ACETATE, POTASSIUM CHLORIDE, MAGNESIUM CHLORIDE, SODIUM PHOSPHATE, DIBASIC, AND POTASSIUM PHOSPHATE 100 ML/HR: .53; .5; .37; .037; .03; .012; .00082 INJECTION, SOLUTION INTRAVENOUS at 10:01

## 2024-01-30 RX ADMIN — TRIMETHOBENZAMIDE HYDROCHLORIDE 200 MG: 100 INJECTION INTRAMUSCULAR at 19:58

## 2024-01-30 RX ADMIN — CHLORHEXIDINE GLUCONATE 15 ML: 1.2 SOLUTION ORAL at 21:17

## 2024-01-30 RX ADMIN — CLOPIDOGREL BISULFATE 75 MG: 75 TABLET ORAL at 08:00

## 2024-01-30 RX ADMIN — Medication 6 MG: at 21:17

## 2024-01-30 RX ADMIN — HEPARIN SODIUM 2000 UNITS: 1000 INJECTION INTRAVENOUS; SUBCUTANEOUS at 22:45

## 2024-01-30 RX ADMIN — IPRATROPIUM BROMIDE 0.5 MG: 0.5 SOLUTION RESPIRATORY (INHALATION) at 19:57

## 2024-01-30 RX ADMIN — LEVALBUTEROL HYDROCHLORIDE 1.25 MG: 1.25 SOLUTION RESPIRATORY (INHALATION) at 19:57

## 2024-01-30 RX ADMIN — LEVALBUTEROL HYDROCHLORIDE 1.25 MG: 1.25 SOLUTION RESPIRATORY (INHALATION) at 13:00

## 2024-01-30 RX ADMIN — AMOXICILLIN 500 MG: 250 CAPSULE ORAL at 14:30

## 2024-01-30 NOTE — PROGRESS NOTES
Progress Note - Infectious Disease   Loida Duarte 78 y.o. female MRN: [id number]  Unit/Bed#: ICU 01 Encounter: [id number]      Impression/Plan:  Sepsis. POA. E/b fever to 100.7F, tachycardia, leukocytosis to 27, and lactic acidosis on admission. Most likely I/s/o #2, #3, and #4. CT C/A/P on admission showed a 6mm right distal ureteral calculus with right-sided hydronephrosis with perinephric and periureteral stranding. CT chest did not show any evidence of consolidation. Moderate cardiomegaly noted. COVID/Flu/RSV negative. UA obtained with innumerable WBC, large LE, and moderate blood c/f urinary source. Remains afebrile and HDS. WBC uptrended today from 25 to 30. No new symptoms per patient.   Antibiotics as below  Obtain surveillance blood cultures  Follow-up urine culture until finalized  Serial examinations  Monitor temperature/vitals while on IV antibiotics  O2 management per critical care team  Other supportive care per critical care team  Monitoring for potential antimicrobial toxicity by following CBCD and CMP     Polymicrobial bacteremia. Blood cultures on admission positive both sets (2/2) for GNRs and GPCs in pairs identified as E.coli and E.faecalis. Most likely etiology #3 and #4. TTE obtained showed moderate thickening of the mitral valve. Favor LUC to further assess for endocarditis. Given penicillin allergy, as in #8, will start IV vancomycin for coverage of E.faecalis and IV CTX for coverage of E.coli for now.   Continue IV CTX 2g daily and IV vancomycin (dosing per pharmacy)  Follow-up surveillance blood cultures from 1/30 to assess for culture clearance  Follow-up LUC findings to assess for endocarditis  Follow-up sensitivities of E.coli and E.faecalis  Follow-up urine culture   Will narrow antibiotic selection as able based on culture data  Monitoring for potential antimicrobial toxicity by following CBCD and CMP     UTI complicated by pyelonephritis. Likely I/s/o #4. Likely etiology of #2. UA  obtained in ED with innumerable WBC, large leukocytes, and moderate blood. CT A/P on admission showed #4 and perinephric and periureteral stranding. Ucx with >100k GNRs currently.   Antibiotics as above  Follow-up urine culture until finalized  Will adjust antibiotics as able based on culture data  Monitoring for potential antimicrobial toxicity by following CBCD and CMP     Right distal ureteral calculus c/b right-sided hydronephrosis. Likely etiology of #3. CT A/P on admission showed a 6mm right UVJ calculus. Now s/p OR on 1/29 for right ureteral stent placement with Urology.   Close urology follow-up.     Elevated troponin. Cardiology consulted. Suspected nonischemic myocardial injury in setting of sepsis. Started on IV Heparin, ASA, statin, and Plavix.   Management per cardiology     RAKEL. Scr elevated to 2.8 on admission. Unknown baseline as patient has not seen a doctor in ~50 years. Consider pre-renal I/s/o sepsis vs obstructive I/s/o renal calculus.   Dose adjust antibiotics as needed  Continue to trend Scr  Diuresis per primary team     Acute hypoxic respiratory failure. POA. Noted to be 88% on room air. Also with diffuse wheezing on exam. CT chest demonstrated no acute pulmonary abnormalities. Showed cardiomegaly. Currently requiring 5L NC. Consider pulmonary edema.   Continue to monitor respiratory status  O2 management per primary team   Low concern for pneumonia at this time     Penicillin allergy. Allergy listed as swelling. States she had a rash as a child, but has not had it since. Has not seen a doctor in 50 years. Unclear if true penicillin allergy still.   Will clarify further with patient.  Consider amoxicillin challenge if necessary     Morbid obesity. BMI noted to be 51 on admission. May affect antibiotic dosing.     Above plan was discussed in detail with patient at bedside.   Above plan was discussed in detail with critical care team who agrees with the above plan to continue cefazolin,  follow-up repeat blood cultures, and obtain LUC.    Antibiotics:  Vancomycin D#2  Ceftriaxone D#2    Antibiotic D#3    Subjective:  Patient reports no new symptoms. Reports intermittent cough. States she has not had a bowel movement. No fevers or chills. Also denies nausea, vomiting, diarrhea, CP. Reports ongoing SOB and increased work of breathing. Denies acute chest pain. Discussed LUC and patient was agreeable.     Objective:  Vitals:  Temp:  [97.8 °F (36.6 °C)-98.9 °F (37.2 °C)] 98 °F (36.7 °C)  HR:  [] 71  Resp:  [18-26] 26  BP: (100-177)/(53-95) 177/95  SpO2:  [90 %-97 %] 97 %  Temp (24hrs), Av.1 °F (36.7 °C), Min:97.8 °F (36.6 °C), Max:98.9 °F (37.2 °C)  Current: Temperature: 98 °F (36.7 °C)    Physical Exam:   General Appearance:  Chronically ill appearing, NAD.   Throat: Oropharynx moist without lesions. White coating on tongue.    Lungs:   Audible wheezes, otherwise grossly clear to auscultation bilaterally, increased work of breathing, on 5L NC.    Heart:  RRR; no murmur, rub or gallop.   Abdomen:   Soft, non-tender, non-distended, positive bowel sounds.     Extremities: No clubbing or cyanosis. +peripheral edema   Skin: No new rashes, lesions, or draining wounds noted on exposed skin.     Labs, Imaging, & Other studies:   All pertinent labs and imaging studies were personally reviewed  Results from last 7 days   Lab Units 24  0615 24  0543 24  0210   WBC Thousand/uL 30.35* 25.47* 30.32*   HEMOGLOBIN g/dL 13.5 12.8 12.6   PLATELETS Thousands/uL 103* 121* 133*     Results from last 7 days   Lab Units 24  0615 24  0543 24  0210 24  2126   POTASSIUM mmol/L 4.0 4.6   < > 3.7   CHLORIDE mmol/L 101 100   < > 99   CO2 mmol/L 22 23   < > 23   BUN mg/dL 54* 40*   < > 34*   CREATININE mg/dL 2.89* 2.80*   < > 2.58*   EGFR ml/min/1.73sq m 14 15   < > 16   CALCIUM mg/dL 8.6 7.9*   < > 8.8   AST U/L  --  64*  --  46*   ALT U/L  --  16  --  11   ALK PHOS U/L  --  60   --  69    < > = values in this interval not displayed.     Results from last 7 days   Lab Units 01/28/24 2230 01/28/24 2131 01/28/24 2126   GRAM STAIN RESULT   --  Gram negative rods*  Gram positive cocci in pairs* Gram negative rods*  Gram positive cocci in pairs*   URINE CULTURE  >100,000 cfu/ml Gram Negative Niels*  --   --      Results from last 7 days   Lab Units 01/28/24 2126   PROCALCITONIN ng/ml 230.37*                     Imaging Studies:  No new imaging studies for review       Noreen Meredith PA-C  [facility]

## 2024-01-30 NOTE — NURSING NOTE
Amoxicillin doses given at 1400 and 1430 without any adverse reaction per pt. Vitals stable, no SOB/itching per pt, no rash observed after skin assessment. Provider made aware.

## 2024-01-30 NOTE — ASSESSMENT & PLAN NOTE
Pt noted to be SOB, wheezing, tachypnea. Endorsed chest pain while in OR.  hsTroponin - 14,797  BNP - 2,737  LS diminished, wheezing more upper airway no stridor.   CXR unremarkable  Cardiac echo showed EF 65%, and grade 1 diastolic dysfunction    Plan:  Cardiology consulted, appreciate recommendations  If repeat troponin has positive delta change rec: ASA/Plavix/heparin gtt  Recommend diuresis  Echo in AM  Continue to trend hsTroponins  Hold diuresis for now in setting of severe sepsis. Will consider if respiratory status worsens.  Continue supplemental oxygen

## 2024-01-30 NOTE — PROGRESS NOTES
"General Cardiology   Progress Note -  Team One   Loida Duarte 78 y.o. female MRN: [id number]  Unit/Bed#: ICU 01 Encounter: [id number]    Assessment     Elevated troponin- likely type 2 MI in setting of sepsis and acute hypoxic respiratory failure  No c/o chest pain on admission. Admits to random episodes \"when my  stresses me out\"  Hs troponin trend: 14,797-->11,339-->12,709  ECG- sinus tachycardia with RBBB  TTE LVEF 65% with inferior wall hypokinesis, normal RV size/function, moderate mitral valve thickening with mild MR, and mild TR  CT notes coronary artery calcifications  Started on IV Heparin, ASA, statin, and Plavix  There was some concern for coffee ground emesis PTA but she also was eating black licorice which is what she thinks caused it. No evidence of acute bleeding in hospital.     Acute hypoxic respiratory failure  BNP 2737. CXR read pending  Diffuse wheezing on exam but no edema  Currently requiring 5LNC  Diuretics held 2/2 active sepsis  TTE pending     Urosepsis secondary to right ureteral calculus  Final urine culture pending  Blood cx growing gram negative rods and gram + cocci  On IV antibiotics per primary team     RAKEL- creatinine 2.8. unknown baseline. Suspect 2/2 severe sepsis and obstructing renal calculus     Dyslipidemia- , , HDL 37, . Started on atorvastatin 80 mg daily     Morbid obesity- BMI 51.89     Plan  Hs troponin elevation secondary to severe sepsis and hypoxia  Continue IV heparin, ASA, Plavix, and statin  Cardiac cath prior to d/c- timing to be determined pending improvement from sepsis.   Strict I/Os, daily weights, am BMP  Discussed with critical care team- agree with trial of IVF today  Tentative LUC tomorrow in setting of bacteremia with thickening of mitral valve on TTE     Subjective  Review of Systems   Constitutional: Negative for chills and malaise/fatigue.   Cardiovascular:  Negative for chest pain, dyspnea on exertion, leg swelling, " "orthopnea, palpitations and syncope.   Respiratory:  Positive for cough, sputum production and wheezing. Negative for shortness of breath and sleep disturbances due to breathing.    Gastrointestinal:  Negative for bloating, nausea and vomiting.   Neurological:  Negative for dizziness, light-headedness and weakness.   Psychiatric/Behavioral:  Negative for altered mental status.    All other systems reviewed and are negative.    Objective:   Vitals: Blood pressure 100/53, pulse 69, temperature 98.9 °F (37.2 °C), temperature source Axillary, resp. rate 18, height 4' 9.01\" (1.448 m), weight 109 kg (239 lb 13.8 oz), SpO2 97%., Body mass index is 51.89 kg/m².,     Systolic (24hrs), Av , Min:100 , Max:167     Diastolic (24hrs), Av, Min:53, Max:77        Intake/Output Summary (Last 24 hours) at 2024 0914  Last data filed at 2024 0800  Gross per 24 hour   Intake 327.06 ml   Output 1250 ml   Net -922.94 ml     Wt Readings from Last 3 Encounters:   24 109 kg (239 lb 13.8 oz)     Telemetry Review: NSR    Physical Exam  Vitals reviewed.   Constitutional:       General: She is not in acute distress.     Appearance: She is obese.   Neck:      Vascular: No hepatojugular reflux or JVD.   Cardiovascular:      Rate and Rhythm: Normal rate and regular rhythm.      Heart sounds: Normal heart sounds. No murmur heard.     No friction rub. No gallop.   Pulmonary:      Effort: No respiratory distress.      Breath sounds: Wheezing present. No rales.      Comments: Tachypneic with scattered crackles and audible wheezing  5LNC  Abdominal:      General: Bowel sounds are normal. There is no distension.      Palpations: Abdomen is soft.      Tenderness: There is no abdominal tenderness.   Musculoskeletal:         General: No tenderness. Normal range of motion.      Cervical back: Neck supple.      Right lower leg: No edema.      Left lower leg: No edema.   Skin:     General: Skin is warm and dry.      Capillary Refill: " "Capillary refill takes less than 2 seconds.      Findings: No erythema.   Neurological:      Mental Status: She is alert and oriented to person, place, and time.   Psychiatric:         Mood and Affect: Mood normal.     LABORATORY RESULTS      CBC with diff:   Results from last 7 days   Lab Units 01/29/24  0543 01/29/24 0210 01/28/24 2126   WBC Thousand/uL 25.47* 30.32* 27.34*   HEMOGLOBIN g/dL 12.8 12.6 13.7   HEMATOCRIT % 39.1 39.2 42.3   MCV fL 99* 101* 98   PLATELETS Thousands/uL 121* 133* 148*   RBC Million/uL 3.94 3.90 4.32   MCH pg 32.5 32.3 31.7   MCHC g/dL 32.7 32.1 32.4   RDW % 14.2 14.3 13.9   MPV fL 12.1 11.7 11.4   NRBC AUTO /100 WBCs 0  --   --      CMP:  Results from last 7 days   Lab Units 01/30/24  0615 01/29/24  0543 01/29/24 0210 01/28/24 2126   POTASSIUM mmol/L 4.0 4.6 3.9 3.7   CHLORIDE mmol/L 101 100 100 99   CO2 mmol/L 22 23 23 23   BUN mg/dL 54* 40* 37* 34*   CREATININE mg/dL 2.89* 2.80* 2.75* 2.58*   CALCIUM mg/dL 8.6 7.9* 7.9* 8.8   AST U/L  --  64*  --  46*   ALT U/L  --  16  --  11   ALK PHOS U/L  --  60  --  69   EGFR ml/min/1.73sq m 14 15 15 16     BMP:  Results from last 7 days   Lab Units 01/30/24  0615 01/29/24  0543 01/29/24 0210 01/28/24 2126   POTASSIUM mmol/L 4.0 4.6 3.9 3.7   CHLORIDE mmol/L 101 100 100 99   CO2 mmol/L 22 23 23 23   BUN mg/dL 54* 40* 37* 34*   CREATININE mg/dL 2.89* 2.80* 2.75* 2.58*   CALCIUM mg/dL 8.6 7.9* 7.9* 8.8     Lab Results   Component Value Date    CREATININE 2.89 (H) 01/30/2024    CREATININE 2.80 (H) 01/29/2024    CREATININE 2.75 (H) 01/29/2024      Results from last 7 days   Lab Units 01/30/24  0615 01/29/24  0543 01/29/24  0210   MAGNESIUM mg/dL 2.3 2.2 1.5*        Results from last 7 days   Lab Units 01/29/24  0543   HEMOGLOBIN A1C % 5.3              Results from last 7 days   Lab Units 01/29/24  0748 01/28/24  2126   INR  1.43* 1.22*       Lipid Profile:   No results found for: \"CHOL\"  Lab Results   Component Value Date    HDL 37 (L) " 01/29/2024     Lab Results   Component Value Date    LDLCALC 123 (H) 01/29/2024     Lab Results   Component Value Date    TRIG 223 (H) 01/29/2024     Meds/Allergies   all current active meds have been reviewed and current meds:   Current Facility-Administered Medications   Medication Dose Route Frequency    acetaminophen (TYLENOL) tablet 975 mg  975 mg Oral Q6H PRN    aspirin chewable tablet 81 mg  81 mg Oral Daily    atorvastatin (LIPITOR) tablet 80 mg  80 mg Oral QPM    calcium gluconate 1 g in sodium chloride 0.9% 50 mL (premix)  1 g Intravenous Once    cefTRIAXone (ROCEPHIN) 2,000 mg in dextrose 5 % 50 mL IVPB  2,000 mg Intravenous Q24H    chlorhexidine (PERIDEX) 0.12 % oral rinse 15 mL  15 mL Mouth/Throat Q12H GUNJAN    clopidogrel (PLAVIX) tablet 75 mg  75 mg Oral Daily    heparin (porcine) 25,000 units in 0.45% NaCl 250 mL infusion (premix)  3-20 Units/kg/hr (Order-Specific) Intravenous Titrated    heparin (porcine) injection 2,000 Units  2,000 Units Intravenous Q6H PRN    heparin (porcine) injection 4,000 Units  4,000 Units Intravenous Q6H PRN    ipratropium (ATROVENT) 0.02 % inhalation solution 0.5 mg  0.5 mg Nebulization TID    levalbuterol (XOPENEX) inhalation solution 1.25 mg  1.25 mg Nebulization TID    melatonin tablet 6 mg  6 mg Oral HS    multi-electrolyte (PLASMALYTE-A/ISOLYTE-S PH 7.4) IV solution  100 mL/hr Intravenous Continuous    trimethobenzamide (TIGAN) IM injection 200 mg  200 mg Intramuscular Q6H PRN    vancomycin (VANCOCIN) IVPB (premix in dextrose) 1,000 mg 200 mL  15 mg/kg (Adjusted) Intravenous Daily PRN     No medications prior to admission.     heparin (porcine), 3-20 Units/kg/hr (Order-Specific), Last Rate: 4.1 Units/kg/hr (01/30/24 0732)  multi-electrolyte, 100 mL/hr      Counseling / Coordination of Care  Total floor / unit time spent today 20 minutes.  Greater than 50% of total time was spent with the patient and / or family counseling and / or coordination of care.      ** Please  Note: Dragon 360 Dictation voice to text software may have been used in the creation of this document. **

## 2024-01-30 NOTE — NURSING NOTE
Consulted for possible Midline. Patient has a GFR of 14. 2 PIV's present. No vas team needs at this time.

## 2024-01-30 NOTE — ASSESSMENT & PLAN NOTE
As evidence by fever (100.7), tachycardia (110), tachypnea (26), leukocytosis (30)  Suspected source: Urosepsis  CT chest/ABD/pelvis - 6mm calculus in the right ureterovesical junction and moderate hydronephrosis.  Lactate 3.8  Procalcitonin - 230  Received 500cc resuscitation fluid in ED, not 30cc/kg given concerns for respiratory status as pt noted to be tachyneic, hypoxic and wheezing on arrival.  Blood cultures x 2 drawn  UA: +leukocytes, +WBC, occasional bacteria, negative nitrites  Received Vancomycin, Cefepime, Cipro in ED  Blood cultures positive for E coli and enterococcus faecalis    Plan:  Continue ABX: Ceftriaxone, Vanco  ID consulted, recommendations appreciated  Follow up blood cultures  Follow up urine cultures  Hold IVF at this time given respiratory status  Trend WBC, fever curve.

## 2024-01-30 NOTE — QUICK NOTE
Talked with patient's son at the bedside, gave him updates with patient's consent, answered questions to his satisfaction.

## 2024-01-30 NOTE — ASSESSMENT & PLAN NOTE
Initial sCr - 2.58  Unknown baseline sCr   Likely worsened in setting of severe sepsis, obstructing renal calculus  Received 500cc bolus in ED, additional fluids held given respiratory status.  Decreased UO since arrival, will consider gentle hydration with close monitoring of respiratory status  Maintain navas catheter  Strict I&O  Avoid nephrotoxins and hypotension  Monitor and replete electrolytes  Repeat BMP in AM

## 2024-01-30 NOTE — PROGRESS NOTES
[facility]  Progress Note  Name: Loida Duarte I  MRN: [id number]  Unit/Bed#: ICU 01 I Date of Admission: 1/28/2024   Date of Service: 1/30/2024 I Hospital Day: 1    Assessment/Plan   Severe sepsis (HCC)  Assessment & Plan  As evidence by fever (100.7), tachycardia (110), tachypnea (26), leukocytosis (30)  Suspected source: Urosepsis  CT chest/ABD/pelvis - 6mm calculus in the right ureterovesical junction and moderate hydronephrosis.  Lactate 3.8  Procalcitonin - 230  Received 500cc resuscitation fluid in ED, not 30cc/kg given concerns for respiratory status as pt noted to be tachyneic, hypoxic and wheezing on arrival.  Blood cultures x 2 drawn  UA: +leukocytes, +WBC, occasional bacteria, negative nitrites  Received Vancomycin, Cefepime, Cipro in ED  Blood cultures positive for E coli and enterococcus faecalis    Plan:  Continue ABX: Ceftriaxone, Vanco  ID consulted, recommendations appreciated  Follow up blood cultures  Follow up urine cultures  Hold IVF at this time given respiratory status  Trend WBC, fever curve.    * Right ureteral calculus  Assessment & Plan  CT chest/ABD/pelvis - CT chest/ABD/pelvis - 6mm calculus in the right ureterovesical junction and moderate hydronephrosis.  Urology consulted in ED and patient taken to OR for cysto with stent placement  Given tenuous respiratory status, pt was not intubated, given light sedation, tolerated well.  Continue ABX, see plan above  Hold IVF for now  Maintain navas catheter  Monitor I&O  Monitor renal function    Elevated troponin  Assessment & Plan  Pt noted to be SOB, wheezing, tachypnea. Endorsed chest pain while in OR.  hsTroponin - 14,797  BNP - 2,737  LS diminished, wheezing more upper airway no stridor.   CXR unremarkable  Cardiac echo showed EF 65%, and grade 1 diastolic dysfunction    Plan:  Cardiology consulted, appreciate recommendations  If repeat troponin has positive delta change rec: ASA/Plavix/heparin gtt  Recommend  "diuresis  Echo in AM  Continue to trend hsTroponins  Hold diuresis for now in setting of severe sepsis. Will consider if respiratory status worsens.  Continue supplemental oxygen    Acute respiratory insufficiency  Assessment & Plan  Pt presented with tachypnea, increased WOB and audible expiratory wheezing, SpO2 88% on room air.  No known pulmonary history, briefly smoked cigarettes many years ago.  Spo2 improved on 2L NC but wheezes persist. Albutrol x1 given in ED with minimal improvement.   LS diminished with wheeze in upper airway, no stridor  CXR - no acute cardiopulmonary abnormalities.  CT chest - No acute findings, no consolidation, pneumothorax, pleural effusion  BNP - 2,737  Most likely volume overload    Plan:  Continue supplemental oxygen for goal SpO2 > 92%, currently on 5L NC  Check ABG  Xopenex as needed for wheezing    RAKEL (acute kidney injury) (HCC)  Assessment & Plan  Initial sCr - 2.58  Unknown baseline sCr   Likely worsened in setting of severe sepsis, obstructing renal calculus  Received 500cc bolus in ED, additional fluids held given respiratory status.  Decreased UO since arrival, will consider gentle hydration with close monitoring of respiratory status  Maintain navas catheter  Strict I&O  Avoid nephrotoxins and hypotension  Monitor and replete electrolytes  Repeat BMP in AM    Obstructive pyelonephritis  Assessment & Plan  Patient had right-sided ureterovesical calculus and hydronephrosis   Patient had urology consulted. Cystoscopy ureteroscopy and stent placement             Chief Complaint: \" I guess I am all right\"    HPI/24hr events: The last 24 hours no significant events, patient is still on broad-spectrum antibiotics.  She said that overnight she received a Xopenex nebulizer which helped her symptoms and made her feel better.    Physical Exam:     Physical Exam  Vitals and nursing note reviewed.   Constitutional:       General: She is not in acute distress.     Appearance: She is " "well-developed.   HENT:      Head: Normocephalic and atraumatic.   Eyes:      Conjunctiva/sclera: Conjunctivae normal.   Cardiovascular:      Rate and Rhythm: Normal rate and regular rhythm.      Heart sounds: No murmur heard.  Pulmonary:      Effort: Pulmonary effort is normal. No respiratory distress.      Breath sounds: Wheezing present.   Abdominal:      Palpations: Abdomen is soft.      Tenderness: There is no abdominal tenderness.   Musculoskeletal:         General: No swelling.      Cervical back: Neck supple.      Right lower leg: Edema present.      Left lower leg: Edema present.   Skin:     General: Skin is warm and dry.      Capillary Refill: Capillary refill takes less than 2 seconds.   Neurological:      Mental Status: She is alert.   Psychiatric:         Mood and Affect: Mood normal.           Vitals:    24 0300 24 0400 24 0500 24 0600   BP:       BP Location:       Pulse: 79 75 72 77   Resp: 22 18 (!) 23 21   Temp:       TempSrc:       SpO2: 96% 95% 96% 94%   Weight:    109 kg (239 lb 13.8 oz)   Height:                 Temperature:   Temp (24hrs), Av.1 °F (36.7 °C), Min:97.8 °F (36.6 °C), Max:98.9 °F (37.2 °C)    Current: Temperature: 98 °F (36.7 °C)    Weights:   IBW (Ideal Body Weight): 38.62 kg    Body mass index is 51.89 kg/m².  Weight (last 2 days)       Date/Time Weight    24 0600 109 (239.86)    24 0212 109 (239.86)    24 0931 109 (240)    24 0209 109 (240.96)    24 0041 109 (240.96)    24 2123 110 (242.73)            Hemodynamic Monitoring:  PAP:       Non-Invasive/Invasive Ventilation Settings:  Respiratory      Lab Data (Last 4 hours)      None           O2/Vent Data (Last 4 hours)      None                  No results found for: \"PHART\", \"NOQ8ZOB\", \"PO2ART\", \"LKK1PIB\", \"L3YSSYZT\", \"BEART\", \"SOURCE\"  SpO2: SpO2: 94 %    Intake and Outputs:  I/O             P.O.  120    I.V. (mL/kg) " "100 (0.9) 136.9 (1.3)    IV Piggyback 330 60    Total Intake(mL/kg) 430 (3.9) 316.9 (2.9)    Urine (mL/kg/hr) 200 1250 (0.5)    Total Output 200 1250    Net +230 -933.1                UOP: 52/hour   Nutrition:        Diet Orders   (From admission, onward)                 Start     Ordered    01/29/24 1145  Diet Clear Liquid  Diet effective now        References:    Adult Nutrition Support Algorithm    RD Therapeutic Diet Order Protocol   Question Answer Comment   Diet Type Clear Liquid    RD to adjust diet per protocol? Yes        01/29/24 1145                    Labs:   Results from last 7 days   Lab Units 01/29/24  0543 01/29/24  0210 01/28/24 2126   WBC Thousand/uL 25.47* 30.32* 27.34*   HEMOGLOBIN g/dL 12.8 12.6 13.7   HEMATOCRIT % 39.1 39.2 42.3   PLATELETS Thousands/uL 121* 133* 148*   NEUTROS PCT % 86*  --   --    MONOS PCT % 5  --   --    MONO PCT %  --   --  3*   EOS PCT % 0  --  0      Results from last 7 days   Lab Units 01/29/24  0543 01/29/24 0210 01/28/24 2126   SODIUM mmol/L 134* 134* 135   POTASSIUM mmol/L 4.6 3.9 3.7   CHLORIDE mmol/L 100 100 99   CO2 mmol/L 23 23 23   BUN mg/dL 40* 37* 34*   CREATININE mg/dL 2.80* 2.75* 2.58*   CALCIUM mg/dL 7.9* 7.9* 8.8   ALK PHOS U/L 60  --  69   ALT U/L 16  --  11   AST U/L 64*  --  46*     Results from last 7 days   Lab Units 01/29/24  0543 01/29/24  0210   MAGNESIUM mg/dL 2.2 1.5*     Results from last 7 days   Lab Units 01/29/24  0543 01/29/24  0210   PHOSPHORUS mg/dL 4.3* 3.5      Results from last 7 days   Lab Units 01/29/24  2327 01/29/24  1407 01/29/24  0748 01/28/24 2126   INR   --   --  1.43* 1.22*   PTT seconds 100* 162* 45* 36     Results from last 7 days   Lab Units 01/29/24  0410   LACTIC ACID mmol/L 1.9     No results found for: \"TROPONINI\"    Imaging:     FL retrograde pyelogram   Final Result by Clint Yanez MD (01/29 6522)      Fluoroscopic guidance provided for right retrograde pyelogram. Please see procedure report for further " "details.      Workstation performed: [id number]         CT chest abdomen pelvis wo contrast   ED Interpretation by Fred Espinoza MD (01/29 0010)   Read on [facility].    CT Chest  IMPRESSION:   1. No acute findings in the chest.   2. For details regarding the abdominal and pelvic findings, refer to the CT abdomen and pelvis report below      CT Abd/Pel  IMPRESSION:   1. 6 mm calculus in the right ureterovesical junction and moderate right hydroureteronephrosis.    2. Cholelithiasis without CT evidence for cholecystitis.   3. Colonic diverticulosis without evidence for diverticulitis.   4. For details regarding the chest findings, refer to the CT chest report above.         Final Result by Marisa Pinon MD (01/29 0874)   Trace right pleural fluid.   Moderate cardiomegaly.   6 mm right UVJ calculus. Moderate right hydroureteronephrosis. Perinephric and periureteral stranding.   Cholelithiasis. No radiographic evidence of cholecystitis.   Colonic diverticulosis.   Other findings, as per the body of the report.         Preliminary report from [facility] was provided on 1/28/2024 at 11:35 p.m.         Workstation performed: [id number]         XR chest 1 view portable   ED Interpretation by Fausto Strickland DO (01/28 7121)   Cardiomegaly, no other acute dz      Final Result by Tereso Briggs MD (01/29 1303)      Enlarged cardiomediastinal silhouette. No apical pleural capping.                  Workstation performed: [id number]            I have personally reviewed pertinent reports.      Micro:  No results found for: \"BLOODCX\", \"URINECX\", \"WOUNDCULT\", \"SPUTUMCULTUR\"    Allergies:   Allergies   Allergen Reactions    Penicillins Swelling       Medications:   Scheduled Meds:  Current Facility-Administered Medications   Medication Dose Route Frequency Provider Last Rate    acetaminophen  975 mg Oral Q6H PRN MARLENE Falk      aspirin  81 mg Oral Daily Luiz Calle MD      atorvastatin  80 mg Oral QPM Luiz " "MD Alva      cefTRIAXone  2,000 mg Intravenous Q24H Noreen Meredith PA-C 2,000 mg (01/29/24 1618)    chlorhexidine  15 mL Mouth/Throat Q12H [facility] Barrera Vargas MD      clopidogrel  75 mg Oral Daily Luiz Calle MD      heparin (porcine)  3-20 Units/kg/hr (Order-Specific) Intravenous Titrated Luiz Calle MD 6.1 Units/kg/hr (01/30/24 0040)    heparin (porcine)  2,000 Units Intravenous Q6H PRN Luiz Calle MD      heparin (porcine)  4,000 Units Intravenous Q6H PRN Luiz Calle MD      levalbuterol  1.25 mg Nebulization Q6H PRN MARLENE Ferguson      melatonin  6 mg Oral HS MARLENE Villarreal      trimethobenzamide  200 mg Intramuscular Q6H PRN Luiz Calle MD      vancomycin  15 mg/kg (Adjusted) Intravenous Daily PRN Barrera Vargas MD       Continuous Infusions:heparin (porcine), 3-20 Units/kg/hr (Order-Specific), Last Rate: 6.1 Units/kg/hr (01/30/24 0040)      PRN Meds:  acetaminophen, 975 mg, Q6H PRN  heparin (porcine), 2,000 Units, Q6H PRN  heparin (porcine), 4,000 Units, Q6H PRN  levalbuterol, 1.25 mg, Q6H PRN  trimethobenzamide, 200 mg, Q6H PRN  vancomycin, 15 mg/kg (Adjusted), Daily PRN        VTE Pharmacologic Prophylaxis: Sequential compression device (Venodyne)  and Heparin  VTE Mechanical Prophylaxis: sequential compression device    Invasive lines and devices:  Invasive Devices       Peripheral Intravenous Line  Duration             Peripheral IV 01/28/24 Left Antecubital 1 day    Peripheral IV 01/29/24 Left;Ventral (anterior) Forearm <1 day              Drain  Duration             Urethral Catheter Latex;Double-lumen 18 Fr. 1 day                        Code Status: Level 3 - DNAR and DNI     Portions of the record may have been created with voice recognition software.  Occasional wrong word or \"sound a like\" substitutions may have occurred due to the inherent limitations of voice recognition software.  Read the chart " carefully and recognize, using context, where substitutions have occurred.     Luiz Calle MD

## 2024-01-30 NOTE — PROGRESS NOTES
Loida Duarte is a 78 y.o. female who is currently ordered Vancomycin IV with management by the Pharmacy Consult service.  Relevant clinical data and objective / subjective history reviewed.  Vancomycin Assessment:  Indication and Goal AUC/Trough: Urinary tract infection (goal -600, trough >10); Bacteremia (goal -600, trough >10)  Clinical Status:  critical care  Micro:     Renal Function:  SCr: 2.89 mg/dL  CrCl: 17 mL/min  Renal replacement: Not on dialysis  Days of Therapy: 2  Current Dose: pulse dose 15 mg/kg (1000 mg) PRN for random vanco level <15; 1/30 level resulted at 12.6 mcg/ml so dose was sent  Vancomycin Plan:  New Dosing: continue pulse dosing  Next Level: 1/31 @ 0600  Renal Function Monitoring: Daily BMP and UOP  Pharmacy will continue to follow closely for s/sx of nephrotoxicity, infusion reactions and appropriateness of therapy.  BMP and CBC will be ordered per protocol. We will continue to follow the patient’s culture results and clinical progress daily.    Divya Lakhani, Pharmacist

## 2024-01-30 NOTE — ASSESSMENT & PLAN NOTE
Patient had right-sided ureterovesical calculus and hydronephrosis   Patient had urology consulted. Cystoscopy ureteroscopy and stent placement

## 2024-01-30 NOTE — QUICK NOTE
"Quick Note - Infectious Disease   Loida Duarte 78 y.o. female MRN: [id number]  Unit/Bed#: ICU 01 Encounter: [id number]      Impression:  Plan for amoxicillin challenge today in setting of penicillin allergy as a child. Patient reports her reaction was a rash and \"swelling\". She was unable to clarify where the swelling was but denied having anaphylaxis. Although she had an allergy as a child, will proceed with a graded 2-step amoxicillin challenge given unknown \"swelling\". Verbal consent obtained from patient at 1:15 pm. If patient passes amoxicillin challenge, will plan to narrow antibiotics for bacteremia.    Plan:    Orders placed:  - Amoxicillin 50mg suspension PO one-time dose to be given at 1400  - If NO reaction to first dose, 30 minutes later, Amoxicillin 500mg suspension PO one-time dose to be given at 1430  - Epinephrine 1mg/ml injection 0.3mg IM once as needed for anaphylaxis   - Diphenhydramine 25mg IV every 6 hours PRN for itching   - Famotidine 20mg IV once as needed for any allergic reaction during Amoxicillin challenge   - Albuterol inhaler PRN for SOB   - Please notify myself and ICU Resident, Dr. Calle of any allergic reaction/anaphylaxis (e.g. urticaria, hives, rash, pruritus, angioedema, wheezing, bronchospasm, dyspnea, hemodynamic instability, abdominal cramping/nausea, dizziness)   - Nursing assessment: Vital signs every 15 minutes x 4 (starting at 1415)  - Observe patient during administration and for 30 minutes after EACH dose    Nurse to document in EPIC:   -Time dose was administered  -Time observation ended   - Observed challenge reaction (if any), time of reaction, and if any treatment medications were administered   - If no reaction occurred - document that patient tolerated Amoxicillin challenge without an adverse reaction and all Penicillins can be administered as indicated     Noreen Meredith PA-C  [facility]           "

## 2024-01-31 ENCOUNTER — ANESTHESIA (INPATIENT)
Dept: GASTROENTEROLOGY | Facility: HOSPITAL | Age: 79
DRG: 853 | End: 2024-01-31
Payer: MEDICARE

## 2024-01-31 ENCOUNTER — ANESTHESIA EVENT (INPATIENT)
Dept: GASTROENTEROLOGY | Facility: HOSPITAL | Age: 79
DRG: 853 | End: 2024-01-31
Payer: MEDICARE

## 2024-01-31 ENCOUNTER — APPOINTMENT (INPATIENT)
Dept: GASTROENTEROLOGY | Facility: HOSPITAL | Age: 79
DRG: 853 | End: 2024-01-31
Payer: MEDICARE

## 2024-01-31 LAB
ANION GAP SERPL CALCULATED.3IONS-SCNC: 9 MMOL/L
APTT PPP: 75 SECONDS (ref 23–37)
BACTERIA BLD CULT: ABNORMAL
BACTERIA UR CULT: ABNORMAL
BACTERIA UR CULT: ABNORMAL
BASOPHILS # BLD MANUAL: 0 THOUSAND/UL (ref 0–0.1)
BASOPHILS NFR MAR MANUAL: 0 % (ref 0–1)
BUN SERPL-MCNC: 57 MG/DL (ref 5–25)
CA-I BLD-SCNC: 1.12 MMOL/L (ref 1.12–1.32)
CALCIUM SERPL-MCNC: 8.5 MG/DL (ref 8.4–10.2)
CHLORIDE SERPL-SCNC: 103 MMOL/L (ref 96–108)
CO2 SERPL-SCNC: 22 MMOL/L (ref 21–32)
CREAT SERPL-MCNC: 2.26 MG/DL (ref 0.6–1.3)
E COLI DNA BLD POS QL NAA+NON-PROBE: DETECTED
E FAECALIS DNA BLD POS QL NAA+NON-PROBE: DETECTED
EOSINOPHIL # BLD MANUAL: 0 THOUSAND/UL (ref 0–0.4)
EOSINOPHIL NFR BLD MANUAL: 0 % (ref 0–6)
ERYTHROCYTE [DISTWIDTH] IN BLOOD BY AUTOMATED COUNT: 14.6 % (ref 11.6–15.1)
GFR SERPL CREATININE-BSD FRML MDRD: 19 ML/MIN/1.73SQ M
GLUCOSE SERPL-MCNC: 83 MG/DL (ref 65–140)
GRAM STN SPEC: ABNORMAL
HCT VFR BLD AUTO: 38.4 % (ref 34.8–46.1)
HEMOCCULT STL QL: POSITIVE
HGB BLD-MCNC: 12.3 G/DL (ref 11.5–15.4)
LYMPHOCYTES # BLD AUTO: 0.45 THOUSAND/UL (ref 0.6–4.47)
LYMPHOCYTES # BLD AUTO: 2 % (ref 14–44)
MAGNESIUM SERPL-MCNC: 2.2 MG/DL (ref 1.9–2.7)
MCH RBC QN AUTO: 31.7 PG (ref 26.8–34.3)
MCHC RBC AUTO-ENTMCNC: 32 G/DL (ref 31.4–37.4)
MCV RBC AUTO: 99 FL (ref 82–98)
MONOCYTES # BLD AUTO: 1.14 THOUSAND/UL (ref 0–1.22)
MONOCYTES NFR BLD: 5 % (ref 4–12)
NEUTROPHILS # BLD MANUAL: 21.15 THOUSAND/UL (ref 1.85–7.62)
NEUTS BAND NFR BLD MANUAL: 13 % (ref 0–8)
NEUTS SEG NFR BLD AUTO: 80 % (ref 43–75)
PHOSPHATE SERPL-MCNC: 3.4 MG/DL (ref 2.3–4.1)
PLATELET # BLD AUTO: 92 THOUSANDS/UL (ref 149–390)
PLATELET BLD QL SMEAR: ABNORMAL
PMV BLD AUTO: 12.2 FL (ref 8.9–12.7)
POTASSIUM SERPL-SCNC: 4.5 MMOL/L (ref 3.5–5.3)
RBC # BLD AUTO: 3.88 MILLION/UL (ref 3.81–5.12)
RBC MORPH BLD: NORMAL
SODIUM SERPL-SCNC: 134 MMOL/L (ref 135–147)
VANCOMYCIN SERPL-MCNC: 15.9 UG/ML (ref 10–20)
WBC # BLD AUTO: 22.74 THOUSAND/UL (ref 4.31–10.16)

## 2024-01-31 PROCEDURE — 99233 SBSQ HOSP IP/OBS HIGH 50: CPT | Performed by: INTERNAL MEDICINE

## 2024-01-31 PROCEDURE — 85007 BL SMEAR W/DIFF WBC COUNT: CPT

## 2024-01-31 PROCEDURE — 82330 ASSAY OF CALCIUM: CPT

## 2024-01-31 PROCEDURE — 82272 OCCULT BLD FECES 1-3 TESTS: CPT

## 2024-01-31 PROCEDURE — 83735 ASSAY OF MAGNESIUM: CPT

## 2024-01-31 PROCEDURE — 88305 TISSUE EXAM BY PATHOLOGIST: CPT | Performed by: SPECIALIST

## 2024-01-31 PROCEDURE — 99232 SBSQ HOSP IP/OBS MODERATE 35: CPT | Performed by: INTERNAL MEDICINE

## 2024-01-31 PROCEDURE — C9113 INJ PANTOPRAZOLE SODIUM, VIA: HCPCS | Performed by: PHYSICIAN ASSISTANT

## 2024-01-31 PROCEDURE — 80202 ASSAY OF VANCOMYCIN: CPT | Performed by: INTERNAL MEDICINE

## 2024-01-31 PROCEDURE — 94640 AIRWAY INHALATION TREATMENT: CPT

## 2024-01-31 PROCEDURE — C9113 INJ PANTOPRAZOLE SODIUM, VIA: HCPCS | Performed by: INTERNAL MEDICINE

## 2024-01-31 PROCEDURE — 85027 COMPLETE CBC AUTOMATED: CPT

## 2024-01-31 PROCEDURE — 0DB68ZX EXCISION OF STOMACH, VIA NATURAL OR ARTIFICIAL OPENING ENDOSCOPIC, DIAGNOSTIC: ICD-10-PCS | Performed by: INTERNAL MEDICINE

## 2024-01-31 PROCEDURE — 99222 1ST HOSP IP/OBS MODERATE 55: CPT | Performed by: INTERNAL MEDICINE

## 2024-01-31 PROCEDURE — 84100 ASSAY OF PHOSPHORUS: CPT

## 2024-01-31 PROCEDURE — 85730 THROMBOPLASTIN TIME PARTIAL: CPT | Performed by: INTERNAL MEDICINE

## 2024-01-31 PROCEDURE — 94760 N-INVAS EAR/PLS OXIMETRY 1: CPT

## 2024-01-31 PROCEDURE — 43239 EGD BIOPSY SINGLE/MULTIPLE: CPT | Performed by: INTERNAL MEDICINE

## 2024-01-31 PROCEDURE — 80048 BASIC METABOLIC PNL TOTAL CA: CPT

## 2024-01-31 RX ORDER — SODIUM CHLORIDE, SODIUM LACTATE, POTASSIUM CHLORIDE, CALCIUM CHLORIDE 600; 310; 30; 20 MG/100ML; MG/100ML; MG/100ML; MG/100ML
INJECTION, SOLUTION INTRAVENOUS CONTINUOUS PRN
Status: DISCONTINUED | OUTPATIENT
Start: 2024-01-31 | End: 2024-01-31

## 2024-01-31 RX ORDER — LIDOCAINE HYDROCHLORIDE 10 MG/ML
INJECTION, SOLUTION EPIDURAL; INFILTRATION; INTRACAUDAL; PERINEURAL AS NEEDED
Status: DISCONTINUED | OUTPATIENT
Start: 2024-01-31 | End: 2024-01-31

## 2024-01-31 RX ORDER — ACETAMINOPHEN 325 MG/1
975 TABLET ORAL EVERY 8 HOURS PRN
Status: DISCONTINUED | OUTPATIENT
Start: 2024-01-31 | End: 2024-02-08 | Stop reason: HOSPADM

## 2024-01-31 RX ORDER — VANCOMYCIN HYDROCHLORIDE 1 G/200ML
15 INJECTION, SOLUTION INTRAVENOUS ONCE
Qty: 200 ML | Refills: 0 | Status: DISCONTINUED | OUTPATIENT
Start: 2024-01-31 | End: 2024-01-31

## 2024-01-31 RX ORDER — HYDRALAZINE HYDROCHLORIDE 20 MG/ML
5 INJECTION INTRAMUSCULAR; INTRAVENOUS EVERY 8 HOURS PRN
Status: DISCONTINUED | OUTPATIENT
Start: 2024-01-31 | End: 2024-02-01

## 2024-01-31 RX ORDER — SODIUM CHLORIDE, SODIUM GLUCONATE, SODIUM ACETATE, POTASSIUM CHLORIDE, MAGNESIUM CHLORIDE, SODIUM PHOSPHATE, DIBASIC, AND POTASSIUM PHOSPHATE .53; .5; .37; .037; .03; .012; .00082 G/100ML; G/100ML; G/100ML; G/100ML; G/100ML; G/100ML; G/100ML
75 INJECTION, SOLUTION INTRAVENOUS CONTINUOUS
Status: DISCONTINUED | OUTPATIENT
Start: 2024-01-31 | End: 2024-01-31

## 2024-01-31 RX ORDER — CLOPIDOGREL BISULFATE 75 MG/1
75 TABLET ORAL DAILY
Status: DISCONTINUED | OUTPATIENT
Start: 2024-02-01 | End: 2024-02-08 | Stop reason: HOSPADM

## 2024-01-31 RX ORDER — PANTOPRAZOLE SODIUM 40 MG/10ML
40 INJECTION, POWDER, LYOPHILIZED, FOR SOLUTION INTRAVENOUS EVERY 12 HOURS SCHEDULED
Status: DISCONTINUED | OUTPATIENT
Start: 2024-01-31 | End: 2024-02-02

## 2024-01-31 RX ORDER — HEPARIN SODIUM 5000 [USP'U]/ML
5000 INJECTION, SOLUTION INTRAVENOUS; SUBCUTANEOUS EVERY 8 HOURS SCHEDULED
Status: DISCONTINUED | OUTPATIENT
Start: 2024-01-31 | End: 2024-01-31

## 2024-01-31 RX ORDER — AMLODIPINE BESYLATE 5 MG/1
5 TABLET ORAL DAILY
Status: DISCONTINUED | OUTPATIENT
Start: 2024-01-31 | End: 2024-02-01

## 2024-01-31 RX ORDER — PROPOFOL 10 MG/ML
INJECTION, EMULSION INTRAVENOUS AS NEEDED
Status: DISCONTINUED | OUTPATIENT
Start: 2024-01-31 | End: 2024-01-31

## 2024-01-31 RX ORDER — LABETALOL HYDROCHLORIDE 5 MG/ML
10 INJECTION, SOLUTION INTRAVENOUS ONCE
Status: COMPLETED | OUTPATIENT
Start: 2024-01-31 | End: 2024-01-31

## 2024-01-31 RX ADMIN — AMLODIPINE BESYLATE 5 MG: 5 TABLET ORAL at 22:31

## 2024-01-31 RX ADMIN — CHLORHEXIDINE GLUCONATE 15 ML: 1.2 SOLUTION ORAL at 20:29

## 2024-01-31 RX ADMIN — AMPICILLIN SODIUM 2000 MG: 2 INJECTION, POWDER, FOR SOLUTION INTRAVENOUS at 20:35

## 2024-01-31 RX ADMIN — Medication 10 MG: at 20:26

## 2024-01-31 RX ADMIN — LIDOCAINE HYDROCHLORIDE 50 MG: 10 INJECTION, SOLUTION EPIDURAL; INFILTRATION; INTRACAUDAL; PERINEURAL at 15:30

## 2024-01-31 RX ADMIN — PROPOFOL 30 MG: 10 INJECTION, EMULSION INTRAVENOUS at 15:33

## 2024-01-31 RX ADMIN — ASPIRIN 81 MG CHEWABLE TABLET 81 MG: 81 TABLET CHEWABLE at 08:00

## 2024-01-31 RX ADMIN — LEVALBUTEROL HYDROCHLORIDE 1.25 MG: 1.25 SOLUTION RESPIRATORY (INHALATION) at 19:08

## 2024-01-31 RX ADMIN — SODIUM CHLORIDE, SODIUM LACTATE, POTASSIUM CHLORIDE, AND CALCIUM CHLORIDE: .6; .31; .03; .02 INJECTION, SOLUTION INTRAVENOUS at 15:22

## 2024-01-31 RX ADMIN — SODIUM CHLORIDE, SODIUM GLUCONATE, SODIUM ACETATE, POTASSIUM CHLORIDE, MAGNESIUM CHLORIDE, SODIUM PHOSPHATE, DIBASIC, AND POTASSIUM PHOSPHATE 75 ML/HR: .53; .5; .37; .037; .03; .012; .00082 INJECTION, SOLUTION INTRAVENOUS at 06:25

## 2024-01-31 RX ADMIN — HEPARIN SODIUM 6.1 UNITS/KG/HR: 10000 INJECTION, SOLUTION INTRAVENOUS at 02:55

## 2024-01-31 RX ADMIN — HYDRALAZINE HYDROCHLORIDE 5 MG: 20 INJECTION, SOLUTION INTRAMUSCULAR; INTRAVENOUS at 16:34

## 2024-01-31 RX ADMIN — CLOPIDOGREL BISULFATE 75 MG: 75 TABLET ORAL at 08:07

## 2024-01-31 RX ADMIN — IPRATROPIUM BROMIDE 0.5 MG: 0.5 SOLUTION RESPIRATORY (INHALATION) at 13:00

## 2024-01-31 RX ADMIN — ATORVASTATIN CALCIUM 80 MG: 40 TABLET, FILM COATED ORAL at 17:16

## 2024-01-31 RX ADMIN — HEPARIN SODIUM 5000 UNITS: 5000 INJECTION INTRAVENOUS; SUBCUTANEOUS at 08:07

## 2024-01-31 RX ADMIN — LEVALBUTEROL HYDROCHLORIDE 1.25 MG: 1.25 SOLUTION RESPIRATORY (INHALATION) at 08:44

## 2024-01-31 RX ADMIN — SODIUM CHLORIDE, SODIUM GLUCONATE, SODIUM ACETATE, POTASSIUM CHLORIDE, MAGNESIUM CHLORIDE, SODIUM PHOSPHATE, DIBASIC, AND POTASSIUM PHOSPHATE 75 ML/HR: .53; .5; .37; .037; .03; .012; .00082 INJECTION, SOLUTION INTRAVENOUS at 10:19

## 2024-01-31 RX ADMIN — PROPOFOL 80 MG: 10 INJECTION, EMULSION INTRAVENOUS at 15:30

## 2024-01-31 RX ADMIN — IPRATROPIUM BROMIDE 0.5 MG: 0.5 SOLUTION RESPIRATORY (INHALATION) at 19:08

## 2024-01-31 RX ADMIN — PANTOPRAZOLE SODIUM 40 MG: 40 INJECTION, POWDER, FOR SOLUTION INTRAVENOUS at 12:05

## 2024-01-31 RX ADMIN — PANTOPRAZOLE SODIUM 40 MG: 40 INJECTION, POWDER, FOR SOLUTION INTRAVENOUS at 20:35

## 2024-01-31 RX ADMIN — IPRATROPIUM BROMIDE 0.5 MG: 0.5 SOLUTION RESPIRATORY (INHALATION) at 08:44

## 2024-01-31 RX ADMIN — PROPOFOL 30 MG: 10 INJECTION, EMULSION INTRAVENOUS at 15:32

## 2024-01-31 RX ADMIN — LEVALBUTEROL HYDROCHLORIDE 1.25 MG: 1.25 SOLUTION RESPIRATORY (INHALATION) at 13:00

## 2024-01-31 RX ADMIN — Medication 6 MG: at 22:30

## 2024-01-31 RX ADMIN — VANCOMYCIN HYDROCHLORIDE 1000 MG: 1 INJECTION, SOLUTION INTRAVENOUS at 09:18

## 2024-01-31 RX ADMIN — AMPICILLIN SODIUM 2000 MG: 2 INJECTION, POWDER, FOR SOLUTION INTRAVENOUS at 12:12

## 2024-01-31 NOTE — QUICK NOTE
Discussed the findings and plan with patient's daughter. Answered all the questions to her satisfaction. Patient wants to go home sooner rather than later, explained to her that important workup is yet to be done, and we want to make sure and workup possible GI bleed, and also possible Type II MI, and rule out infectious endocarditis. Patient understands that she will have to stay in the hospital for now.

## 2024-01-31 NOTE — ASSESSMENT & PLAN NOTE
Initial sCr - 2.58  Unknown baseline sCr   Likely worsened in setting of severe sepsis, obstructing renal calculus  Received 500cc bolus in ED, additional fluids initially held given respiratory status, elevated BNP and concern for volume overload.    Maintain navas catheter  Strict I&O  Avoid nephrotoxins and hypotension  Monitor and replete electrolytes  Repeat BMP every day  Ordered gentle hydration which improved creatinine

## 2024-01-31 NOTE — ASSESSMENT & PLAN NOTE
Pt presented with tachypnea, increased WOB and audible expiratory wheezing, SpO2 88% on room air.  No known pulmonary history, briefly smoked cigarettes many years ago.  Spo2 improved on 2L NC but wheezes persist. Albuterol x1 given in ED with minimal improvement.   LS diminished with wheeze in upper airway, no stridor  CXR - no acute cardiopulmonary abnormalities.  CT chest - No acute findings, no consolidation, pneumothorax, pleural effusion  BNP - 2,737  Initially thought to be volume overload, but now concern for undiagnosed COPD    Plan:  Continue supplemental oxygen for goal SpO2 > 88%, given likely undiagnosed COPD, currently on 1-2L NC  Suspect component of undiagnosed sleep apnea, continue 2 L NC qhs, consider CPAP  Atrovent and Xopenex scheduled TID, patient feels improved  Encourage incentive spirometry

## 2024-01-31 NOTE — PROGRESS NOTES
Vancomycin IV Pharmacy-to-Dose Consultation     Vancomycin has been discontinued.  Pharmacy will sign off.  Please contact or re-consult with questions.    Dami Costa, Pharmacist

## 2024-01-31 NOTE — ASSESSMENT & PLAN NOTE
As evidenced by initial fever (100.7), tachycardia (110), tachypnea (26), leukocytosis (30), Lactate 3.8  Suspected source: Urosepsis, bacteremia  CT chest/ABD/pelvis - 6mm calculus in the right ureterovesical junction and moderate hydronephrosis. Signs of pyelonephritis  Procalcitonin - 230  Received 500cc resuscitation fluid in ED, not 30cc/kg given concerns for respiratory status as pt noted to be tachyneic, hypoxic and wheezing after receiving IVF  Blood cultures x 2 drawn - positive for E faecalis and E. coli  UA: +leukocytes, +WBC, occasional bacteria, negative nitrites  Received Vancomycin, Cefepime, Cipro in ED  Blood cultures positive for E coli and enterococcus faecalis, urine culture positive for gram negative rods    Plan:  Received 4 days of Vancomycin, 2 days of Cefepime, 2 days of Ceftriaxone  Currently on Day 2 of Ampicillin as current antibiotic regimen   LUC planned for today to evaluate for endocarditis given bacteremia  ID consulted, recommendations appreciated  Follow up initial and repeat blood cultures - sensitivities  Follow up urine cultures - sensitivities  Trend WBC, fever curve - improving

## 2024-01-31 NOTE — ANESTHESIA PREPROCEDURE EVALUATION
Procedure:  EGD  Admitted for obstructive uropathy to ICU s/p cysto and stent with +Bacteremia here for LUC for eval for IE. LUC cancelled patient had melena and cardiology wanted to eval with EGD prior to LUC.      Acute hypoxemic respiratory failure and morbid obesity BMI >50 had been on 2L NC am 1/30     TTE: LVEF 65% with G1DD  NSTEMI type II plan for cath prior to discharge   Relevant Problems   /RENAL   (+) RAKEL (acute kidney injury) (HCC)   (+) Obstructive pyelonephritis        Physical Exam    Airway    Mallampati score: II  TM Distance: >3 FB  Neck ROM: full     Dental    upper dentures,     Cardiovascular      Pulmonary      Other Findings  post-pubertal.      Anesthesia Plan  ASA Score- 4     Anesthesia Type- IV sedation with anesthesia with ASA Monitors.         Additional Monitors:     Airway Plan:            Plan Factors-Exercise tolerance (METS): >4 METS.    Chart reviewed. EKG reviewed.  Existing labs reviewed. Patient summary reviewed.    Patient is not a current smoker.      Obstructive sleep apnea risk education given perioperatively.        Induction-     Postoperative Plan-     Informed Consent- Anesthetic plan and risks discussed with patient.  I personally reviewed this patient with the CRNA. Discussed and agreed on the Anesthesia Plan with the CRNA..

## 2024-01-31 NOTE — ANESTHESIA POSTPROCEDURE EVALUATION
Post-Op Assessment Note    CV Status:  Stable  Pain Score: 0    Pain management: adequate       Mental Status:  Alert and awake   Hydration Status:  Euvolemic   PONV Controlled:  Controlled   Airway Patency:  Patent     Post Op Vitals Reviewed: Yes    No anethesia notable event occurred.    Staff: CRNA, Anesthesiologist               BP   184/86   Temp   98.5   Pulse  89   Resp   18   SpO2   95

## 2024-01-31 NOTE — MALNUTRITION/BMI
This medical record reflects one or more clinical indicators suggestive of morbid obesity.      BMI Findings:  Adult BMI Classifications: Morbid Obesity 50-59.9        Body mass index is 51.89 kg/m².     See Nutrition note dated 1/31/2024 for additional details.  Completed nutrition assessment is viewable in the nutrition documentation.

## 2024-01-31 NOTE — ANESTHESIA PREPROCEDURE EVALUATION
Procedure:  PRE-OP ONLY    Admitted for obstructive uropathy to ICU s/p cysto and stent with +Bacteremia here for LUC for eval for IE    Acute hypoxemic respiratory failure and morbid obesity BMI >50 had been on 2L NC am 1/30    TTE: LVEF 65% with G1DD  Relevant Problems   /RENAL   (+) RAKEL (acute kidney injury) (HCC)   (+) Obstructive pyelonephritis        Physical Exam    Airway       Dental       Cardiovascular      Pulmonary      Other Findings  post-pubertal.      Anesthesia Plan  ASA Score- 3     Anesthesia Type- IV sedation with anesthesia with ASA Monitors.         Additional Monitors:     Airway Plan:            Plan Factors-    Chart reviewed. EKG reviewed.  Existing labs reviewed. Patient summary reviewed.                  Induction-     Postoperative Plan-     Informed Consent-

## 2024-01-31 NOTE — PROGRESS NOTES
[facility]  Progress Note  Name: Loida Duarte I  MRN: [id number]  Unit/Bed#: ICU 01 I Date of Admission: 1/28/2024   Date of Service: 1/31/2024 I Hospital Day: 2    Assessment/Plan   * Severe sepsis (HCC)  Assessment & Plan  As evidenced by initial fever (100.7), tachycardia (110), tachypnea (26), leukocytosis (30), Lactate 3.8  Suspected source: Urosepsis, bacteremia  CT chest/ABD/pelvis - 6mm calculus in the right ureterovesical junction and moderate hydronephrosis. Signs of pyelonephritis  Procalcitonin - 230  Received 500cc resuscitation fluid in ED, not 30cc/kg given concerns for respiratory status as pt noted to be tachyneic, hypoxic and wheezing after receiving IVF  Blood cultures x 2 drawn - positive for E faecalis and E. coli  UA: +leukocytes, +WBC, occasional bacteria, negative nitrites  Received Vancomycin, Cefepime, Cipro in ED  Blood cultures positive for E coli and enterococcus faecalis, urine culture positive for gram negative rods    Plan:  Continue ABX: Ceftriaxone, Vanco. Plan to narrow down to Ampicillin and Ceftriaxone  LUC planned for today  ID consulted, recommendations appreciated  Follow up initial and repeat blood cultures - sensitivities  Follow up urine cultures - sensitivities  Trend WBC, fever curve - improving    Elevated troponin  Assessment & Plan  Pt noted to be SOB, wheezing, tachypnea. Endorsed chest pain while in OR.  hsTroponin elevated  BNP - 2,737  CXR unremarkable  Cardiac echo showed EF 65%, and grade 1 diastolic dysfunction  Concern for Type II MI per cardiology    Plan:  Cardiology consulted, appreciate recommendations  If repeat troponin has positive delta change rec: ASA/Plavix. Heparin gtt finished after 48 hours  Cardiac cath during this hospitalization, after kidney function improves  Continue to trend hsTroponins  Continue supplemental oxygen    Acute respiratory insufficiency  Assessment & Plan  Pt presented with tachypnea, increased WOB  "and audible expiratory wheezing, SpO2 88% on room air.  No known pulmonary history, briefly smoked cigarettes many years ago.  Spo2 improved on 2L NC but wheezes persist. Albutrol x1 given in ED with minimal improvement.   LS diminished with wheeze in upper airway, no stridor  CXR - no acute cardiopulmonary abnormalities.  CT chest - No acute findings, no consolidation, pneumothorax, pleural effusion  BNP - 2,737  Initially thought to be volume overload, but now concern for undiagnosed COPD    Plan:  Continue supplemental oxygen for goal SpO2 > 92%, currently on 1-2L NC  Atrovent and Xopenex scheduled TID, patient feels improved    Right ureteral calculus  Assessment & Plan  CT chest/ABD/pelvis - CT chest/ABD/pelvis - 6mm calculus in the right ureterovesical junction and moderate hydronephrosis.  Urology consulted in ED and patient was taken to OR for cysto with stent placement  Continue ABX, see plan above  Maintain navas catheter  Monitor I&O  Monitor renal function    RAKEL (acute kidney injury) (HCC)  Assessment & Plan  Initial sCr - 2.58  Unknown baseline sCr   Likely worsened in setting of severe sepsis, obstructing renal calculus  Received 500cc bolus in ED, additional fluids initially held given respiratory status, elevated BNP and concern for volume overload.    Maintain navas catheter  Strict I&O  Avoid nephrotoxins and hypotension  Monitor and replete electrolytes  Repeat BMP every day  Ordered gentle hydration which improved creatinine    Obstructive pyelonephritis  Assessment & Plan  Patient had right-sided ureterovesical calculus and hydronephrosis  Patient had urology consulted. Cystoscopy ureteroscopy and stent placement               Chief Complaint: \"I guess I'm feeling better\"    HPI/24hr events: No overnight events    Physical Exam:     Physical Exam  Vitals and nursing note reviewed.   Constitutional:       General: She is not in acute distress.     Appearance: She is well-developed.   HENT:      " "Head: Normocephalic and atraumatic.   Eyes:      Conjunctiva/sclera: Conjunctivae normal.   Cardiovascular:      Rate and Rhythm: Normal rate and regular rhythm.      Heart sounds: No murmur heard.  Pulmonary:      Effort: Pulmonary effort is normal. No respiratory distress.      Breath sounds: No wheezing.   Abdominal:      Palpations: Abdomen is soft.      Tenderness: There is no abdominal tenderness.   Musculoskeletal:         General: No swelling.      Cervical back: Neck supple.   Skin:     General: Skin is warm and dry.      Capillary Refill: Capillary refill takes less than 2 seconds.   Neurological:      Mental Status: She is alert.   Psychiatric:         Mood and Affect: Mood normal.           Vitals:    24 0400 24 0500 24 0537 24 0600   BP:       BP Location:       Pulse: 71 70  71   Resp: (!) 24 19  (!) 23   Temp:       TempSrc:       SpO2: 97% 95%  95%   Weight:   109 kg (239 lb 13.8 oz)    Height:                 Temperature:   Temp (24hrs), Av.2 °F (36.8 °C), Min:98 °F (36.7 °C), Max:98.7 °F (37.1 °C)    Current: Temperature: 98.7 °F (37.1 °C)    Weights:   IBW (Ideal Body Weight): 38.62 kg    Body mass index is 51.89 kg/m².  Weight (last 2 days)       Date/Time Weight    24 0537 109 (239.86)    24 0600 109 (239.86)    24 0212 109 (239.86)    24 0931 109 (240)    24 0209 109 (240.96)    24 0041 109 (240.96)            Hemodynamic Monitoring:  N/A     Non-Invasive/Invasive Ventilation Settings:  Respiratory      Lab Data (Last 4 hours)      None           O2/Vent Data (Last 4 hours)      None                  No results found for: \"PHART\", \"QMH7MXC\", \"PO2ART\", \"KUI2GIU\", \"R9IBRKGF\", \"BEART\", \"SOURCE\"  SpO2: SpO2: 95 %    Intake and Outputs:  I/O          07 0700  0701   0700    P.O. 120     I.V. (mL/kg) 136.9 (1.3) 652.9 (6)    IV Piggyback 60 350    Total Intake(mL/kg) 316.9 (2.9) 1002.9 (9.2)    Urine (mL/kg/hr) " 1250 (0.5) 1200 (0.5)    Total Output 1250 1200    Net -933.1 -197.1                UOP: 50/hour   Nutrition:        Diet Orders   (From admission, onward)                 Start     Ordered    01/31/24 0001  Diet NPO  Diet effective midnight        References:    Adult Nutrition Support Algorithm    RD Therapeutic Diet Order Protocol   Question Answer Comment   Diet Type NPO    RD to adjust diet per protocol? Yes        01/30/24 0652                    Labs:   Results from last 7 days   Lab Units 01/31/24  0508 01/30/24  0615 01/29/24  0543   WBC Thousand/uL 22.74* 30.35* 25.47*   HEMOGLOBIN g/dL 12.3 13.5 12.8   HEMATOCRIT % 38.4 41.0 39.1   PLATELETS Thousands/uL 92* 103* 121*   NEUTROS PCT %  --   --  86*   MONOS PCT %  --   --  5   MONO PCT % 5 2*  --    EOS PCT % 0 0 0      Results from last 7 days   Lab Units 01/31/24  0508 01/30/24  1802 01/30/24  0615 01/29/24  0543 01/29/24  0210 01/28/24  2126   SODIUM mmol/L 134* 132* 133* 134*   < > 135   POTASSIUM mmol/L 4.5 4.6 4.0 4.6   < > 3.7   CHLORIDE mmol/L 103 101 101 100   < > 99   CO2 mmol/L 22 20* 22 23   < > 23   BUN mg/dL 57* 57* 54* 40*   < > 34*   CREATININE mg/dL 2.26* 2.49* 2.89* 2.80*   < > 2.58*   CALCIUM mg/dL 8.5 8.8 8.6 7.9*   < > 8.8   ALK PHOS U/L  --   --   --  60  --  69   ALT U/L  --   --   --  16  --  11   AST U/L  --   --   --  64*  --  46*    < > = values in this interval not displayed.     Results from last 7 days   Lab Units 01/31/24  0508 01/30/24  0615 01/29/24  0543   MAGNESIUM mg/dL 2.2 2.3 2.2     Results from last 7 days   Lab Units 01/31/24  0508 01/30/24  0615 01/29/24  0543   PHOSPHORUS mg/dL 3.4 3.9 4.3*      Results from last 7 days   Lab Units 01/31/24  0341 01/30/24 2128 01/30/24  1332 01/29/24  1407 01/29/24  0748 01/28/24 2126   INR   --   --   --   --  1.43* 1.22*   PTT seconds 75* 53* 63*   < > 45* 36    < > = values in this interval not displayed.     Results from last 7 days   Lab Units 01/29/24  0410   LACTIC ACID  "mmol/L 1.9     No results found for: \"TROPONINI\"    Imaging:     FL retrograde pyelogram   Final Result by Clint Yanez MD (01/29 7352)      Fluoroscopic guidance provided for right retrograde pyelogram. Please see procedure report for further details.      Workstation performed: [id number]         CT chest abdomen pelvis wo contrast   ED Interpretation by Fred Espinoza MD (01/29 0010)   Read on AD.    CT Chest  IMPRESSION:   1. No acute findings in the chest.   2. For details regarding the abdominal and pelvic findings, refer to the CT abdomen and pelvis report below      CT Abd/Pel  IMPRESSION:   1. 6 mm calculus in the right ureterovesical junction and moderate right hydroureteronephrosis.    2. Cholelithiasis without CT evidence for cholecystitis.   3. Colonic diverticulosis without evidence for diverticulitis.   4. For details regarding the chest findings, refer to the CT chest report above.         Final Result by Marisa Pinon MD (01/29 1950)   Trace right pleural fluid.   Moderate cardiomegaly.   6 mm right UVJ calculus. Moderate right hydroureteronephrosis. Perinephric and periureteral stranding.   Cholelithiasis. No radiographic evidence of cholecystitis.   Colonic diverticulosis.   Other findings, as per the body of the report.         Preliminary report from [facility] was provided on 1/28/2024 at 11:35 p.m.         Workstation performed: [id number]         XR chest 1 view portable   ED Interpretation by Fausto Strickland DO (01/28 9571)   Cardiomegaly, no other acute dz      Final Result by Tereso Briggs MD (01/29 1303)      Enlarged cardiomediastinal silhouette. No apical pleural capping.                  Workstation performed: [id number]            I have personally reviewed pertinent reports.        Micro:  Lab Results   Component Value Date    BLOODCX Received in Microbiology Lab. Culture in Progress. 01/30/2024    BLOODCX Received in Microbiology Lab. Culture in Progress. 01/30/2024 "    BLOODCX Escherichia coli (A) 01/28/2024    BLOODCX Enterococcus faecalis (A) 01/28/2024    URINECX >100,000 cfu/ml Gram Negative Niels (A) 01/28/2024       Allergies: No Known Allergies    Medications:   Scheduled Meds:  Current Facility-Administered Medications   Medication Dose Route Frequency Provider Last Rate    acetaminophen  975 mg Oral Q8H PRN Luiz Calle MD      albuterol  2 puff Inhalation PRN Noreen Meredith PA-C      aspirin  81 mg Oral Daily Luiz Calle MD      atorvastatin  80 mg Oral QPM Luiz Calle MD      cefTRIAXone  2,000 mg Intravenous Q24H Noreen Meredith PA-C Stopped (01/30/24 1714)    chlorhexidine  15 mL Mouth/Throat Q12H [facility] Barrera Vargas MD      clopidogrel  75 mg Oral Daily Luiz Calle MD      diphenhydrAMINE (BENADRYL) 25 mg in sodium chloride 0.9 % 50 mL IVPB  25 mg Intravenous Q6H PRN Noreen Meredith PA-C      EPINEPHrine PF  0.3 mg Intramuscular Once PRN Noreen Meredith PA-C      famotidine  20 mg Intravenous Once PRN Noreen Meredith PA-C      heparin (porcine)  3-20 Units/kg/hr (Order-Specific) Intravenous Titrated Pablo Hagan MD 6.1 Units/kg/hr (01/31/24 0255)    heparin (porcine)  2,000 Units Intravenous Q6H PRN Luiz Calle MD      heparin (porcine)  4,000 Units Intravenous Q6H PRN Luiz Calle MD      heparin (porcine)  5,000 Units Subcutaneous Q8H [facility] Luiz Calle MD      ipratropium  0.5 mg Nebulization TID Luiz Calle MD      levalbuterol  1.25 mg Nebulization TID Phylicia Meade DO      melatonin  6 mg Oral HS MARLENE Villarreal      multi-electrolyte  75 mL/hr Intravenous Continuous Luiz Calle MD 75 mL/hr (01/31/24 0625)    trimethobenzamide  200 mg Intramuscular Q6H PRN Luiz Calle MD      vancomycin  15 mg/kg (Adjusted) Intravenous Daily PRN Barrera Vargas MD       Continuous Infusions:heparin (porcine), 3-20 Units/kg/hr (Order-Specific), Last  "Rate: 6.1 Units/kg/hr (01/31/24 0255)  multi-electrolyte, 75 mL/hr, Last Rate: 75 mL/hr (01/31/24 0625)      PRN Meds:  acetaminophen, 975 mg, Q8H PRN  albuterol, 2 puff, PRN  diphenhydrAMINE (BENADRYL) 25 mg in sodium chloride 0.9 % 50 mL IVPB, 25 mg, Q6H PRN  EPINEPHrine PF, 0.3 mg, Once PRN  famotidine, 20 mg, Once PRN  heparin (porcine), 2,000 Units, Q6H PRN  heparin (porcine), 4,000 Units, Q6H PRN  trimethobenzamide, 200 mg, Q6H PRN  vancomycin, 15 mg/kg (Adjusted), Daily PRN        VTE Pharmacologic Prophylaxis: Heparin  VTE Mechanical Prophylaxis: sequential compression device    Invasive lines and devices:  Invasive Devices       Peripheral Intravenous Line  Duration             Peripheral IV 01/28/24 Left Antecubital 2 days    Peripheral IV 01/29/24 Left;Ventral (anterior) Forearm 1 day    Peripheral IV 01/30/24 Right;Upper;Ventral (anterior) Arm <1 day              Drain  Duration             Urethral Catheter Latex;Double-lumen 18 Fr. 2 days                         Code Status: Level 3 - DNAR and DNI     Portions of the record may have been created with voice recognition software.  Occasional wrong word or \"sound a like\" substitutions may have occurred due to the inherent limitations of voice recognition software.  Read the chart carefully and recognize, using context, where substitutions have occurred.     Luiz Calle MD          "

## 2024-01-31 NOTE — PROGRESS NOTES
Progress Note - Infectious Disease   Loida Duarte 78 y.o. female MRN: [id number]  Unit/Bed#: ICU 01 Encounter: [id number]      Impression/Plan:  Sepsis. POA. E/b fever to 100.7F, tachycardia, leukocytosis to 27, and lactic acidosis on admission. Most likely I/s/o #2, #3, and #4. CT C/A/P on admission showed a 6mm right distal ureteral calculus with right-sided hydronephrosis with perinephric and periureteral stranding. CT chest did not show any evidence of consolidation. Moderate cardiomegaly noted. COVID/Flu/RSV negative. UA obtained with innumerable WBC, large LE, and moderate blood c/f urinary source. Remains afebrile and HDS. WBC improved from 30 to 22 today. No new symptoms per patient. Urine culture from straight cath resulted positive for E.coli, Ucx from cystoscopy positive for E.faecalis. Now with c/f GI bleed. GI consulted for EGD.  Antibiotics as below  Follow-up surveillance blood cultures from 1/30 to assess for culture clearance  Serial examinations  Follow-up EGD findings.   Monitor temperature/vitals while on IV antibiotics  O2 management per critical care team  Other supportive care per critical care team  Monitoring for potential antimicrobial toxicity by following CBCD and CMP     Polymicrobial bacteremia. Blood cultures on admission positive both sets (2/2) for GNRs and GPCs in pairs identified as E.coli and E.faecalis. Most likely etiology #3 and #4. TTE obtained showed moderate thickening of the mitral valve. Favor LUC to further assess for endocarditis. Patient passed amoxicillin challenge and was narrowed from vancomycin/CTX to ampicillin. Continues to tolerate. LUC currently being held given concern for GI bleed. Plan for EGD today with GI.    Continue IV ampicillin 2g q8h   Follow-up surveillance blood cultures from 1/30 to assess for culture clearance  Follow-up LUC (once cleared) to assess for endocarditis  Monitoring for potential antimicrobial toxicity by following CBCD and CMP      UTI complicated by pyelonephritis. Likely I/s/o #4. Likely etiology of #2. UA obtained in ED with innumerable WBC, large leukocytes, and moderate blood. CT A/P on admission showed #4 and perinephric and periureteral stranding. Ucx from straight cath resulted positive for E.coli, Ucx from cystoscopy positive for E.faecalis.   Antibiotics as above  Monitoring for potential antimicrobial toxicity by following CBCD and CMP     Right distal ureteral calculus c/b right-sided hydronephrosis. Likely etiology of #3. CT A/P on admission showed a 6mm right UVJ calculus. Now s/p OR on 1/29 for right ureteral stent placement with Urology.   Close urology follow-up.     Elevated troponin. Cardiology consulted. Suspected nonischemic myocardial injury in setting of sepsis. Started on IV Heparin, ASA, statin, and Plavix.   Management per cardiology     RAKEL. Scr elevated to 2.8 on admission. Unknown baseline as patient has not seen a doctor in ~50 years. Consider pre-renal I/s/o sepsis vs obstructive I/s/o renal calculus.   Dose adjust antibiotics as needed  Continue to trend Scr  Diuresis per primary team     Acute hypoxic respiratory failure. POA. Noted to be 88% on room air. Also with diffuse wheezing on exam. CT chest demonstrated no acute pulmonary abnormalities. Showed cardiomegaly. Currently requiring 5L NC. Consider pulmonary edema.   Continue to monitor respiratory status  O2 management per primary team   Low concern for pneumonia at this time     Penicillin allergy- resolved. Had an allergy listed as swelling. States she had a rash as a child, but has not had it since. Has not seen a doctor in 50 years. Passed an amoxicillin challenge on 1/30. Tolerating ampicillin without difficulty.   No longer a true allergy      Morbid obesity. BMI noted to be 51 on admission. May affect antibiotic dosing.     Above plan was discussed in detail with patient at bedside.   Above plan was discussed in detail with critical care resident,    who agrees with the above plan to continue ampicillin, follow-up LUC once cleared.     Antibiotics:  Ampicillin D#1    Antibiotic D#4    Subjective:  Patient reports that she has some right sided lower abdominal pressure. States that she cannot urinate, even though she has a navas in place. Denies having any other new symptoms. Patient with dark stools and positive occult testing concerning for GI bleed. Cardiology canceled LUC for today. GI planning for EGD. Patient denies having fevers or chills. Also denies nausea, vomiting, diarrhea, CP. Reports ongoing SOB and increased work of breathing. Denies acute chest pain. Tolerating ampicillin. No rashes. Patient states she is hungry because she cannot eat before her procedure. Daughter and grandson at bedside.     Objective:  Vitals:  Temp:  [98 °F (36.7 °C)-98.8 °F (37.1 °C)] 98.8 °F (37.1 °C)  HR:  [68-81] 74  Resp:  [18-24] 22  BP: (130-187)/(65-86) 156/65  SpO2:  [90 %-97 %] 90 %  Temp (24hrs), Av.4 °F (36.9 °C), Min:98 °F (36.7 °C), Max:98.8 °F (37.1 °C)  Current: Temperature: 98.8 °F (37.1 °C)    Physical Exam:   General Appearance:  Chronically ill appearing, NAD.   Throat: Oropharynx moist without lesions.    Lungs:   Audible wheezes, otherwise grossly clear to auscultation bilaterally, increased work of breathing   Heart:  RRR; no murmur, rub or gallop.   Abdomen:   Soft, non-tender, non-distended, positive bowel sounds.     : Navas in place, yellow urine. No gross hematuria noted.    Extremities: No clubbing or cyanosis. +peripheral edema   Skin: No new rashes, lesions, or draining wounds noted on exposed skin.     Labs, Imaging, & Other studies:   All pertinent labs and imaging studies were personally reviewed  Results from last 7 days   Lab Units 24  0508 24  0615 24  0543   WBC Thousand/uL 22.74* 30.35* 25.47*   HEMOGLOBIN g/dL 12.3 13.5 12.8   PLATELETS Thousands/uL 92* 103* 121*     Results from last 7 days    Lab Units 01/31/24  0508 01/30/24  0615 01/29/24  0543 01/29/24  0210 01/28/24 2126   POTASSIUM mmol/L 4.5   < > 4.6   < > 3.7   CHLORIDE mmol/L 103   < > 100   < > 99   CO2 mmol/L 22   < > 23   < > 23   BUN mg/dL 57*   < > 40*   < > 34*   CREATININE mg/dL 2.26*   < > 2.80*   < > 2.58*   EGFR ml/min/1.73sq m 19   < > 15   < > 16   CALCIUM mg/dL 8.5   < > 7.9*   < > 8.8   AST U/L  --   --  64*  --  46*   ALT U/L  --   --  16  --  11   ALK PHOS U/L  --   --  60  --  69    < > = values in this interval not displayed.     Results from last 7 days   Lab Units 01/30/24  0909 01/30/24  0836 01/29/24  1046 01/29/24  0154 01/28/24  2230 01/28/24 2131 01/28/24 2126   BLOOD CULTURE  Received in Microbiology Lab. Culture in Progress. Received in Microbiology Lab. Culture in Progress.  --   --   --  Escherichia coli*  Enterococcus faecalis* Escherichia coli*  Enterococcus faecalis*   GRAM STAIN RESULT   --   --   --   --   --  Gram negative rods*  Gram positive cocci in pairs* Gram negative rods*  Gram positive cocci in pairs*   URINE CULTURE   --   --   --  10,000-19,000 cfu/ml Enterococcus faecalis* >100,000 cfu/ml Escherichia coli*  --   --    MRSA CULTURE ONLY   --   --  No Methicillin Resistant Staphlyococcus aureus (MRSA) isolated  --   --   --   --      Results from last 7 days   Lab Units 01/28/24 2126   PROCALCITONIN ng/ml 230.37*                     Imaging Studies:  No new imaging studies for review       Noreen Meredith PA-C  [facility]

## 2024-01-31 NOTE — ASSESSMENT & PLAN NOTE
Pt presented with tachypnea, increased WOB and audible expiratory wheezing, SpO2 88% on room air.  No known pulmonary history, briefly smoked cigarettes many years ago.  Spo2 improved on 2L NC but wheezes persist. Albutrol x1 given in ED with minimal improvement.   LS diminished with wheeze in upper airway, no stridor  CXR - no acute cardiopulmonary abnormalities.  CT chest - No acute findings, no consolidation, pneumothorax, pleural effusion  BNP - 2,737  Most likely volume overload    Plan:  Continue supplemental oxygen for goal SpO2 > 92%, currently on 5L NC  Check ABG  Xopenex as needed for wheezing

## 2024-01-31 NOTE — CONSULTS
Consultation -  [facility]  Loida Duarte 78 y.o. female MRN: [id number]  Unit/Bed#: ICU 01 Encounter: [id number]        Inpatient consult to gastroenterology  Consult performed by: Luci Chakraborty PA-C  Consult ordered by: Kinjal Marsh DO          Reason for Consult / Principal Problem: Dark stools    ASSESSMENT and PLAN:      78-year-old female with no recent healthcare maintenance for 50 years who presented with wheezing, nausea and vomiting found to have severe sepsis with bacteremia secondary to pyelonephritis, type II MI, plan for LUC today however this was canceled in the setting of dark stools and positive occult testing. Hgb 12.    Dark stools with positive stool occult  Patient was planned for LUC today however cardiology canceled in the setting of dark stools with recommendations for EGD first.  Patient reports having episode of vomiting prior to arrival which appeared dark however she attributed this to eating black licorice.  She denies any abdominal pain.  She has been taking Advil 2 to 4 pills daily for the past 1 to 2 weeks.  No prior EGD.  No PPI medication.  Rule out peptic ulcer disease, AVM, malignancy.    -Spoke with ICU and cardiology team, they are holding off on LUC due to dark stools which they would prefer to be evaluated first.  Will plan for EGD today.  - Patient is on Plavix with last dose this morning, we will plan for diagnostic EGD.  -Start Protonix twice daily.  Further recommendations based on EGD results.  - Monitor hemoglobin and transfuse as needed per primary team.  - Monitor stool output.  -Patient has also not had any prior colonoscopy.  Will further discuss based on EGD.  -I spoke with patients son on the phone while in the room with patient. I attempted to call him again for update without answer, left voicemail.    Spoke with ICU and cardiology team about  plan.  -------------------------------------------------------------------------------------------------------------------    HPI:     Loida Duarte is a 78-year-old female with no known past medical history who has not seen a doctor in over 50 years who presented to the emergency room 1/28 for wheezing, nausea, vomiting found to have severe sepsis with polymicrobial bacteremia, elevated troponin likely in the setting of type II MI with GI consulted due to dark stools.  Hemoglobin normal at 12.  Stool occult testing was checked and was positive.  Patient was planned for LUC today however procedure was canceled in the setting of dark stools.  Cardiology requesting evaluation with EGD first.    Patient reports prior to arrival having episodes of nausea, vomiting and diarrhea.  She reports the emesis appeared dark however she attributed this to eating licorice.  She reports her bowel movements have been normal at home without any melena or bright red blood per rectum.  She denies having any abdominal pain.  She does report taking Advil around 2 pills 1-2 times a day for the past 1 to 2 weeks due to headaches.  She denies any acid reflux or PPI medications.    She denies any prior abdominal surgeries.    No prior EGD or colonoscopy.    Patient was started on plavix with last dose this morning.    REVIEW OF SYSTEMS:    CONSTITUTIONAL: Nausea and vomiting which has resolved.  Denies any unintentional weight loss.  Reports appetite has been normal.  HEENT: No earache or tinnitus. Denies hearing loss or visual disturbances.  CARDIOVASCULAR: No chest pain or palpitations.   RESPIRATORY: + Wheezing, improved  GASTROINTESTINAL: As noted in the History of Present Illness.   GENITOURINARY: No problems with urination. Denies any hematuria or dysuria.  NEUROLOGIC: +headaches  MUSCULOSKELETAL: Denies any muscle or joint pain.   SKIN: Denies skin rashes or itching.   ENDOCRINE: Denies excessive thirst. Denies intolerance to heat or  cold.  PSYCHOSOCIAL: Denies depression or anxiety. Denies any recent memory loss.       Historical Information   History reviewed. No pertinent past medical history.  Past Surgical History:   Procedure Laterality Date    FL RETROGRADE PYELOGRAM  1/29/2024    AK CYSTO BLADDER W/URETERAL CATHETERIZATION Right 1/29/2024    Procedure: CYSTOSCOPY RETROGRADE PYELOGRAM WITH INSERTION STENT URETERAL;  Surgeon: Barrera Vargas MD;  Location: [facility];  Service: Urology     Social History   Social History     Substance and Sexual Activity   Alcohol Use Never     Social History     Substance and Sexual Activity   Drug Use Never     Social History     Tobacco Use   Smoking Status Former    Types: Cigarettes   Smokeless Tobacco Never     History reviewed. No pertinent family history.    Meds/Allergies     No medications prior to admission.     Current Facility-Administered Medications   Medication Dose Route Frequency    acetaminophen (TYLENOL) tablet 975 mg  975 mg Oral Q8H PRN    albuterol (PROVENTIL HFA,VENTOLIN HFA) inhaler 2 puff  2 puff Inhalation PRN    ampicillin (OMNIPEN) 2,000 mg in sodium chloride 0.9 % 100 mL IVPB  2,000 mg Intravenous Q8H    atorvastatin (LIPITOR) tablet 80 mg  80 mg Oral QPM    chlorhexidine (PERIDEX) 0.12 % oral rinse 15 mL  15 mL Mouth/Throat Q12H GUNJAN    diphenhydrAMINE (BENADRYL) 25 mg in sodium chloride 0.9 % 50 mL IVPB  25 mg Intravenous Q6H PRN    EPINEPHrine PF (ADRENALIN) 1 mg/mL injection 0.3 mg  0.3 mg Intramuscular Once PRN    Famotidine (PF) (PEPCID) injection 20 mg  20 mg Intravenous Once PRN    ipratropium (ATROVENT) 0.02 % inhalation solution 0.5 mg  0.5 mg Nebulization TID    levalbuterol (XOPENEX) inhalation solution 1.25 mg  1.25 mg Nebulization TID    melatonin tablet 6 mg  6 mg Oral HS    multi-electrolyte (PLASMALYTE-A/ISOLYTE-S PH 7.4) IV solution  75 mL/hr Intravenous Continuous    trimethobenzamide (TIGAN) IM injection 200 mg  200 mg Intramuscular Q6H PRN       No  "Known Allergies        Objective     Blood pressure 156/65, pulse 74, temperature 98.8 °F (37.1 °C), temperature source Oral, resp. rate 22, height 4' 9.01\" (1.448 m), weight 109 kg (239 lb 13.8 oz), SpO2 90%.      Intake/Output Summary (Last 24 hours) at 1/31/2024 1130  Last data filed at 1/31/2024 0600  Gross per 24 hour   Intake 642.78 ml   Output 1100 ml   Net -457.22 ml         PHYSICAL EXAM:      General Appearance:   Patient sleeping prior to my arrival.  She awakens easily.  She is alert and oriented.  No distress.  Obese.   HEENT:   Normocephalic, atraumatic, anicteric.   Neck:  Supple, symmetrical, trachea midline, no adenopathy;    thyroid: no enlargement/tenderness/nodules; no carotid  bruit or JVD    Lungs:   Clear to auscultation bilaterally; no rales, rhonchi or wheezing; respirations unlabored    Heart::   S1 and S2 normal; regular rate and rhythm; no murmur, rub, or gallop.   Abdomen:   Soft, non-tender, non-distended; normal bowel sounds; no masses, no organomegaly.  Benign abdomen.   Genitalia:   Deferred    Rectal:   Deferred    Extremities:  No cyanosis, clubbing or edema    Pulses:  2+ and symmetric all extremities    Skin:  Skin color, texture, turgor normal, no rashes or lesions    Lymph nodes:  No palpable cervical, axillary or inguinal lymphadenopathy        Lab Results:   Results from last 7 days   Lab Units 01/31/24  0508 01/30/24  0615 01/29/24  0543   WBC Thousand/uL 22.74*   < > 25.47*   HEMOGLOBIN g/dL 12.3   < > 12.8   HEMATOCRIT % 38.4   < > 39.1   PLATELETS Thousands/uL 92*   < > 121*   NEUTROS PCT %  --   --  86*   LYMPHS PCT %  --   --  2*   LYMPHO PCT % 2*   < >  --    MONOS PCT %  --   --  5   MONO PCT % 5   < >  --    EOS PCT % 0   < > 0    < > = values in this interval not displayed.     Results from last 7 days   Lab Units 01/31/24  0508 01/30/24  0615 01/29/24  0543   POTASSIUM mmol/L 4.5   < > 4.6   CHLORIDE mmol/L 103   < > 100   CO2 mmol/L 22   < > 23   BUN mg/dL 57*   < " > 40*   CREATININE mg/dL 2.26*   < > 2.80*   CALCIUM mg/dL 8.5   < > 7.9*   ALK PHOS U/L  --   --  60   ALT U/L  --   --  16   AST U/L  --   --  64*    < > = values in this interval not displayed.     Results from last 7 days   Lab Units 01/29/24  0748   INR  1.43*           Imaging Studies: I have personally reviewed pertinent imaging studies.    FL retrograde pyelogram    Result Date: 1/29/2024  Impression: Fluoroscopic guidance provided for right retrograde pyelogram. Please see procedure report for further details. Workstation performed: [id number]     XR chest 1 view portable    Result Date: 1/29/2024  Impression: Enlarged cardiomediastinal silhouette. No apical pleural capping. Workstation performed: [id number]     CT chest abdomen pelvis wo contrast    Result Date: 1/29/2024  Impression: Trace right pleural fluid. Moderate cardiomegaly. 6 mm right UVJ calculus. Moderate right hydroureteronephrosis. Perinephric and periureteral stranding. Cholelithiasis. No radiographic evidence of cholecystitis. Colonic diverticulosis. Other findings, as per the body of the report. Preliminary report from [facility] was provided on 1/28/2024 at 11:35 p.m. Workstation performed: [id number]           Patient was seen and examined by Dr. Hernandez. All mares medical decisions were made by Dr. Hernandez. Thank you for allowing us to participate in the care of this present patient. We will follow-up with you closely.

## 2024-01-31 NOTE — ASSESSMENT & PLAN NOTE
Pt noted to be SOB, wheezing, tachypnea. Endorsed chest pain while in OR.  hsTroponin elevated  BNP - 2,737  CXR unremarkable  Cardiac echo 1/29 showed normal EF 65%, and grade 1 diastolic dysfunction with hypokinesis of basal inferoseptal and inferior walls   Concern for Type II MI per cardiology secondary to sepsis/hypoxia, although there is concern for CAD and she will eventually need cath    Plan:  Cardiology consulted, appreciate recommendations  Continue ASA/Plavix. Heparin gtt finished after 48 hours  Cardiac cath during this hospitalization, after kidney function improves  Continue to trend hsTroponins  Continue supplemental oxygen

## 2024-01-31 NOTE — PROGRESS NOTES
Loida Duarte is a 78 y.o. female who is currently ordered Vancomycin IV with management by the Pharmacy Consult service.  Relevant clinical data and objective / subjective history reviewed.  Vancomycin Assessment:  Indication and Goal AUC/Trough: Urinary tract infection (goal -600, trough >10); Bacteremia (goal -600, trough >10)  Clinical Status:  critical care   Micro:     Renal Function:  SCr: 2.26 mg/dL  CrCl: 22.2 mL/min  Renal replacement: Not on dialysis  Days of Therapy: 3  Current Dose: 1000 mg IV as needed for random level <15  Vancomycin Plan:  New Dosing: continue pulse dosing  Next Level: 02/01 at 0600  Renal Function Monitoring: Daily BMP and UOP  Pharmacy will continue to follow closely for s/sx of nephrotoxicity, infusion reactions and appropriateness of therapy.  BMP and CBC will be ordered per protocol. We will continue to follow the patient’s culture results and clinical progress daily.    Dami Costa, Pharmacist

## 2024-01-31 NOTE — PROGRESS NOTES
"General Cardiology   Progress Note -  Team One   Loida Duarte 78 y.o. female MRN: [id number]  Unit/Bed#: ICU 01 Encounter: [id number]    Assessment     Elevated troponin- likely type 2 MI in setting of sepsis and acute hypoxic respiratory failure  No c/o chest pain on admission. Admits to random episodes \"when my  stresses me out\"  Hs troponin trend: 14,797-->11,339-->12,709  ECG- sinus tachycardia with RBBB  TTE LVEF 65% with inferior wall hypokinesis, normal RV size/function, moderate mitral valve thickening with mild MR, and mild TR  CT notes coronary artery calcifications  Started on IV Heparin, ASA, statin, and Plavix  There was some concern for coffee ground emesis PTA but she also was eating black licorice which is what she thinks caused it. No evidence of acute bleeding initially but now with + FOBT so ASA, plavix, and IV heparin d/c'd today with plan for EGD today     Acute hypoxic respiratory failure, resolved  BNP 2737. CXR without congestion  Now on room air  Does not appear overloaded on exam and creatinine improving with IVF     Urosepsis secondary to right ureteral calculus  Urine and blood cx growing E. Coli and enterococcus faecalis   On IV antibiotics per primary team     RAKEL- creatinine improving to 2.2 today after IVF. unknown baseline. Suspect 2/2 severe sepsis and obstructing renal calculus     Dyslipidemia- , , HDL 37, . Started on atorvastatin 80 mg daily     Morbid obesity- BMI 51.89     Plan  Hs troponin elevation secondary to severe sepsis and hypoxia  Ok to hold anti platelets and heparin until GI bleeding ruled out  Cardiac cath prior to d/c- timing to be determined pending improvement from sepsis and once GIB ruled out   Strict I/Os, daily weights, am BMP  IVF per primary team  Tentative LUC 2/1/24 if EGD does not show active bleeding  Plan discussed with critical care and family at bedside    Subjective  Review of Systems   Constitutional: Positive for " "malaise/fatigue. Negative for chills.   Cardiovascular:  Negative for chest pain, dyspnea on exertion, leg swelling, orthopnea, palpitations and syncope.   Respiratory:  Positive for cough and wheezing. Negative for shortness of breath, sleep disturbances due to breathing and sputum production.    Gastrointestinal:  Negative for bloating, nausea and vomiting.   Genitourinary:         Suprapubic pressure   Neurological:  Negative for dizziness, light-headedness and weakness.   Psychiatric/Behavioral:  Negative for altered mental status.    All other systems reviewed and are negative.    Objective:   Vitals: Blood pressure 156/65, pulse 74, temperature 98.8 °F (37.1 °C), temperature source Oral, resp. rate 22, height 4' 9.01\" (1.448 m), weight 109 kg (239 lb 13.8 oz), SpO2 90%., Body mass index is 51.89 kg/m².,     Systolic (24hrs), Avg:160 , Min:130 , Max:187     Diastolic (24hrs), Av, Min:65, Max:86        Intake/Output Summary (Last 24 hours) at 2024 1201  Last data filed at 2024 0600  Gross per 24 hour   Intake 429.65 ml   Output 950 ml   Net -520.35 ml     Wt Readings from Last 3 Encounters:   24 109 kg (239 lb 13.8 oz)     Telemetry Review: NSR    Physical Exam  Vitals reviewed.   Constitutional:       General: She is not in acute distress.     Appearance: She is obese.   Neck:      Vascular: No hepatojugular reflux or JVD.   Cardiovascular:      Rate and Rhythm: Normal rate and regular rhythm.      Pulses: Normal pulses.      Heart sounds: Normal heart sounds. No murmur heard.     No friction rub. No gallop.   Pulmonary:      Effort: Pulmonary effort is normal. No respiratory distress.      Breath sounds: Wheezing (improved from yesterday) present. No rales.      Comments: Room air  Abdominal:      General: Bowel sounds are normal. There is no distension.      Palpations: Abdomen is soft.      Tenderness: There is no abdominal tenderness.   Genitourinary:     Comments: King catheter " draining clear, light yellow urine  Musculoskeletal:         General: No tenderness. Normal range of motion.      Cervical back: Neck supple.      Right lower leg: No edema.      Left lower leg: No edema.   Skin:     General: Skin is warm and dry.      Findings: No erythema.   Neurological:      Mental Status: She is alert and oriented to person, place, and time.   Psychiatric:         Mood and Affect: Mood normal.     LABORATORY RESULTS      CBC with diff:   Results from last 7 days   Lab Units 01/31/24  0508 01/30/24  0615 01/29/24  0543 01/29/24  0210 01/28/24  2126   WBC Thousand/uL 22.74* 30.35* 25.47* 30.32* 27.34*   HEMOGLOBIN g/dL 12.3 13.5 12.8 12.6 13.7   HEMATOCRIT % 38.4 41.0 39.1 39.2 42.3   MCV fL 99* 98 99* 101* 98   PLATELETS Thousands/uL 92* 103* 121* 133* 148*   RBC Million/uL 3.88 4.18 3.94 3.90 4.32   MCH pg 31.7 32.3 32.5 32.3 31.7   MCHC g/dL 32.0 32.9 32.7 32.1 32.4   RDW % 14.6 14.5 14.2 14.3 13.9   MPV fL 12.2 12.2 12.1 11.7 11.4   NRBC AUTO /100 WBCs  --   --  0  --   --        CMP:  Results from last 7 days   Lab Units 01/31/24  0508 01/30/24  1802 01/30/24  0615 01/29/24  0543 01/29/24  0210 01/28/24  2126   POTASSIUM mmol/L 4.5 4.6 4.0 4.6 3.9 3.7   CHLORIDE mmol/L 103 101 101 100 100 99   CO2 mmol/L 22 20* 22 23 23 23   BUN mg/dL 57* 57* 54* 40* 37* 34*   CREATININE mg/dL 2.26* 2.49* 2.89* 2.80* 2.75* 2.58*   CALCIUM mg/dL 8.5 8.8 8.6 7.9* 7.9* 8.8   AST U/L  --   --   --  64*  --  46*   ALT U/L  --   --   --  16  --  11   ALK PHOS U/L  --   --   --  60  --  69   EGFR ml/min/1.73sq m 19 17 14 15 15 16       BMP:  Results from last 7 days   Lab Units 01/31/24  0508 01/30/24  1802 01/30/24  0615 01/29/24  0543 01/29/24  0210 01/28/24  2126   POTASSIUM mmol/L 4.5 4.6 4.0 4.6 3.9 3.7   CHLORIDE mmol/L 103 101 101 100 100 99   CO2 mmol/L 22 20* 22 23 23 23   BUN mg/dL 57* 57* 54* 40* 37* 34*   CREATININE mg/dL 2.26* 2.49* 2.89* 2.80* 2.75* 2.58*   CALCIUM mg/dL 8.5 8.8 8.6 7.9* 7.9* 8.8  "    Lab Results   Component Value Date    CREATININE 2.26 (H) 01/31/2024    CREATININE 2.49 (H) 01/30/2024    CREATININE 2.89 (H) 01/30/2024       Results from last 7 days   Lab Units 01/31/24  0508 01/30/24  0615 01/29/24  0543 01/29/24  0210   MAGNESIUM mg/dL 2.2 2.3 2.2 1.5*      Results from last 7 days   Lab Units 01/29/24  0543   HEMOGLOBIN A1C % 5.3     Results from last 7 days   Lab Units 01/29/24  0748 01/28/24  2126   INR  1.43* 1.22*       Lipid Profile:   No results found for: \"CHOL\"  Lab Results   Component Value Date    HDL 37 (L) 01/29/2024     Lab Results   Component Value Date    LDLCALC 123 (H) 01/29/2024     Lab Results   Component Value Date    TRIG 223 (H) 01/29/2024     Meds/Allergies   all current active meds have been reviewed and current meds:   Current Facility-Administered Medications   Medication Dose Route Frequency    acetaminophen (TYLENOL) tablet 975 mg  975 mg Oral Q8H PRN    albuterol (PROVENTIL HFA,VENTOLIN HFA) inhaler 2 puff  2 puff Inhalation PRN    ampicillin (OMNIPEN) 2,000 mg in sodium chloride 0.9 % 100 mL IVPB  2,000 mg Intravenous Q8H    atorvastatin (LIPITOR) tablet 80 mg  80 mg Oral QPM    chlorhexidine (PERIDEX) 0.12 % oral rinse 15 mL  15 mL Mouth/Throat Q12H GUNJAN    diphenhydrAMINE (BENADRYL) 25 mg in sodium chloride 0.9 % 50 mL IVPB  25 mg Intravenous Q6H PRN    EPINEPHrine PF (ADRENALIN) 1 mg/mL injection 0.3 mg  0.3 mg Intramuscular Once PRN    Famotidine (PF) (PEPCID) injection 20 mg  20 mg Intravenous Once PRN    ipratropium (ATROVENT) 0.02 % inhalation solution 0.5 mg  0.5 mg Nebulization TID    levalbuterol (XOPENEX) inhalation solution 1.25 mg  1.25 mg Nebulization TID    melatonin tablet 6 mg  6 mg Oral HS    multi-electrolyte (PLASMALYTE-A/ISOLYTE-S PH 7.4) IV solution  75 mL/hr Intravenous Continuous    pantoprazole (PROTONIX) injection 40 mg  40 mg Intravenous Q12H GUNJAN    trimethobenzamide (TIGAN) IM injection 200 mg  200 mg Intramuscular Q6H PRN     No " medications prior to admission.       multi-electrolyte, 75 mL/hr, Last Rate: 75 mL/hr (01/31/24 1019)      Counseling / Coordination of Care  Total floor / unit time spent today 20 minutes.  Greater than 50% of total time was spent with the patient and / or family counseling and / or coordination of care.      ** Please Note: Dragon 360 Dictation voice to text software may have been used in the creation of this document. **

## 2024-01-31 NOTE — ASSESSMENT & PLAN NOTE
CT chest/ABD/pelvis - 6mm calculus in the right ureterovesical junction and moderate hydronephrosis.  Urology consulted in ED and patient was taken to OR for cysto with stent placement on 1/29/24  Continue ABX, currently day 2 of Ampicillin  Maintain navas catheter  Monitor I&O  Monitor renal function

## 2024-02-01 ENCOUNTER — APPOINTMENT (OUTPATIENT)
Dept: NON INVASIVE DIAGNOSTICS | Facility: HOSPITAL | Age: 79
DRG: 853 | End: 2024-02-01
Payer: MEDICARE

## 2024-02-01 PROBLEM — D69.6 THROMBOCYTOPENIA (HCC): Status: ACTIVE | Noted: 2024-02-01

## 2024-02-01 PROBLEM — R19.5 HEME POSITIVE STOOL: Status: ACTIVE | Noted: 2024-02-01

## 2024-02-01 PROBLEM — I10 HYPERTENSION: Status: ACTIVE | Noted: 2024-02-01

## 2024-02-01 LAB
ANION GAP SERPL CALCULATED.3IONS-SCNC: 9 MMOL/L
BASOPHILS # BLD MANUAL: 0 THOUSAND/UL (ref 0–0.1)
BASOPHILS NFR MAR MANUAL: 0 % (ref 0–1)
BUN SERPL-MCNC: 52 MG/DL (ref 5–25)
CALCIUM SERPL-MCNC: 7.8 MG/DL (ref 8.4–10.2)
CHLORIDE SERPL-SCNC: 105 MMOL/L (ref 96–108)
CO2 SERPL-SCNC: 21 MMOL/L (ref 21–32)
CREAT SERPL-MCNC: 1.84 MG/DL (ref 0.6–1.3)
EOSINOPHIL # BLD MANUAL: 0.15 THOUSAND/UL (ref 0–0.4)
EOSINOPHIL NFR BLD MANUAL: 1 % (ref 0–6)
ERYTHROCYTE [DISTWIDTH] IN BLOOD BY AUTOMATED COUNT: 14.6 % (ref 11.6–15.1)
GFR SERPL CREATININE-BSD FRML MDRD: 25 ML/MIN/1.73SQ M
GLUCOSE SERPL-MCNC: 71 MG/DL (ref 65–140)
HCT VFR BLD AUTO: 36.6 % (ref 34.8–46.1)
HGB BLD-MCNC: 11.8 G/DL (ref 11.5–15.4)
LYMPHOCYTES # BLD AUTO: 1.05 THOUSAND/UL (ref 0.6–4.47)
LYMPHOCYTES # BLD AUTO: 7 % (ref 14–44)
MAGNESIUM SERPL-MCNC: 2.2 MG/DL (ref 1.9–2.7)
MCH RBC QN AUTO: 31.8 PG (ref 26.8–34.3)
MCHC RBC AUTO-ENTMCNC: 32.2 G/DL (ref 31.4–37.4)
MCV RBC AUTO: 99 FL (ref 82–98)
MONOCYTES # BLD AUTO: 0.75 THOUSAND/UL (ref 0–1.22)
MONOCYTES NFR BLD: 5 % (ref 4–12)
NEUTROPHILS # BLD MANUAL: 13.08 THOUSAND/UL (ref 1.85–7.62)
NEUTS SEG NFR BLD AUTO: 87 % (ref 43–75)
PHOSPHATE SERPL-MCNC: 2.8 MG/DL (ref 2.3–4.1)
PLATELET # BLD AUTO: 86 THOUSANDS/UL (ref 149–390)
PLATELET BLD QL SMEAR: ABNORMAL
PMV BLD AUTO: 13 FL (ref 8.9–12.7)
POTASSIUM SERPL-SCNC: 4.2 MMOL/L (ref 3.5–5.3)
RBC # BLD AUTO: 3.71 MILLION/UL (ref 3.81–5.12)
RBC MORPH BLD: NORMAL
SODIUM SERPL-SCNC: 135 MMOL/L (ref 135–147)
WBC # BLD AUTO: 15.03 THOUSAND/UL (ref 4.31–10.16)

## 2024-02-01 PROCEDURE — 94640 AIRWAY INHALATION TREATMENT: CPT

## 2024-02-01 PROCEDURE — 84100 ASSAY OF PHOSPHORUS: CPT | Performed by: INTERNAL MEDICINE

## 2024-02-01 PROCEDURE — 97530 THERAPEUTIC ACTIVITIES: CPT

## 2024-02-01 PROCEDURE — 99232 SBSQ HOSP IP/OBS MODERATE 35: CPT | Performed by: INTERNAL MEDICINE

## 2024-02-01 PROCEDURE — 94760 N-INVAS EAR/PLS OXIMETRY 1: CPT

## 2024-02-01 PROCEDURE — 88305 TISSUE EXAM BY PATHOLOGIST: CPT | Performed by: SPECIALIST

## 2024-02-01 PROCEDURE — 85027 COMPLETE CBC AUTOMATED: CPT | Performed by: INTERNAL MEDICINE

## 2024-02-01 PROCEDURE — 97116 GAIT TRAINING THERAPY: CPT

## 2024-02-01 PROCEDURE — C9113 INJ PANTOPRAZOLE SODIUM, VIA: HCPCS

## 2024-02-01 PROCEDURE — 83735 ASSAY OF MAGNESIUM: CPT | Performed by: INTERNAL MEDICINE

## 2024-02-01 PROCEDURE — 97535 SELF CARE MNGMENT TRAINING: CPT

## 2024-02-01 PROCEDURE — 99223 1ST HOSP IP/OBS HIGH 75: CPT | Performed by: INTERNAL MEDICINE

## 2024-02-01 PROCEDURE — NC001 PR NO CHARGE: Performed by: INTERNAL MEDICINE

## 2024-02-01 PROCEDURE — 99233 SBSQ HOSP IP/OBS HIGH 50: CPT | Performed by: INTERNAL MEDICINE

## 2024-02-01 PROCEDURE — 85007 BL SMEAR W/DIFF WBC COUNT: CPT | Performed by: INTERNAL MEDICINE

## 2024-02-01 PROCEDURE — C9113 INJ PANTOPRAZOLE SODIUM, VIA: HCPCS | Performed by: INTERNAL MEDICINE

## 2024-02-01 PROCEDURE — 80048 BASIC METABOLIC PNL TOTAL CA: CPT | Performed by: INTERNAL MEDICINE

## 2024-02-01 RX ORDER — SODIUM CHLORIDE, SODIUM GLUCONATE, SODIUM ACETATE, POTASSIUM CHLORIDE, MAGNESIUM CHLORIDE, SODIUM PHOSPHATE, DIBASIC, AND POTASSIUM PHOSPHATE .53; .5; .37; .037; .03; .012; .00082 G/100ML; G/100ML; G/100ML; G/100ML; G/100ML; G/100ML; G/100ML
75 INJECTION, SOLUTION INTRAVENOUS CONTINUOUS
Status: DISPENSED | OUTPATIENT
Start: 2024-02-01 | End: 2024-02-02

## 2024-02-01 RX ORDER — HEPARIN SODIUM 5000 [USP'U]/ML
5000 INJECTION, SOLUTION INTRAVENOUS; SUBCUTANEOUS EVERY 8 HOURS SCHEDULED
Status: DISCONTINUED | OUTPATIENT
Start: 2024-02-01 | End: 2024-02-08 | Stop reason: HOSPADM

## 2024-02-01 RX ORDER — SODIUM CHLORIDE, SODIUM GLUCONATE, SODIUM ACETATE, POTASSIUM CHLORIDE, MAGNESIUM CHLORIDE, SODIUM PHOSPHATE, DIBASIC, AND POTASSIUM PHOSPHATE .53; .5; .37; .037; .03; .012; .00082 G/100ML; G/100ML; G/100ML; G/100ML; G/100ML; G/100ML; G/100ML
75 INJECTION, SOLUTION INTRAVENOUS CONTINUOUS
Status: DISCONTINUED | OUTPATIENT
Start: 2024-02-01 | End: 2024-02-01

## 2024-02-01 RX ORDER — AMLODIPINE BESYLATE 10 MG/1
10 TABLET ORAL DAILY
Status: DISCONTINUED | OUTPATIENT
Start: 2024-02-01 | End: 2024-02-03

## 2024-02-01 RX ORDER — HYDRALAZINE HYDROCHLORIDE 20 MG/ML
10 INJECTION INTRAMUSCULAR; INTRAVENOUS EVERY 8 HOURS PRN
Status: DISCONTINUED | OUTPATIENT
Start: 2024-02-01 | End: 2024-02-08 | Stop reason: HOSPADM

## 2024-02-01 RX ADMIN — CLOPIDOGREL BISULFATE 75 MG: 75 TABLET ORAL at 10:16

## 2024-02-01 RX ADMIN — AMPICILLIN SODIUM 2000 MG: 2 INJECTION, POWDER, FOR SOLUTION INTRAVENOUS at 11:24

## 2024-02-01 RX ADMIN — IPRATROPIUM BROMIDE 0.5 MG: 0.5 SOLUTION RESPIRATORY (INHALATION) at 07:29

## 2024-02-01 RX ADMIN — HYDRALAZINE HYDROCHLORIDE 5 MG: 20 INJECTION, SOLUTION INTRAMUSCULAR; INTRAVENOUS at 05:24

## 2024-02-01 RX ADMIN — ASPIRIN 81 MG: 81 TABLET, COATED ORAL at 10:16

## 2024-02-01 RX ADMIN — CHLORHEXIDINE GLUCONATE 15 ML: 1.2 SOLUTION ORAL at 22:25

## 2024-02-01 RX ADMIN — AMLODIPINE BESYLATE 10 MG: 10 TABLET ORAL at 10:16

## 2024-02-01 RX ADMIN — LEVALBUTEROL HYDROCHLORIDE 1.25 MG: 1.25 SOLUTION RESPIRATORY (INHALATION) at 20:44

## 2024-02-01 RX ADMIN — SODIUM CHLORIDE, SODIUM GLUCONATE, SODIUM ACETATE, POTASSIUM CHLORIDE, MAGNESIUM CHLORIDE, SODIUM PHOSPHATE, DIBASIC, AND POTASSIUM PHOSPHATE 75 ML/HR: .53; .5; .37; .037; .03; .012; .00082 INJECTION, SOLUTION INTRAVENOUS at 07:38

## 2024-02-01 RX ADMIN — HEPARIN SODIUM 5000 UNITS: 5000 INJECTION INTRAVENOUS; SUBCUTANEOUS at 22:25

## 2024-02-01 RX ADMIN — CHLORHEXIDINE GLUCONATE 15 ML: 1.2 SOLUTION ORAL at 10:17

## 2024-02-01 RX ADMIN — Medication 6 MG: at 22:24

## 2024-02-01 RX ADMIN — IPRATROPIUM BROMIDE 0.5 MG: 0.5 SOLUTION RESPIRATORY (INHALATION) at 20:44

## 2024-02-01 RX ADMIN — ACETAMINOPHEN 975 MG: 325 TABLET, FILM COATED ORAL at 20:19

## 2024-02-01 RX ADMIN — CEFTRIAXONE 2000 MG: 2 INJECTION, POWDER, FOR SOLUTION INTRAMUSCULAR; INTRAVENOUS at 22:28

## 2024-02-01 RX ADMIN — ATORVASTATIN CALCIUM 80 MG: 40 TABLET, FILM COATED ORAL at 17:19

## 2024-02-01 RX ADMIN — PANTOPRAZOLE SODIUM 40 MG: 40 INJECTION, POWDER, FOR SOLUTION INTRAVENOUS at 22:24

## 2024-02-01 RX ADMIN — AMPICILLIN SODIUM 2000 MG: 2 INJECTION, POWDER, FOR SOLUTION INTRAVENOUS at 20:21

## 2024-02-01 RX ADMIN — SODIUM CHLORIDE, SODIUM GLUCONATE, SODIUM ACETATE, POTASSIUM CHLORIDE, MAGNESIUM CHLORIDE, SODIUM PHOSPHATE, DIBASIC, AND POTASSIUM PHOSPHATE 75 ML/HR: .53; .5; .37; .037; .03; .012; .00082 INJECTION, SOLUTION INTRAVENOUS at 11:33

## 2024-02-01 RX ADMIN — SODIUM CHLORIDE, SODIUM GLUCONATE, SODIUM ACETATE, POTASSIUM CHLORIDE, MAGNESIUM CHLORIDE, SODIUM PHOSPHATE, DIBASIC, AND POTASSIUM PHOSPHATE 75 ML/HR: .53; .5; .37; .037; .03; .012; .00082 INJECTION, SOLUTION INTRAVENOUS at 17:18

## 2024-02-01 RX ADMIN — CEFTRIAXONE 2000 MG: 2 INJECTION, POWDER, FOR SOLUTION INTRAMUSCULAR; INTRAVENOUS at 10:11

## 2024-02-01 RX ADMIN — AMPICILLIN SODIUM 2000 MG: 2 INJECTION, POWDER, FOR SOLUTION INTRAVENOUS at 05:20

## 2024-02-01 RX ADMIN — LEVALBUTEROL HYDROCHLORIDE 1.25 MG: 1.25 SOLUTION RESPIRATORY (INHALATION) at 07:29

## 2024-02-01 RX ADMIN — PANTOPRAZOLE SODIUM 40 MG: 40 INJECTION, POWDER, FOR SOLUTION INTRAVENOUS at 10:17

## 2024-02-01 NOTE — PROGRESS NOTES
"General Cardiology   Progress Note -  Team One   Loida Duarte 78 y.o. female MRN: [id number]  Unit/Bed#: ICU 01 Encounter: [id number]    Assessment     Elevated troponin- likely type 2 MI in setting of sepsis and acute hypoxic respiratory failure  No c/o chest pain on admission. Admits to random episodes \"when my  stresses me out\"  Hs troponin trend: 14,797-->11,339-->12,709  ECG- sinus tachycardia with RBBB  TTE LVEF 65% with inferior wall hypokinesis, normal RV size/function, moderate mitral valve thickening with mild MR, and mild TR  CT notes coronary artery calcifications  Started on IV Heparin, ASA, statin, and Plavix  There was some concern for coffee ground emesis PTA but she also was eating black licorice which is what she thinks caused it. No evidence of acute bleeding initially but with + FOBT 1/31. S/p EGD with esophagitis but no bleeding.  S/p 48 hours of IV heparin  On ASA, Plavix, and statin     HTN  Presented septic with borderline low blood pressures.  She is now hypertensive with blood pressures up to the 180s over 80s earlier this morning.  She has been started on amlodipine 10 mg daily by the primary team.  Last /86    Acute hypoxic respiratory failure, resolved  BNP 2737. CXR without congestion  Now on room air  Does not appear overloaded on exam and creatinine improving with IVF     Urosepsis secondary to right ureteral calculus  Urine and blood cx growing E. Coli and enterococcus faecalis   On IV antibiotics per primary team     RAKEL- creatinine improving, down to 1.8 today after IVF. Unknown baseline; peaked at 2.89. Suspect 2/2 severe sepsis and obstructing renal calculus     Dyslipidemia- , , HDL 37, . Started on atorvastatin 80 mg daily     Morbid obesity- BMI 52.61     Plan  Hs troponin elevation secondary to severe sepsis and hypoxia  Continue aspirin and Plavix now that upper GI bleeding has been ruled out  LUC today to rule out endocarditis in the " "setting of multi organism bacteremia  Strict I/Os, daily weights, am BMP  IVF per primary team  Tentative cardiac catheterization on Friday, 2024  Continue telemetry monitoring  Plan discussed with critical care and family at bedside    Addendum- spoke with nephrology and prefer to wait til Monday for cardiac catheterization to allow more time for renal recovery. Will plan for cardiac cath .    Subjective  Review of Systems   Constitutional: Negative for chills, diaphoresis, fever and malaise/fatigue.   Cardiovascular:  Negative for chest pain, dyspnea on exertion, leg swelling, orthopnea, palpitations and syncope.   Respiratory:  Positive for cough. Negative for shortness of breath, sleep disturbances due to breathing and sputum production.    Gastrointestinal:  Positive for bowel incontinence. Negative for bloating, nausea and vomiting.   Genitourinary: Negative.    Neurological:  Positive for weakness. Negative for dizziness and light-headedness.   Psychiatric/Behavioral:  Negative for altered mental status.    All other systems reviewed and are negative.    Objective:   Vitals: Blood pressure 146/86, pulse 91, temperature 98.3 °F (36.8 °C), temperature source Oral, resp. rate (!) 38, height 4' 9.01\" (1.448 m), weight 110 kg (243 lb 2.7 oz), SpO2 94%., Body mass index is 52.61 kg/m².,     Systolic (24hrs), Av , Min:132 , Max:192     Diastolic (24hrs), Av, Min:69, Max:89      Intake/Output Summary (Last 24 hours) at 2024 1005  Last data filed at 2024 0601  Gross per 24 hour   Intake --   Output 1175 ml   Net -1175 ml     Wt Readings from Last 3 Encounters:   24 110 kg (243 lb 2.7 oz)       Telemetry Review: NSR with brief SVT yesterday evening    Physical Exam  Vitals reviewed.   Constitutional:       General: She is not in acute distress.     Appearance: She is obese.   Neck:      Vascular: No hepatojugular reflux or JVD.   Cardiovascular:      Rate and Rhythm: Normal rate and " regular rhythm.      Pulses: Normal pulses.      Heart sounds: Normal heart sounds. No murmur heard.     No friction rub. No gallop.   Pulmonary:      Effort: Pulmonary effort is normal. No respiratory distress.      Breath sounds: No rales.      Comments: Decreased at bases, faint expiratory wheezing.  91% on room air  Abdominal:      General: Bowel sounds are normal. There is no distension.      Palpations: Abdomen is soft.      Tenderness: There is no abdominal tenderness.   Genitourinary:     Comments: King catheter draining yellow urine  Musculoskeletal:         General: No tenderness. Normal range of motion.      Cervical back: Neck supple.      Right lower leg: No edema.      Left lower leg: No edema.   Skin:     General: Skin is warm and dry.      Findings: No erythema.   Neurological:      Mental Status: She is alert and oriented to person, place, and time.   Psychiatric:         Mood and Affect: Mood normal.         LABORATORY RESULTS      CBC with diff:   Results from last 7 days   Lab Units 02/01/24  0502 01/31/24  0508 01/30/24  0615 01/29/24  0543 01/29/24  0210 01/28/24  2126   WBC Thousand/uL 15.03* 22.74* 30.35* 25.47* 30.32* 27.34*   HEMOGLOBIN g/dL 11.8 12.3 13.5 12.8 12.6 13.7   HEMATOCRIT % 36.6 38.4 41.0 39.1 39.2 42.3   MCV fL 99* 99* 98 99* 101* 98   PLATELETS Thousands/uL 86* 92* 103* 121* 133* 148*   RBC Million/uL 3.71* 3.88 4.18 3.94 3.90 4.32   MCH pg 31.8 31.7 32.3 32.5 32.3 31.7   MCHC g/dL 32.2 32.0 32.9 32.7 32.1 32.4   RDW % 14.6 14.6 14.5 14.2 14.3 13.9   MPV fL 13.0* 12.2 12.2 12.1 11.7 11.4   NRBC AUTO /100 WBCs  --   --   --  0  --   --      CMP:  Results from last 7 days   Lab Units 02/01/24  0502 01/31/24  0508 01/30/24  1802 01/30/24  0615 01/29/24  0543 01/29/24  0210 01/28/24  2126   POTASSIUM mmol/L 4.2 4.5 4.6 4.0 4.6 3.9 3.7   CHLORIDE mmol/L 105 103 101 101 100 100 99   CO2 mmol/L 21 22 20* 22 23 23 23   BUN mg/dL 52* 57* 57* 54* 40* 37* 34*   CREATININE mg/dL 1.84*  "2.26* 2.49* 2.89* 2.80* 2.75* 2.58*   CALCIUM mg/dL 7.8* 8.5 8.8 8.6 7.9* 7.9* 8.8   AST U/L  --   --   --   --  64*  --  46*   ALT U/L  --   --   --   --  16  --  11   ALK PHOS U/L  --   --   --   --  60  --  69   EGFR ml/min/1.73sq m 25 19 17 14 15 15 16       BMP:  Results from last 7 days   Lab Units 02/01/24  0502 01/31/24  0508 01/30/24  1802 01/30/24  0615 01/29/24  0543 01/29/24  0210 01/28/24  2126   POTASSIUM mmol/L 4.2 4.5 4.6 4.0 4.6 3.9 3.7   CHLORIDE mmol/L 105 103 101 101 100 100 99   CO2 mmol/L 21 22 20* 22 23 23 23   BUN mg/dL 52* 57* 57* 54* 40* 37* 34*   CREATININE mg/dL 1.84* 2.26* 2.49* 2.89* 2.80* 2.75* 2.58*   CALCIUM mg/dL 7.8* 8.5 8.8 8.6 7.9* 7.9* 8.8       Lab Results   Component Value Date    CREATININE 1.84 (H) 02/01/2024    CREATININE 2.26 (H) 01/31/2024    CREATININE 2.49 (H) 01/30/2024       No results found for: \"NTBNP\"        Results from last 7 days   Lab Units 02/01/24  0502 01/31/24  0508 01/30/24  0615 01/29/24  0543 01/29/24  0210   MAGNESIUM mg/dL 2.2 2.2 2.3 2.2 1.5*          Results from last 7 days   Lab Units 01/29/24  0543   HEMOGLOBIN A1C % 5.3              Results from last 7 days   Lab Units 01/29/24  0748 01/28/24  2126   INR  1.43* 1.22*       Lipid Profile:   No results found for: \"CHOL\"  Lab Results   Component Value Date    HDL 37 (L) 01/29/2024     Lab Results   Component Value Date    LDLCALC 123 (H) 01/29/2024     Lab Results   Component Value Date    TRIG 223 (H) 01/29/2024     Meds/Allergies   all current active meds have been reviewed and current meds:   Current Facility-Administered Medications   Medication Dose Route Frequency    acetaminophen (TYLENOL) tablet 975 mg  975 mg Oral Q8H PRN    albuterol (PROVENTIL HFA,VENTOLIN HFA) inhaler 2 puff  2 puff Inhalation PRN    amLODIPine (NORVASC) tablet 10 mg  10 mg Oral Daily    ampicillin (OMNIPEN) 2,000 mg in sodium chloride 0.9 % 100 mL IVPB  2,000 mg Intravenous Q8H    aspirin (ECOTRIN LOW STRENGTH) EC " tablet 81 mg  81 mg Oral Daily    atorvastatin (LIPITOR) tablet 80 mg  80 mg Oral QPM    cefTRIAXone (ROCEPHIN) 2,000 mg in dextrose 5 % 50 mL IVPB  2,000 mg Intravenous Q12H    chlorhexidine (PERIDEX) 0.12 % oral rinse 15 mL  15 mL Mouth/Throat Q12H GUNJAN    clopidogrel (PLAVIX) tablet 75 mg  75 mg Oral Daily    diphenhydrAMINE (BENADRYL) 25 mg in sodium chloride 0.9 % 50 mL IVPB  25 mg Intravenous Q6H PRN    EPINEPHrine PF (ADRENALIN) 1 mg/mL injection 0.3 mg  0.3 mg Intramuscular Once PRN    Famotidine (PF) (PEPCID) injection 20 mg  20 mg Intravenous Once PRN    heparin (porcine) subcutaneous injection 5,000 Units  5,000 Units Subcutaneous Q8H GUNJAN    hydrALAZINE (APRESOLINE) injection 10 mg  10 mg Intravenous Q8H PRN    ipratropium (ATROVENT) 0.02 % inhalation solution 0.5 mg  0.5 mg Nebulization TID    levalbuterol (XOPENEX) inhalation solution 1.25 mg  1.25 mg Nebulization TID    melatonin tablet 6 mg  6 mg Oral HS    multi-electrolyte (PLASMALYTE-A/ISOLYTE-S PH 7.4) IV solution  75 mL/hr Intravenous Continuous    pantoprazole (PROTONIX) injection 40 mg  40 mg Intravenous Q12H GUNJAN    trimethobenzamide (TIGAN) IM injection 200 mg  200 mg Intramuscular Q6H PRN     No medications prior to admission.       multi-electrolyte, 75 mL/hr, Last Rate: 75 mL/hr (02/01/24 0738)    Counseling / Coordination of Care  Total floor / unit time spent today 20 minutes.  Greater than 50% of total time was spent with the patient and / or family counseling and / or coordination of care.      ** Please Note: Dragon 360 Dictation voice to text software may have been used in the creation of this document. **

## 2024-02-01 NOTE — CONSULTS
NEPHROLOGY HOSPITAL CONSULTATION   Loida Duarte 78 y.o. female MRN: [id number]  Unit/Bed#: ICU 01 Encounter: [id number]    ASSESSMENT and PLAN:  Loida Duarte is a 78 y.o. female who was admitted to [facility] after presenting with abdominal pain, fever, tachycardia and leukocytosis.. A renal consultation is requested today for assistance in the management of RAKEL and optimization for cardiac catheterization.    Acute kidney injury.  Etiology most likely prerenal RAKEL +/- ATN  in setting of bacteremic infection +/- obstructive uropathy (moderate right-sided hydroureteronephrosis in setting of 6 mm right UVJ calculus upon admission).  Serum creatinine on admission 2.58.  Creatinine peaked 2.9 on 01/30.  Creatinine today 1.84.  Urinalysis large leukocytes, 10-20 RBC, innumerable WBCs.  Urine culture is growing more than 100,000 and E. coli.  She will be at increased risk of contrast associated nephropathy as she is still recovering from RAKEL.  If creatinine continues to improve with hydration, okay to proceed with cardiac catheterization.  Start Isolyte at 75 cc/h today.  Trend BMP.    2. Acute hypoxic respiratory failure.  This was thought to be secondary to bronchospasm.  Management per pulmonology.    3. E. coli/Enterococcus bacteremia.  This is due to right ureteral calculus/urinary tract infection.  Status post cystoscopy with stent placement.  Currently on IV ampicillin per infectious disease.    4. Thrombocytopenia.  Most likely due to sepsis.  No hemolysis to suggest thrombotic microangiopathy.    5. NSTEMI.  Currently on IV heparin, aspirin, statin and Plavix per cardiology.  Plans noted for cardiac catheterization per cardiology.    Discussed with ICU team and cardiology team.  After discussion we agreed that kidney function is currently improving and to continue IV fluids in anticipation for cardiac catheterization and okay to proceed if creatinine continues to improve in next 24  hours.    HISTORY OF PRESENT ILLNESS:  Requesting Physician: Rosa Cline DO  Reason for Consult: RAKEL    Loida Duarte is a 78 y.o. female who was admitted to [facility] after presenting with abdominal pain, fever, tachycardia and leukocytosis.. A renal consultation is requested today for assistance in the management of RAKEL and optimization for cardiac catheterization.  She was noted to have bacteremia in setting of UTI.  She was also noted to have right-sided hydroureteronephrosis due to right UVJ calculus status post cystoscopy and stent placement.  She was also noted to have NSTEMI.  She was also noted to have acute hypoxic respiratory failure due to bronchospasm.    PAST MEDICAL HISTORY:  History reviewed. No pertinent past medical history.    PAST SURGICAL HISTORY:  Past Surgical History:   Procedure Laterality Date    FL RETROGRADE PYELOGRAM  1/29/2024    ME CYSTO BLADDER W/URETERAL CATHETERIZATION Right 1/29/2024    Procedure: CYSTOSCOPY RETROGRADE PYELOGRAM WITH INSERTION STENT URETERAL;  Surgeon: Barrera Vargas MD;  Location: [facility];  Service: Urology       ALLERGIES:  No Known Allergies    SOCIAL HISTORY:  Social History     Substance and Sexual Activity   Alcohol Use Never     Social History     Substance and Sexual Activity   Drug Use Never     Social History     Tobacco Use   Smoking Status Former    Types: Cigarettes   Smokeless Tobacco Never       FAMILY HISTORY:  History reviewed. No pertinent family history.    MEDICATIONS:    Current Facility-Administered Medications:     acetaminophen (TYLENOL) tablet 975 mg, 975 mg, Oral, Q8H PRN, Rasta Hernandez MD    albuterol (PROVENTIL HFA,VENTOLIN HFA) inhaler 2 puff, 2 puff, Inhalation, PRN, Rasta Hernandez MD    amLODIPine (NORVASC) tablet 10 mg, 10 mg, Oral, Daily, Luiz Calle MD, 10 mg at 02/01/24 1016    ampicillin (OMNIPEN) 2,000 mg in sodium chloride 0.9 % 100 mL IVPB, 2,000 mg, Intravenous, Q8H, Rasta  MD David, Last Rate: 200 mL/hr at 02/01/24 0520, 2,000 mg at 02/01/24 0520    aspirin (ECOTRIN LOW STRENGTH) EC tablet 81 mg, 81 mg, Oral, Daily, MARLENE Mata, 81 mg at 02/01/24 1016    atorvastatin (LIPITOR) tablet 80 mg, 80 mg, Oral, QPM, Rasta Hernandez MD, 80 mg at 01/31/24 1716    cefTRIAXone (ROCEPHIN) 2,000 mg in dextrose 5 % 50 mL IVPB, 2,000 mg, Intravenous, Q12H, Darren Carrasco MD, Last Rate: 100 mL/hr at 02/01/24 1011, 2,000 mg at 02/01/24 1011    chlorhexidine (PERIDEX) 0.12 % oral rinse 15 mL, 15 mL, Mouth/Throat, Q12H GUNJAN, Rasta Hernandez MD, 15 mL at 02/01/24 1017    clopidogrel (PLAVIX) tablet 75 mg, 75 mg, Oral, Daily, MARLENE Mata, 75 mg at 02/01/24 1016    diphenhydrAMINE (BENADRYL) 25 mg in sodium chloride 0.9 % 50 mL IVPB, 25 mg, Intravenous, Q6H PRN, Rasta Hernandez MD    EPINEPHrine PF (ADRENALIN) 1 mg/mL injection 0.3 mg, 0.3 mg, Intramuscular, Once PRN, Rasta Hernandez MD    Famotidine (PF) (PEPCID) injection 20 mg, 20 mg, Intravenous, Once PRN, Rasta Hernandez MD    heparin (porcine) subcutaneous injection 5,000 Units, 5,000 Units, Subcutaneous, Q8H [facility], Luiz Calle MD    hydrALAZINE (APRESOLINE) injection 10 mg, 10 mg, Intravenous, Q8H PRN, Luiz Calle MD    ipratropium (ATROVENT) 0.02 % inhalation solution 0.5 mg, 0.5 mg, Nebulization, TID, Rasta Hernandez MD, 0.5 mg at 02/01/24 0729    levalbuterol (XOPENEX) inhalation solution 1.25 mg, 1.25 mg, Nebulization, TID, Rasta Hernandez MD, 1.25 mg at 02/01/24 0729    melatonin tablet 6 mg, 6 mg, Oral, HS, Rasta Hernandez MD, 6 mg at 01/31/24 2230    multi-electrolyte (PLASMALYTE-A/ISOLYTE-S PH 7.4) IV solution, 75 mL/hr, Intravenous, Continuous, Luiz Calle MD, Last Rate: 75 mL/hr at 02/01/24 0738, 75 mL/hr at 02/01/24 0738    pantoprazole (PROTONIX) injection 40 mg, 40 mg, Intravenous, Q12H GUNJAN, Rasta Hernandez MD, 40 mg at 02/01/24 1017    trimethobenzamide (TIGAN) IM injection 200 mg, 200 mg,  Intramuscular, Q6H PRN, Rasta Hernandez MD, 200 mg at 01/30/24 1958    REVIEW OF SYSTEMS:  Review of Systems   Constitutional:  Negative for chills and fever.   HENT:  Negative for ear pain and sore throat.    Eyes:  Negative for pain and visual disturbance.   Respiratory:  Negative for cough and shortness of breath.    Cardiovascular:  Negative for chest pain and palpitations.   Gastrointestinal:  Negative for abdominal pain and vomiting.   Genitourinary:  Negative for dysuria and hematuria.   Musculoskeletal:  Negative for arthralgias and back pain.   Skin:  Negative for color change and rash.   Neurological:  Negative for seizures and syncope.   All other systems reviewed and are negative.    PHYSICAL EXAM:  Current Weight: Weight - Scale: 110 kg (243 lb 2.7 oz)  First Weight: Weight - Scale: 110 kg (242 lb 11.6 oz)  Vitals:    02/01/24 0730 02/01/24 0830 02/01/24 0900 02/01/24 0930   BP: (!) 178/79 (!) 179/75 (!) 176/79 146/86   Pulse: 80 73 91 91   Resp: 22 (!) 26 (!) 36 (!) 38   Temp:       TempSrc:       SpO2: 96% 99% 94% 94%   Weight:       Height:           Intake/Output Summary (Last 24 hours) at 2/1/2024 1052  Last data filed at 2/1/2024 0601  Gross per 24 hour   Intake --   Output 1175 ml   Net -1175 ml     Physical Exam  Constitutional:       Appearance: Normal appearance.   HENT:      Head: Normocephalic and atraumatic.      Mouth/Throat:      Mouth: Mucous membranes are moist.      Pharynx: Oropharynx is clear.   Cardiovascular:      Rate and Rhythm: Normal rate and regular rhythm.   Pulmonary:      Effort: Pulmonary effort is normal.      Breath sounds: Wheezing present.   Abdominal:      General: Bowel sounds are normal.      Palpations: Abdomen is soft.   Musculoskeletal:         General: Normal range of motion.      Right lower leg: No edema.      Left lower leg: No edema.   Skin:     General: Skin is warm and dry.   Neurological:      General: No focal deficit present.      Mental Status: She is  "alert and oriented to person, place, and time. Mental status is at baseline.   Psychiatric:         Mood and Affect: Mood normal.         Behavior: Behavior normal.         Thought Content: Thought content normal.         Judgment: Judgment normal.       Invasive Devices:   Urethral Catheter Latex;Double-lumen 18 Fr. (Active)   Reasons to continue Urinary Catheter  Accurate I&O assessment in critically ill patients (48 hr. max) 01/31/24 2000   Goal for Removal Voiding trial when ambulation improves 01/31/24 2000   Site Assessment Clean;Skin intact 01/31/24 2000   King Care Done 02/01/24 0900   Collection Container Standard drainage bag 01/31/24 2000   Securement Method Securing device (Describe) 01/31/24 2000   Output (mL) 300 mL 02/01/24 0601     Lab Results:   Results from last 7 days   Lab Units 02/01/24  0502 01/31/24  0508 01/30/24  1802 01/30/24  0615 01/29/24  0543 01/29/24  0210 01/28/24  2126   WBC Thousand/uL 15.03* 22.74*  --  30.35* 25.47*   < > 27.34*   HEMOGLOBIN g/dL 11.8 12.3  --  13.5 12.8   < > 13.7   HEMATOCRIT % 36.6 38.4  --  41.0 39.1   < > 42.3   PLATELETS Thousands/uL 86* 92*  --  103* 121*   < > 148*   POTASSIUM mmol/L 4.2 4.5 4.6 4.0 4.6   < > 3.7   CHLORIDE mmol/L 105 103 101 101 100   < > 99   CO2 mmol/L 21 22 20* 22 23   < > 23   BUN mg/dL 52* 57* 57* 54* 40*   < > 34*   CREATININE mg/dL 1.84* 2.26* 2.49* 2.89* 2.80*   < > 2.58*   CALCIUM mg/dL 7.8* 8.5 8.8 8.6 7.9*   < > 8.8   MAGNESIUM mg/dL 2.2 2.2  --  2.3 2.2   < >  --    PHOSPHORUS mg/dL 2.8 3.4  --  3.9 4.3*   < >  --    ALK PHOS U/L  --   --   --   --  60  --  69   ALT U/L  --   --   --   --  16  --  11   AST U/L  --   --   --   --  64*  --  46*    < > = values in this interval not displayed.     Portions of the record may have been created with voice recognition software. Occasional wrong word or \"sound a like\" substitutions may have occurred due to the inherent limitations of voice recognition software. Read the chart " carefully and recognize, using context, where substitutions have occurred. If you have any questions, please contact the dictating provider.

## 2024-02-01 NOTE — PROGRESS NOTES
Progress Note - Infectious Disease   Loida Duarte 78 y.o. female MRN: [id number]  Unit/Bed#: ICU 01 Encounter: [id number]      Impression/Plan:  Sepsis. POA. E/b fever to 100.7F, tachycardia, leukocytosis to 27, and lactic acidosis on admission. Most likely I/s/o #2, #3, and #4. CT C/A/P on admission showed a 6mm right distal ureteral calculus with right-sided hydronephrosis with perinephric and periureteral stranding. CT chest did not show any evidence of consolidation. Moderate cardiomegaly noted. COVID/Flu/RSV negative. UA obtained with innumerable WBC, large LE, and moderate blood c/f urinary source. Remains afebrile and HDS. WBC improved from 30 to 22 today. No new symptoms per patient. Urine culture from straight cath resulted positive for E.coli, Ucx from cystoscopy positive for E.faecalis. Now with c/f GI bleed. GI consulted for EGD.  EGD negative for a bleeding source.  Antibiotics as below  Recheck blood cultures as below  Serial examinations  Monitor temperature/vitals while on IV antibiotics  O2 management per critical care team  Other supportive care per critical care team  Monitoring for potential antimicrobial toxicity by following CBCD and CMP     Polymicrobial bacteremia. Blood cultures on admission positive both sets (2/2) for GNRs and GPCs in pairs identified as E.coli and E.faecalis. Most likely etiology #3 and #4. TTE obtained showed moderate thickening of the mitral valve. Favor LUC to further assess for endocarditis. Patient passed amoxicillin challenge and was narrowed from vancomycin/CTX to ampicillin. Continues to tolerate. LUC currently being held given concern for GI bleed. Plan for EGD today with GI.  Repeat blood cultures positive for gram-positive cocci concerning for persistent Enterococcus bacteremia  Continue IV ampicillin 2g q8h   Add ceftriaxone 2 g IV every 12 hours for synergy in the setting of possible endocarditis  Follow-up ID and sensitivity of positive blood  culture  Recheck blood cultures x 2 sets to make sure bacteremia clearing  Check transesophageal echocardiogram  Recheck CBC with differential and CMP to make sure not developing toxicity     UTI complicated by pyelonephritis. Likely I/s/o #4. Likely etiology of #2. UA obtained in ED with innumerable WBC, large leukocytes, and moderate blood. CT A/P on admission showed #4 and perinephric and periureteral stranding. Ucx from straight cath resulted positive for E.coli, Ucx from cystoscopy positive for E.faecalis.   Antibiotics as above  Monitoring for potential antimicrobial toxicity by following CBCD and CMP     Right distal ureteral calculus c/b right-sided hydronephrosis. Likely etiology of #3. CT A/P on admission showed a 6mm right UVJ calculus. Now s/p OR on 1/29 for right ureteral stent placement with Urology.   Close urology follow-up.     Elevated troponin. Cardiology consulted. Suspected nonischemic myocardial injury in setting of sepsis. Started on IV Heparin, ASA, statin, and Plavix.   Management per cardiology     RAKEL. Scr elevated to 2.8 on admission. Unknown baseline as patient has not seen a doctor in ~50 years. Consider pre-renal I/s/o sepsis vs obstructive I/s/o renal calculus.  Renal function seems to be improving  Dose adjusted antibiotics  Volume management  Recheck BMP to see if need to dose adjust the antibiotics further     Acute hypoxic respiratory failure. POA. Noted to be 88% on room air. Also with diffuse wheezing on exam. CT chest demonstrated no acute pulmonary abnormalities. Showed cardiomegaly.  Still requiring oxygen support consider pulmonary edema.   Continue to monitor respiratory status  O2 management per primary team   Low concern for pneumonia at this time     Penicillin allergy- resolved. Had an allergy listed as swelling. States she had a rash as a child, but has not had it since. Has not seen a doctor in 50 years. Passed an amoxicillin challenge on 1/30. Tolerating ampicillin  without difficulty.   No longer a true allergy      Morbid obesity. BMI noted to be 51 on admission. May affect antibiotic dosing.     Discussed with the critical care service the plan to continue the ampicillin, and will add ceftriaxone 2 g IV every 12 hours.  They agree with the plan.    Antibiotics:  Ampicillin 2  Antibiotics 5    Subjective:  Patient has no fever, chills, sweats; no nausea, vomiting, diarrhea; no cough, shortness of breath; no pain. No new symptoms.    Objective:  Vitals:  Temp:  [98.3 °F (36.8 °C)-100.1 °F (37.8 °C)] 98.3 °F (36.8 °C)  HR:  [73-96] 80  Resp:  [20-35] 22  BP: (132-192)/(69-89) 178/79  SpO2:  [90 %-96 %] 96 %  Temp (24hrs), Av °F (37.2 °C), Min:98.3 °F (36.8 °C), Max:100.1 °F (37.8 °C)  Current: Temperature: 98.3 °F (36.8 °C)    Physical Exam:   General Appearance:  Alert, interactive, nontoxic, no acute distress.   Throat: Oropharynx moist without lesions.    Lungs:   Decreased breath sounds bilaterally; no wheezes, rhonchi or rales; respirations unlabored   Heart:  RRR; no murmur, rub or gallop   Abdomen:   Soft, non-tender, non-distended, positive bowel sounds.     Extremities: No clubbing, cyanosis    Skin: No new rashes or lesions. No draining wounds noted.       Labs, Imaging, & Other studies:   All pertinent labs and imaging studies were personally reviewed  Results from last 7 days   Lab Units 24  0502 24  0508 24  0615   WBC Thousand/uL 15.03* 22.74* 30.35*   HEMOGLOBIN g/dL 11.8 12.3 13.5   PLATELETS Thousands/uL 86* 92* 103*     Results from last 7 days   Lab Units 24  0502 24  0508 24  1802 24  0615 24  0543 24  0210 24  2126   SODIUM mmol/L 135 134* 132*   < > 134*   < > 135   POTASSIUM mmol/L 4.2 4.5 4.6   < > 4.6   < > 3.7   CHLORIDE mmol/L 105 103 101   < > 100   < > 99   CO2 mmol/L 21 22 20*   < > 23   < > 23   BUN mg/dL 52* 57* 57*   < > 40*   < > 34*   CREATININE mg/dL 1.84* 2.26* 2.49*   < > 2.80*    < > 2.58*   EGFR ml/min/1.73sq m 25 19 17   < > 15   < > 16   CALCIUM mg/dL 7.8* 8.5 8.8   < > 7.9*   < > 8.8   AST U/L  --   --   --   --  64*  --  46*   ALT U/L  --   --   --   --  16  --  11   ALK PHOS U/L  --   --   --   --  60  --  69    < > = values in this interval not displayed.     Results from last 7 days   Lab Units 01/30/24  0909 01/30/24  0836 01/29/24  1046 01/29/24  0154 01/28/24  2230 01/28/24 2131 01/28/24 2126   BLOOD CULTURE  No Growth at 24 hrs.  --   --   --   --  Escherichia coli*  Enterococcus faecalis* Escherichia coli*  Enterococcus faecalis*   GRAM STAIN RESULT   --  Gram positive cocci in pairs and chains*  --   --   --  Gram negative rods*  Gram positive cocci in pairs* Gram negative rods*  Gram positive cocci in pairs*   URINE CULTURE   --   --   --  10,000-19,000 cfu/ml Enterococcus faecalis* >100,000 cfu/ml Escherichia coli*  --   --    MRSA CULTURE ONLY   --   --  No Methicillin Resistant Staphlyococcus aureus (MRSA) isolated  --   --   --   --      Results from last 7 days   Lab Units 01/28/24 2126   PROCALCITONIN ng/ml 230.37*

## 2024-02-01 NOTE — ASSESSMENT & PLAN NOTE
- Likely undiagnosed chronic hypertension  - Consistently 190s/80s  - Started Amlodipine 5 mg daily   - Labetalol 10 mg or Hydralazine 5 mg PRN for SBP > 180

## 2024-02-01 NOTE — OCCUPATIONAL THERAPY NOTE
Occupational Therapy Treatment Note     Patient Name: Loida Duarte  Today's Date: 2/1/2024  Problem List  Principal Problem:    Severe sepsis (HCC)  Active Problems:    Right ureteral calculus    Acute respiratory insufficiency    RAKEL (acute kidney injury) (HCC)    Elevated troponin    Obstructive pyelonephritis    Heme positive stool    Thrombocytopenia (HCC)    Hypertension        02/01/24 1214   OT Last Visit   OT Visit Date 02/01/24   Note Type   Note Type Treatment   Pain Assessment   Pain Assessment Tool FLACC   Pain Location/Orientation Orientation: Bilateral;Location: Knee   Pain Radiating Towards n/a   Pain Onset/Description Frequency: Intermittent  (w/mobility)   Effect of Pain on Daily Activities limits standing and activity tolerance   Patient's Stated Pain Goal No pain   Hospital Pain Intervention(s) Repositioned;Ambulation/increased activity;Emotional support   Multiple Pain Sites No   Restrictions/Precautions   Weight Bearing Precautions Per Order No   Other Precautions Chair Alarm;Bed Alarm;Multiple lines;Telemetry;O2;Fall Risk;Pain  (2L O2)   Lifestyle   Autonomy PTA pt living with  in [address]+ apartment, pt (I) with ADLs and IADLS, (+)falls, (+)drives, use of rollator at baseline   Reciprocal Relationships supportive caregiver 3x/wk to assist with care for    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    Intrinsic Gratification enjoys playing word games on her tablet   ADL   Where Assessed Edge of bed   Eating Assistance 5  Supervision/Setup  (simulated tasks)   Eating Deficit Other (Comment)  (pt is NPO, unable to formally assess.)   Grooming Assistance 5  Supervision/Setup   Grooming Deficit Setup;Wash/dry face   LB Dressing Assistance 1  Total Assistance   LB Dressing Deficit Don/doff R sock;Don/doff L sock   LB Dressing Comments limited d/t decreased functional reach, 2* body habitus   Toileting Assistance  1  Total Assistance   Toileting Deficit Perineal hygiene   Toileting  Comments Pt incontient of bowel upon arrival to the room total assist for posterior pericare   Functional Standing Tolerance   Time ~20 seconds   Activity functional standing for participation in toileting tasks   Comments w/ min assist  x 1 person, use of BUE on RW for increased support.   Bed Mobility   Supine to Sit 2  Maximal assistance   Additional items Assist x 1;Increased time required;Verbal cues;LE management;HOB elevated;Bedrails  (trunk managment)   Additional Comments pt able to maintain static sitting balance at the EOB w/o support   Transfers   Sit to Stand 4  Minimal assistance   Additional items Assist x 1;Increased time required;Verbal cues   Stand to Sit 4  Minimal assistance   Additional items Assist x 1;Armrests;Increased time required;Verbal cues   Stand pivot 3  Moderate assistance   Additional items Assist x 1;Increased time required;Verbal cues  (RW; cueing for surface proximity)   Additional Comments Pt completed sit <> stand from recliner chair x 2 w/ min assist x 1 person, use of armrests. Verbal cues for optimal hand placement, body mechanics and surface proximity   Functional Mobility   Functional Mobility 3  Moderate assistance   Additional Comments mod assist x 1 for short household from EOB to recliner chair w/use of RW.   Additional items Rolling walker   Subjective   Subjective Pt is overall pleasent, engaged and cooperative throughout the session.   Cognition   Overall Cognitive Status Impaired   Arousal/Participation Alert;Responsive;Cooperative   Attention Attends with cues to redirect   Orientation Level Oriented to person;Oriented to time;Oriented to place;Disoriented to situation   Memory Decreased recall of recent events;Decreased short term memory;Decreased recall of precautions   Following Commands Follows one step commands with increased time or repetition   Comments Pt ID via wristband, name and . Pt appears to be anxious during session, benefits from theraputic use  of self.   Activity Tolerance   Activity Tolerance Patient limited by fatigue;Patient limited by pain   Medical Staff Made Aware Spoke to care coordination, PT, RN pre/post   Assessment   Assessment Patient seen for OT treatment on 2/1/2024 s/p admission for Severe sepsis (HCC) Patient agreeable to OT session. Patient participated in fall prevention , self-care transfers, bed mobility , functional mobility, and ADLs with intervention focus on optimizing independence in mobility and ADLs. Loida Duarte is showing improvements in activity tolerance, standing tolerance and transfers but is continuing to perform below baseline due to the following deficits: endurance ,  decreased muscular strength , decreased standing tolerance for self care tasks , decreased functional reach , decreased activity tolerance , impaired judgement and problem solving , decreased emotional regulation and coping skills , (+) pain , and impaired global mental status. Personal factors continuing to impact D/C include: Assistance needed for ADL/IADLs, Assistance needed for ADLs and functional mobility, High fall risk , decreased insight toward deficits , and decreased recall of precautions  From OT standpoint, patient would benefit from skilled intervention to maximize independence with ADLs and functional mobility. Goals remain appropriate, continue POC. At this time, recommending D/C to: level II (moderate resource intensity).   Plan   Treatment Interventions ADL retraining;Functional transfer training;Endurance training;Cognitive reorientation;Patient/family training;Equipment evaluation/education;Compensatory technique education;Activityengagement;Energy conservation   Goal Expiration Date 02/08/24   OT Treatment Day 1   OT Frequency 3-5x/wk   Discharge Recommendation   Rehab Resource Intensity Level, OT II (Moderate Resource Intensity)   Additional Comments  The patient's raw score on the AM-PAC Daily Activity Inpatient Short Form is 13. A  raw score of less than 19 suggests the patient may benefit from discharge to post-acute rehabilitation services. Please refer to the recommendation of the Occupational Therapist for safe discharge planning.   AM-PAC Daily Activity Inpatient   Lower Body Dressing 1   Bathing 2   Toileting 1   Upper Body Dressing 2   Grooming 3   Eating 4   Daily Activity Raw Score 13   Daily Activity Standardized Score (Calc for Raw Score >=11) 32.03   AM-PAC Applied Cognition Inpatient   Following a Speech/Presentation 3   Understanding Ordinary Conversation 3   Taking Medications 2   Remembering Where Things Are Placed or Put Away 3   Remembering List of 4-5 Errands 2   Taking Care of Complicated Tasks 2   Applied Cognition Raw Score 15   Applied Cognition Standardized Score 33.54   End of Consult   Education Provided Yes   Patient Position at End of Consult Bedside chair;Bed/Chair alarm activated;All needs within reach        GOALS:      -Patient will perform grooming tasks sitting in chair with overall Mod I in order to increase overall independence PROGRESSING      -Patient will be Mod I with UB dressing using AE and AD as needed in order to increase (I) with ADLs     -Patient will be Mod I with UB bathing using AE and AD as needed in order to increase (I) with ADLs     -Patient will be Mod A  with LB dressing with use of AE and AD as needed in order to increase (I) with ADLs     -Patient will be Mod A  with LB bathing with use of AE and AD as needed in order to increase (I) with ADLs     -Patient will complete toileting w/ Mod A  w/ G hygiene/thoroughness in order to reduce caregiver burden     -Patient will demonstrate Supervision with bed mobility for ability to manage own comfort and initiate OOB tasks.      -Patient will perform functional transfers with Supervision to/from all surfaces using DME as needed in order to increase (I) with functional tasks   PROGRESSING   -Patient will be Supervision with functional mobility  to/from bathroom for increased independence with toileting tasks     -Patient will engage in ongoing cognitive assessment in order to assist with safe discharge planning/recommendations.     -Patient will follow multi-step instructions with no VC in order to safely complete functional tasks PROGRESSING      -Patient will independently integrate one pacing strategy into morning ADL's.     -Patient will demonstrate PLB techniques during ADL tasks with min VC     This session, pt required and most appropriately benefited from skilled OT/PT co-eval due to significant regression from functional baseline, decreased activity tolerance, and unpredictable medical and/or functional status. OT and PT goals were addressed separately as seen in documentation.     Jammie Green MS OTR/L   [place] Licensure# [id number]

## 2024-02-01 NOTE — CASE MANAGEMENT
Case Management Discharge Planning Note    Patient name Loida Duarte  Location ICU ICU  MRN [id number]  : 1945 Date 2024       Current Admission Date: 2024  Current Admission Diagnosis:Severe sepsis (HCC)   Patient Active Problem List    Diagnosis Date Noted    Heme positive stool 2024    Thrombocytopenia (HCC) 2024    Hypertension 2024    Right ureteral calculus 2024    Severe sepsis (HCC) 2024    Acute respiratory insufficiency 2024    RAKEL (acute kidney injury) (HCC) 2024    Elevated troponin 2024    Obstructive pyelonephritis 2024      LOS (days): 3  Geometric Mean LOS (GMLOS) (days): 9.9  Days to GMLOS:6.3     OBJECTIVE:  Risk of Unplanned Readmission Score: 13.9         Current admission status: Inpatient   Preferred Pharmacy: No Pharmacies Listed  Primary Care Provider: No primary care provider on file.    Primary Insurance: MEDICARE  Secondary Insurance: MARBELLA BURKETT PENDING    DISCHARGE DETAILS:    Discharge planning discussed with:: Pt's Missy gonzalez Jr  Freedom of Choice: Yes  Comments - [place] of Choice: [facility]    Contacts  Patient Contacts: Missy Gonzalez Jr  Relationship to Patient:: Family  Contact Method: Phone  Reason/Outcome: Continuity of Care, Emergency Contact, Referral, Discharge Planning    Other Referral/Resources/Interventions Provided:  Interventions: Short Term Rehab  Referral Comments: CM recieved a phone call from pt's sonMissy Jr. He is aware CM left  for Nancy. He did confirm they would like to accept the bed at [facility] for STR once pt is ready for dc. Aware CM will keep them updated of any changes. He did report he may be a better contact once pt is ready as Nancy will be working the next 4 days.    Treatment Team Recommendation: Short Term Rehab  Discharge Destination Plan:: Short Term Rehab

## 2024-02-01 NOTE — PROGRESS NOTES
Critical Care Interval Transfer Note:    Patient is an 78-year-old female with no past medical history because she has not been following up with Dr for the last 50 years, presented with urosepsis, type II MI, headache concern for GI bleeding and underwent EGD which showed non-bleeding ulcers, also has been bacteremic with a faecalis and E. coli most likely source from urinary tract.  She also had right-sided kidney stone causing hydroureteronephrosis and requiring stent placement.  Also was having wheezing previously which was initially thought to be possible volume overload but at this point it feels more like undiagnosed asthma for which she is on Xopenex and Atrovent. Patient is ready to be transferred to floors.    Please refer to progress note from earlier today for full details.     Barriers to discharge:   IV antibiotics -duration TBD pending LUC  Cardiac catheterization prior to discharge -TBD  LUC -planned for today  PT and OT with rehab determination  Further cardiac management     Consults: IP CONSULT TO UROLOGY  IP CONSULT TO CASE MANAGEMENT  IP CONSULT TO CARDIOLOGY  IP CONSULT TO INFECTIOUS DISEASES  IP CONSULT TO GASTROENTEROLOGY  IP CONSULT TO NEPHROLOGY    Recommended to review admission imaging for incidental findings and document in discharge navigator: Chart reviewed, no known incidental findings noted at this time.      Discharge Plan: Anticipate discharge in 24-48 hrs to discharge location to be determined pending rehab evaluations.  [place] Plan: Voiding trial after improvement in ambulation             Patient seen and evaluated by Critical Care today and deemed to be appropriate for transfer to Med Surg. Spoke to Dr. Reynolds from [facility] to accept transfer. Critical care can be contacted via [facility] with any questions or concerns.

## 2024-02-01 NOTE — CASE MANAGEMENT
Case Management Progress Note    Patient name Loida Duarte  Location ICU 01/ICU  MRN [id number]  : 1945 Date 2024       LOS (days): 3  Geometric Mean LOS (GMLOS) (days): 9.9  Days to GMLOS:6.4        OBJECTIVE:        Current admission status: Inpatient  Preferred Pharmacy: No Pharmacies Listed  Primary Care Provider: No primary care provider on file.    Primary Insurance: MEDICARE  Secondary Insurance: MARBELLA BURKETT PENDING    PROGRESS NOTE:    [facility] unable to accept. [facility] able to offer a bed. Will need COVID test prior to transfer.     [facility] reserved and ANAY left VM for pt's daughter, Nancy, requesting a return call to update her of same.

## 2024-02-01 NOTE — PLAN OF CARE
Problem: PHYSICAL THERAPY ADULT  Goal: Performs mobility at highest level of function for planned discharge setting.  See evaluation for individualized goals.  Description: Treatment/Interventions: Functional transfer training, LE strengthening/ROM, Therapeutic exercise, Endurance training, Cognitive reorientation, Patient/family training, Equipment eval/education, Bed mobility, Gait training, Compensatory technique education, Spoke to nursing, Spoke to case management      See flowsheet documentation for full assessment, interventions and recommendations.  Outcome: Progressing  Note: Prognosis: Fair  Problem List: Decreased range of motion, Decreased endurance, Impaired balance, Decreased mobility, Decreased cognition, Impaired judgement, Decreased safety awareness, Pain  Assessment: PT treatment provided today to: bed mobility, transfer, gait, balance, and endurance training in order to increase pt's activity tolerance and to maximize pt independence w/ functional mobility during current admission w/ severe sepsis. Education provided to pt on transfer technique and hand placement. Pt continues to require Ax1 for all aspects of functional mobility and remains limited in ambulatory tolerance due to decreased endurance, knee pain, gait deviations. Pt will continue benefit from PT to promote independence w/ functional mobility, address activity deficits, and progress towards set goals. Recommend DC w/ level: II (Moderate Rehab Resource Intensity) when medically cleared.  Barriers to Discharge: Decreased caregiver support     Rehab Resource Intensity Level, PT: II (Moderate Resource Intensity)    See flowsheet documentation for full assessment.

## 2024-02-01 NOTE — ASSESSMENT & PLAN NOTE
- Patient had black, heme positive stools on AM of 1/31/24 during hospitalization   - Hemoglobin remains stable at 12   - GI consulted, appreciate recommendations   - Underwent EGD on 1/31/24 to rule out upper GI bleed, which revealed 2 ulcers (stomach and duodenum) but no active bleeding   - Await pathology results for gastric biopsy to evaluate for H. Pylori    - Schedule repeat EGD in 3 months    - Pantoprazole 40 mg BID   - Continue to trend H/H

## 2024-02-01 NOTE — PLAN OF CARE
Problem: Potential for Falls  Goal: Patient will remain free of falls  Description: INTERVENTIONS:  - Educate patient/family on patient safety including physical limitations  - Instruct patient to call for assistance with activity   - Consult OT/PT to assist with strengthening/mobility   - Keep Call bell within reach  - Keep bed low and locked with side rails adjusted as appropriate  - Keep care items and personal belongings within reach  - Initiate and maintain comfort rounds  - Make Fall Risk Sign visible to staff  - Offer Toileting every 2 Hours, in advance of need  - Initiate/Maintain bed/chair alarm  - Obtain necessary fall risk management equipment: yellow socks/bracelet  - Apply yellow socks and bracelet for high fall risk patients  - Consider moving patient to room near nurses station  2/1/2024 1816 by Xochitl Bolanos RN  Outcome: Progressing    Problem: Prexisting or High Potential for Compromised Skin Integrity  Goal: Skin integrity is maintained or improved  Description: INTERVENTIONS:  - Identify patients at risk for skin breakdown  - Assess and monitor skin integrity  - Assess and monitor nutrition and hydration status  - Monitor labs   - Assess for incontinence   - Turn and reposition patient  - Assist with mobility/ambulation  - Relieve pressure over bony prominences  - Avoid friction and shearing  - Provide appropriate hygiene as needed including keeping skin clean and dry  - Evaluate need for skin moisturizer/barrier cream  - Collaborate with interdisciplinary team   - Patient/family teaching  - Consider wound care consult   2/1/2024 1816 by Xochitl Bolanos RN  Outcome: Progressing    Problem: Nutrition/Hydration-ADULT  Goal: Nutrient/Hydration intake appropriate for improving, restoring or maintaining nutritional needs  Description: Monitor and assess patient's nutrition/hydration status for malnutrition. Collaborate with interdisciplinary team and initiate plan and interventions as ordered.   Monitor patient's weight and dietary intake as ordered or per policy. Utilize nutrition screening tool and intervene as necessary. Determine patient's food preferences and provide high-protein, high-caloric foods as appropriate.     INTERVENTIONS:  - Monitor oral intake, urinary output, labs, and treatment plans  - Assess nutrition and hydration status and recommend course of action  - Evaluate amount of meals eaten  - Assist patient with eating if necessary   - Allow adequate time for meals  - Recommend/ encourage appropriate diets, oral nutritional supplements, and vitamin/mineral supplements  - Order, calculate, and assess calorie counts as needed  - Assess need for intravenous fluids  - Provide specific nutrition/hydration education as appropriate  - Include patient/family/caregiver in decisions related to nutrition  2/1/2024 1816 by Xochitl Bolanos RN  Outcome: Progressing       Problem: PAIN - ADULT  Goal: Verbalizes/displays adequate comfort level or baseline comfort level  Description: Interventions:  - Encourage patient to monitor pain and request assistance  - Assess pain using appropriate pain scale  - Administer analgesics based on type and severity of pain and evaluate response  - Implement non-pharmacological measures as appropriate and evaluate response  - Consider cultural and social influences on pain and pain management  - Notify physician/advanced practitioner if interventions unsuccessful or patient reports new pain  Outcome: Progressing     Problem: INFECTION - ADULT  Goal: Absence or prevention of progression during hospitalization  Description: INTERVENTIONS:  - Assess and monitor for signs and symptoms of infection  - Monitor lab/diagnostic results  - Monitor all insertion sites, i.e. indwelling lines, tubes, and drains  - [place] appropriate cooling/warming therapies per order  - Administer medications as ordered  - Instruct and encourage patient and family to use good hand hygiene  technique  - Identify and instruct in appropriate isolation precautions for identified infection/condition  Outcome: Progressing  Goal: Absence of fever/infection during neutropenic period  Description: INTERVENTIONS:  - Monitor WBC  Outcome: Progressing     Problem: SAFETY ADULT  Goal: Patient will remain free of falls  Description: INTERVENTIONS:  - Educate patient/family on patient safety including physical limitations  - Instruct patient to call for assistance with activity   - Consult OT/PT to assist with strengthening/mobility   - Keep Call bell within reach  - Keep bed low and locked with side rails adjusted as appropriate  - Keep care items and personal belongings within reach  - Initiate and maintain comfort rounds  - Make Fall Risk Sign visible to staff  - Offer Toileting every 2 Hours, in advance of need  - Initiate/Maintain bed/chair alarm  - Obtain necessary fall risk management equipment: yellow socks/bracelet  - Apply yellow socks and bracelet for high fall risk patients  - Consider moving patient to room near nurses station  2/1/2024 1816 by Xochitl Bolanos RN  Outcome: Progressing  Goal: Maintain or return to baseline ADL function  Description: INTERVENTIONS:  -  Assess patient's ability to carry out ADLs; assess patient's baseline for ADL function and identify physical deficits which impact ability to perform ADLs (bathing, care of mouth/teeth, toileting, grooming, dressing, etc.)  - Assess/evaluate cause of self-care deficits   - Assess range of motion  - Assess patient's mobility; develop plan if impaired  - Assess patient's need for assistive devices and provide as appropriate  - Encourage maximum independence but intervene and supervise when necessary  - Involve family in performance of ADLs  - Assess for home care needs following discharge   - Consider OT consult to assist with ADL evaluation and planning for discharge  - Provide patient education as appropriate  Outcome: Progressing  Goal:  Maintains/Returns to pre admission functional level  Description: INTERVENTIONS:  - Perform AM-PAC 6 Click Basic Mobility/ Daily Activity assessment daily.  - Set and communicate daily mobility goal to care team and patient/family/caregiver.   - Collaborate with rehabilitation services on mobility goals if consulted  - Perform Range of Motion  - Reposition patient every 2 hours.  - Out of bed for meals if possible  - Out of bed for toileting  - Record patient progress and toleration of activity level   Outcome: Progressing     Problem: DISCHARGE PLANNING  Goal: Discharge to home or other facility with appropriate resources  Description: INTERVENTIONS:  - Identify barriers to discharge w/patient and caregiver  - Arrange for needed discharge resources and transportation as appropriate  - Identify discharge learning needs (meds, wound care, etc.)  - Arrange for interpretive services to assist at discharge as needed  - Refer to Case Management Department for coordinating discharge planning if the patient needs post-hospital services based on physician/advanced practitioner order or complex needs related to functional status, cognitive ability, or social support system  Outcome: Progressing     Problem: Knowledge Deficit  Goal: Patient/family/caregiver demonstrates understanding of disease process, treatment plan, medications, and discharge instructions  Description: Complete learning assessment and assess knowledge base.  Interventions:  - Provide teaching at level of understanding  - Provide teaching via preferred learning methods  Outcome: Progressing

## 2024-02-01 NOTE — PROGRESS NOTES
[facility]  Progress Note  Name: Loida Duarte I  MRN: [id number]  Unit/Bed#: ICU 01 I Date of Admission: 1/28/2024   Date of Service: 2/1/2024 I Hospital Day: 3    Assessment/Plan   * Severe sepsis (HCC)  Assessment & Plan  As evidenced by initial fever (100.7), tachycardia (110), tachypnea (26), leukocytosis (30), Lactate 3.8  Suspected source: Urosepsis, bacteremia  CT chest/ABD/pelvis - 6mm calculus in the right ureterovesical junction and moderate hydronephrosis. Signs of pyelonephritis  Procalcitonin - 230  Received 500cc resuscitation fluid in ED, not 30cc/kg given concerns for respiratory status as pt noted to be tachyneic, hypoxic and wheezing after receiving IVF  Blood cultures x 2 drawn - positive for E faecalis and E. coli  UA: +leukocytes, +WBC, occasional bacteria, negative nitrites  Received Vancomycin, Cefepime, Cipro in ED  Blood cultures positive for E coli and enterococcus faecalis, urine culture positive for gram negative rods    Plan:  Received 4 days of Vancomycin, 2 days of Cefepime, 2 days of Ceftriaxone  Currently on Day 2 of Ampicillin as current antibiotic regimen   LUC planned for today to evaluate for endocarditis given bacteremia  ID consulted, recommendations appreciated  Follow up initial and repeat blood cultures - sensitivities  Follow up urine cultures - sensitivities  Trend WBC, fever curve - improving    Elevated troponin  Assessment & Plan  Pt noted to be SOB, wheezing, tachypnea. Endorsed chest pain while in OR.  hsTroponin elevated  BNP - 2,737  CXR unremarkable  Cardiac echo 1/29 showed normal EF 65%, and grade 1 diastolic dysfunction with hypokinesis of basal inferoseptal and inferior walls   Concern for Type II MI per cardiology secondary to sepsis/hypoxia, although there is concern for CAD and she will eventually need cath    Plan:  Cardiology consulted, appreciate recommendations  Continue ASA/Plavix. Heparin gtt finished after 48  hours  Cardiac cath during this hospitalization, after kidney function improves  Continue to trend hsTroponins  Continue supplemental oxygen    Acute respiratory insufficiency  Assessment & Plan  Pt presented with tachypnea, increased WOB and audible expiratory wheezing, SpO2 88% on room air.  No known pulmonary history, briefly smoked cigarettes many years ago.  Spo2 improved on 2L NC but wheezes persist. Albuterol x1 given in ED with minimal improvement.   LS diminished with wheeze in upper airway, no stridor  CXR - no acute cardiopulmonary abnormalities.  CT chest - No acute findings, no consolidation, pneumothorax, pleural effusion  BNP - 2,737  Initially thought to be volume overload, but now concern for undiagnosed COPD    Plan:  Continue supplemental oxygen for goal SpO2 > 88%, given likely undiagnosed COPD, currently on 1-2L NC  Suspect component of undiagnosed sleep apnea, continue 2 L NC qhs, consider CPAP  Atrovent and Xopenex scheduled TID, patient feels improved  Encourage incentive spirometry    Right ureteral calculus  Assessment & Plan  CT chest/ABD/pelvis - 6mm calculus in the right ureterovesical junction and moderate hydronephrosis.  Urology consulted in ED and patient was taken to OR for cysto with stent placement on 1/29/24  Continue ABX, currently day 2 of Ampicillin  Maintain navas catheter  Monitor I&O  Monitor renal function    RAKEL (acute kidney injury) (HCC)  Assessment & Plan  Initial sCr - 2.58  Unknown baseline sCr   Likely worsened in setting of severe sepsis, obstructing renal calculus  Received 500cc bolus in ED, additional fluids initially held given respiratory status, elevated BNP and concern for volume overload.    Maintain navas catheter  Strict I&O  Avoid nephrotoxins and hypotension  Monitor and replete electrolytes  Repeat BMP every day  Ordered gentle hydration which improved creatinine    Hypertension  Assessment & Plan  - Likely undiagnosed chronic hypertension  -  Consistently 190s/80s  - Started Amlodipine 5 mg daily   - Labetalol 10 mg or Hydralazine 5 mg PRN for SBP > 180    Thrombocytopenia (HCC)  Assessment & Plan  - Downtrending during hospitalization 148 -> 92  - Continue to trend   - Likely secondary to sepsis  - Low suspicion for HIT    Heme positive stool  Assessment & Plan  - Patient had black, heme positive stools on AM of 1/31/24 during hospitalization   - Hemoglobin remains stable at 12   - GI consulted, appreciate recommendations   - Underwent EGD on 1/31/24 to rule out upper GI bleed, which revealed 2 ulcers (stomach and duodenum) but no active bleeding   - Await pathology results for gastric biopsy to evaluate for H. Pylori    - Schedule repeat EGD in 3 months    - Pantoprazole 40 mg BID   - Continue to trend H/H     Obstructive pyelonephritis  Assessment & Plan  Patient had right-sided ureterovesical calculus and hydronephrosis  Patient had urology consulted. Cystoscopy ureteroscopy and stent placement             Disposition: Stepdown Level 1    ICU Core Measures     A: Assess, Prevent, and Manage Pain Has pain been assessed? Yes  Need for changes to pain regimen? No   B: Both SAT/SAT  N/A   C: Choice of Sedation RASS Goal: 0 Alert and Calm  Need for changes to sedation or analgesia regimen? No   D: Delirium CAM-ICU: Negative   E: Early Mobility  Plan for early mobility? Yes   F: Family Engagement Plan for family engagement today? Yes       Antibiotic Review: Patient on appropriate coverage based on culture data.     Review of Invasive Devices:    King Plan: Continue for accurate I/O monitoring for 48 hours        Prophylaxis:  VTE Plavix   Stress Ulcer  covered byFamotidine (PF) (PEPCID) injection 20 mg [[id number]], pantoprazole (PROTONIX) injection 40 mg [[id number]]         Significant 24hr Events     24hr events: Patient with asymptomatic hypertension last night, with reads consistently 190s/80s.  Received 10 mg Labetalol.  Started on 5 mg Amlodipine  daily.  Continue Hydralazine 5 mg PRN for SBP > 180.  Otherwise, no acute overnight events.      Subjective   Review of Systems   Constitutional:  Negative for chills and fever.   HENT:  Negative for ear pain and sore throat.    Eyes:  Negative for pain and visual disturbance.   Respiratory:  Negative for cough and shortness of breath.    Cardiovascular:  Negative for chest pain and palpitations.   Gastrointestinal:  Negative for abdominal pain and vomiting.   Genitourinary:  Negative for dysuria and hematuria.   Musculoskeletal:  Negative for arthralgias and back pain.   Skin:  Negative for color change and rash.   Neurological:  Negative for seizures and syncope.   All other systems reviewed and are negative.     Objective                            Vitals I/O      Most Recent Min/Max in 24hrs   Temp 98.9 °F (37.2 °C) Temp  Min: 98.7 °F (37.1 °C)  Max: 100.1 °F (37.8 °C)   Pulse 77 Pulse  Min: 70  Max: 96   Resp (!) 33 Resp  Min: 19  Max: 33   /82 BP  Min: 136/73  Max: 192/84   O2 Sat 93 % SpO2  Min: 90 %  Max: 97 %      Intake/Output Summary (Last 24 hours) at 2/1/2024 0314  Last data filed at 2/1/2024 0000  Gross per 24 hour   Intake 22 ml   Output 1025 ml   Net -1003 ml       Diet NPO; Sips with meds    Invasive Monitoring           Physical Exam   Physical Exam  Skin:     General: Skin is warm and dry.   HENT:      Head: Normocephalic and atraumatic.      Mouth/Throat:      Mouth: Mucous membranes are moist.   Cardiovascular:      Rate and Rhythm: Normal rate and regular rhythm.      Pulses: Normal pulses.      Heart sounds: Normal heart sounds.   Musculoskeletal:      Right lower leg: Edema present.      Left lower leg: Edema present.   Abdominal:      Palpations: Abdomen is soft.      Tenderness: There is no abdominal tenderness.   Constitutional:       General: She is sleeping. She is not in acute distress.     Appearance: She is ill-appearing (chronic).      Interventions: Nasal cannula in place.    Pulmonary:      Effort: Pulmonary effort is normal.      Breath sounds: Wheezing and rhonchi present.   Neurological:      General: No focal deficit present.      Mental Status: She is easily aroused.   Genitourinary/Anorectal:  King present.          Diagnostic Studies      EKG: NSR on tele  Imaging: Reviewed      Medications:  Scheduled PRN   amLODIPine, 5 mg, Daily  ampicillin, 2,000 mg, Q8H  aspirin, 81 mg, Daily  atorvastatin, 80 mg, QPM  chlorhexidine, 15 mL, Q12H GUNJAN  clopidogrel, 75 mg, Daily  ipratropium, 0.5 mg, TID  levalbuterol, 1.25 mg, TID  melatonin, 6 mg, HS  pantoprazole, 40 mg, Q12H GUNJAN      acetaminophen, 975 mg, Q8H PRN  albuterol, 2 puff, PRN  diphenhydrAMINE (BENADRYL) 25 mg in sodium chloride 0.9 % 50 mL IVPB, 25 mg, Q6H PRN  EPINEPHrine PF, 0.3 mg, Once PRN  famotidine, 20 mg, Once PRN  hydrALAZINE, 5 mg, Q8H PRN  trimethobenzamide, 200 mg, Q6H PRN       Continuous          Labs:    CBC    Recent Labs     01/30/24  0615 01/31/24  0508   WBC 30.35* 22.74*   HGB 13.5 12.3   HCT 41.0 38.4   * 92*   BANDSPCT 22* 13*     BMP    Recent Labs     01/30/24  1802 01/31/24  0508   SODIUM 132* 134*   K 4.6 4.5    103   CO2 20* 22   AGAP 11 9   BUN 57* 57*   CREATININE 2.49* 2.26*   CALCIUM 8.8 8.5       Coags    Recent Labs     01/30/24  2128 01/31/24  0341   PTT 53* 75*        Additional Electrolytes  Recent Labs     01/30/24  0615 01/31/24  0508   MG 2.3 2.2   PHOS 3.9 3.4   CAIONIZED 1.10* 1.12          Blood Gas    No recent results  No recent results LFTs  No recent results    Infectious  No recent results  Glucose  Recent Labs     01/30/24  0615 01/30/24  1802 01/31/24  0508   GLUC 106 123 83                   Urvashi Phipps MD

## 2024-02-01 NOTE — ASSESSMENT & PLAN NOTE
- Downtrending during hospitalization 148 -> 92  - Continue to trend   - Likely secondary to sepsis  - Low suspicion for HIT

## 2024-02-01 NOTE — PHYSICAL THERAPY NOTE
PHYSICAL THERAPY TREATMENT NOTE          Patient Name: Loida Duarte  Today's Date: 2/1/2024        AGE:   78 y.o.  Mrn:   [id number]  ADMIT DX:  Tachypnea [R06.82]  Nephrolithiasis [N20.0]  UTI (urinary tract infection) [N39.0]  Hypoxia [R09.02]  Bandemia [D72.825]  Severe sepsis (HCC) [A41.9, R65.20]    Past Medical History:  History reviewed. No pertinent past medical history.       02/01/24 1144   PT Last Visit   PT Visit Date 02/01/24   Note Type   Note Type Treatment   Pain Assessment   Pain Assessment Tool FLACC   Pain Location/Orientation Orientation: Bilateral;Location: Knee;Other (Comment)  (R>L)   Pain Onset/Description Onset: Ongoing  (pt reports chronic)   Effect of Pain on Daily Activities limits ease of mobility   Hospital Pain Intervention(s) Repositioned;Ambulation/increased activity;Emotional support   Pain Rating: FLACC (Rest) - Face 0   Pain Rating: FLACC (Rest) - Legs 0   Pain Rating: FLACC (Rest) - Activity 0   Pain Rating: FLACC (Rest) - Cry 0   Pain Rating: FLACC (Rest) - Consolability 0   Score: FLACC (Rest) 0   Pain Rating: FLACC (Activity) - Face 1   Pain Rating: FLACC (Activity) - Legs 0   Pain Rating: FLACC (Activity) - Activity 1   Pain Rating: FLACC (Activity) - Cry 1   Pain Rating: FLACC (Activity) - Consolability 1   Score: FLACC (Activity) 4   Restrictions/Precautions   Weight Bearing Precautions Per Order No   Other Precautions Chair Alarm;Bed Alarm;Multiple lines;O2;Fall Risk;Pain;Telemetry  (2L O2 via NC)   General   Chart Reviewed Yes   Family/Caregiver Present No   Cognition   Overall Cognitive Status Impaired   Arousal/Participation Alert;Cooperative   Attention Attends with cues to redirect   Orientation Level Oriented to person;Oriented to place;Oriented to time;Disoriented to situation   Memory Decreased recall of recent events;Decreased short term memory;Decreased recall of precautions   Following Commands Follows one  step commands with increased time or repetition   Comments Pt ID via name and ; pt agreeable to PT eval and mobility   Bed Mobility   Supine to Sit 2  Maximal assistance   Additional items Assist x 1;HOB elevated;Increased time required;Verbal cues;LE management  (towards pt's R)   Additional Comments pt able to maintain sitting balance at EOB w/ supervision   Transfers   Sit to Stand 4  Minimal assistance   Additional items Assist x 1;Increased time required;Verbal cues;Armrests   Stand to Sit 4  Minimal assistance   Additional items Assist x 1;Armrests;Increased time required;Verbal cues   Additional Comments 2 sit<>stand transfers, VC for hand placement, limited carryover demonstrated between trials   Ambulation/Elevation   Gait pattern Improper Weight shift;Wide JANEL;Decreased foot clearance;Short stride;Excessively slow;Decreased heel strike;Decreased toe off   Gait Assistance 3  Moderate assist   Additional items Assist x 1;Verbal cues   Assistive Device Rolling walker   Distance 3'   Ambulation/Elevation Additional Comments assistance w/ RW mangagement   Balance   Static Sitting Fair +   Dynamic Sitting Fair   Static Standing Poor +  (w/ RW)   Dynamic Standing Poor +   Ambulatory Poor  (w/ RW)   Endurance Deficit   Endurance Deficit Yes   Endurance Deficit Description decreased overall activity tolerance   Activity Tolerance   Activity Tolerance Patient limited by fatigue;Patient limited by pain   Medical Staff Made Aware Pt benefited from PT/OT care coordination w/ OT Jammie due to to allow for challenge of pt's activity tolerance, PT and OT goals were addressed individually during session   Nurse Made Aware RN Cat   Assessment   Prognosis Fair   Problem List Decreased range of motion;Decreased endurance;Impaired balance;Decreased mobility;Decreased cognition;Impaired judgement;Decreased safety awareness;Pain   Assessment PT treatment provided today to: bed mobility, transfer, gait, balance, and endurance  training in order to increase pt's activity tolerance and to maximize pt independence w/ functional mobility during current admission w/ severe sepsis. Education provided to pt on transfer technique and hand placement. Pt continues to require Ax1 for all aspects of functional mobility and remains limited in ambulatory tolerance due to decreased endurance, knee pain, gait deviations. Pt will continue benefit from PT to promote independence w/ functional mobility, address activity deficits, and progress towards set goals. Recommend DC w/ level: II (Moderate Rehab Resource Intensity) when medically cleared.   Goals   Patient Goals to go home to her    STG Expiration Date 02/12/24   Short Term Goal #1 Patient PT goals established in order to maximize functional independence. Pt will: complete all bed mobility in hospital bed at LISA level in order to promote increased OOB functional mobility and simulate home environment; complete all transfers with RW at LISA level in order to increase safety with functional mobility; ambulate >100ft with RW at LISA level in order to increase safety with household and in facility distance functional mobility; improve B LE strength to >/= 4+/5 MMT t/o in order to increase safety with functional mobility and decrease risk of falls; demonstrate understanding and independence with LE strengthening HEP; improve ambulatory balance to >/= fair+ grade with RW in order to promote safety and increased independence with mobility; tolerate >3hrs OOB in upright position, in order to improve muscular endurance and respiratory status; improve AM-PAC score to >/= 19/24 in order to increase independence with mobility and decrease burden of care; improve Barthel Index score to >/= 55/100 in order to increase independence and decrease risk of falls.   PT Treatment Day 1   Plan   Treatment/Interventions Functional transfer training;LE strengthening/ROM;Therapeutic exercise;Endurance  training;Cognitive reorientation;Patient/family training;Equipment eval/education;Bed mobility;Gait training;Compensatory technique education   Progress Slow progress, decreased activity tolerance   PT Frequency 3-5x/wk   Discharge Recommendation   Rehab Resource Intensity Level, PT II (Moderate Resource Intensity)   Equipment Recommended Walker   Walker Package Recommended Wheeled walker   Change/add to Walker Package? No   AM-PAC Basic Mobility Inpatient   Turning in Flat Bed Without Bedrails 2   Lying on Back to Sitting on Edge of Flat Bed Without Bedrails 2   Moving Bed to Chair 2   Standing Up From Chair Using Arms 3   Walk in Room 2   Climb 3-5 Stairs With Railing 1   Basic Mobility Inpatient Raw Score 12   Basic Mobility Standardized Score 32.23   Highest Level Of Mobility   JH-HLM Goal 4: Move to chair/commode   JH-HLM Achieved 4: Move to chair/commode   Education   Education Provided Mobility training;Assistive device   Patient Demonstrates acceptance/verbal understanding;Demonstrates verbal understanding;Reinforcement needed   End of Consult   Patient Position at End of Consult Bedside chair;Bed/Chair alarm activated;All needs within reach  (RN arrived to room, updated)       The patient's AM-PAC Basic Mobility Inpatient Short Form Raw Score is 12. A Raw score of less than or equal to 16 suggests the patient may benefit from discharge to post-acute rehabilitation services. Please also refer to the recommendation of the Physical Therapist for safe discharge planning.    Pt will continue to benefit from skilled inpatient PT during this admission in order to facilitate progress towards goals and to maximize pt's independence w/ functional mobility.    DC rec: level II (Moderate Rehab Resource Intensity)      Purnima Michel, PT, DPT  02/01/24

## 2024-02-01 NOTE — CASE MANAGEMENT
Case Management Discharge Planning Note    Patient name Loida Duarte  Location ICU ICU  MRN [id number]  : 1945 Date 2024       Current Admission Date: 2024  Current Admission Diagnosis:Severe sepsis (HCC)   Patient Active Problem List    Diagnosis Date Noted    Heme positive stool 2024    Thrombocytopenia (HCC) 2024    Hypertension 2024    Right ureteral calculus 2024    Severe sepsis (HCC) 2024    Acute respiratory insufficiency 2024    RAKEL (acute kidney injury) (HCC) 2024    Elevated troponin 2024    Obstructive pyelonephritis 2024      LOS (days): 3  Geometric Mean LOS (GMLOS) (days): 9.9  Days to GMLOS:6.4     OBJECTIVE:  Risk of Unplanned Readmission Score: 13.9         Current admission status: Inpatient   Preferred Pharmacy: No Pharmacies Listed  Primary Care Provider: No primary care provider on file.    Primary Insurance: MEDICARE  Secondary Insurance: [employer] PENDING    DISCHARGE DETAILS:    Discharge planning discussed with:: pt and her daughter Nancy  Freedom of Choice: Yes    Contacts  Patient Contacts: Nancy Maria  Relationship to Patient:: Family  Contact Method: In Person  Reason/Outcome: Continuity of Care, Emergency Contact, Referral, Discharge Planning    Other Referral/Resources/Interventions Provided:  Interventions: Short Term Rehab  Referral Comments: CM met with pt and her daughterNancy, at bedside. They are aware referrals pending and CM will f/u with final list. They did want [facility] added. Pt's daughter did report that [facility] or [facility] is a preference for the pt/family. Facilities updated. Awaiting final response.    Treatment Team Recommendation: Short Term Rehab  Discharge Destination Plan:: Short Term Rehab

## 2024-02-01 NOTE — PROGRESS NOTES
"Progress Note - Loida Duarte 78 y.o. female MRN: [id number]    Unit/Bed#: ICU 01 Encounter: [id number]    Assessment and Plan:     78-year-old female with no recent healthcare maintenance for 50 years who presented with wheezing, nausea and vomiting found to have severe sepsis with bacteremia secondary to pyelonephritis, type II MI, plan for LUC today however this was canceled in the setting of dark stools and positive occult testing. Hgb 12.     Dark stools with positive stool occult  EGD performed yesterday, 1/31 with 3 cm hiatal hernia, esophagitis, small gastric and duodenal ulcer, no active bleeding noted.  Patient has no abdominal pain.  Her hemoglobin remained stable at 11.8.  She does report history of NSAID use prior to arrival.    -Discussed EGD findings with patient and daughter at bedside.  - Recommend PPI twice daily for 3 months with repeat EGD at that time.  - Discussed the importance of avoiding NSAIDs.  - Monitor hemoglobin and stool output.  - Message sent to the office for repeat EGD in 3 months.  - Please reach out to GI for any questions or concerns.  -Patient is on Plavix.    ----------------------------------------------------------------------------------------------------------------    Subjective:     Patient reports she is feeling well today.  Denies abdominal pain nausea or vomiting.  She is n.p.o. for scheduled cardio procedure.    Objective:     Vitals: Blood pressure 145/74, pulse 76, temperature 98.2 °F (36.8 °C), temperature source Tympanic, resp. rate 18, height 4' 9.01\" (1.448 m), weight 110 kg (243 lb 2.7 oz), SpO2 95%.,Body mass index is 52.61 kg/m².      Intake/Output Summary (Last 24 hours) at 2/1/2024 1340  Last data filed at 2/1/2024 0601  Gross per 24 hour   Intake --   Output 1175 ml   Net -1175 ml       Physical Exam:     General Appearance: Comfortable in bed.  Does not appear in distress.  Nasal cannula applied.  Lungs: Clear to auscultation bilaterally, no rales or " "rhonchi, no labored breathing/accessory muscle use  Heart: Regular rate and rhythm, S1, S2 normal, no murmur, click, rub or gallop  Abdomen: Soft, non-tender, non-distended; bowel sounds normal; no masses or no organomegaly. Benign  Extremities: No cyanosis, clubbing, or edema    Invasive Devices       Peripheral Intravenous Line  Duration             Peripheral IV Dorsal (posterior);Left Hand -- days    Peripheral IV 01/29/24 Left;Ventral (anterior) Forearm 2 days    Peripheral IV 01/30/24 Right;Upper;Ventral (anterior) Arm 2 days              Drain  Duration             Urethral Catheter Latex;Double-lumen 18 Fr. 3 days                    Lab Results:  Results from last 7 days   Lab Units 02/01/24  0502 01/30/24  0615 01/29/24  0543   WBC Thousand/uL 15.03*   < > 25.47*   HEMOGLOBIN g/dL 11.8   < > 12.8   HEMATOCRIT % 36.6   < > 39.1   PLATELETS Thousands/uL 86*   < > 121*   NEUTROS PCT %  --   --  86*   LYMPHS PCT %  --   --  2*   LYMPHO PCT % 7*   < >  --    MONOS PCT %  --   --  5   MONO PCT % 5   < >  --    EOS PCT % 1   < > 0    < > = values in this interval not displayed.     Results from last 7 days   Lab Units 02/01/24  0502 01/30/24  0615 01/29/24  0543   POTASSIUM mmol/L 4.2   < > 4.6   CHLORIDE mmol/L 105   < > 100   CO2 mmol/L 21   < > 23   BUN mg/dL 52*   < > 40*   CREATININE mg/dL 1.84*   < > 2.80*   CALCIUM mg/dL 7.8*   < > 7.9*   ALK PHOS U/L  --   --  60   ALT U/L  --   --  16   AST U/L  --   --  64*    < > = values in this interval not displayed.     Invalid input(s): \"BILI\"  Results from last 7 days   Lab Units 01/29/24  0748   INR  1.43*           Imaging Studies: I have personally reviewed pertinent imaging studies.    EGD    Result Date: 1/31/2024  Impression: No impression generated RECOMMENDATION:  Await pathology results  Schedule repeat EGD  Pantoprazole 40 mg twice daily Repeat EGD in 3 months    Rasta Hernandez MD     FL retrograde pyelogram    Result Date: 1/29/2024  Impression: " Fluoroscopic guidance provided for right retrograde pyelogram. Please see procedure report for further details. Workstation performed: [id number]     XR chest 1 view portable    Result Date: 1/29/2024  Impression: Enlarged cardiomediastinal silhouette. No apical pleural capping. Workstation performed: [id number]     CT chest abdomen pelvis wo contrast    Result Date: 1/29/2024  Impression: Trace right pleural fluid. Moderate cardiomegaly. 6 mm right UVJ calculus. Moderate right hydroureteronephrosis. Perinephric and periureteral stranding. Cholelithiasis. No radiographic evidence of cholecystitis. Colonic diverticulosis. Other findings, as per the body of the report. Preliminary report from [facility] was provided on 1/28/2024 at 11:35 p.m. Workstation performed: [id number]

## 2024-02-01 NOTE — PLAN OF CARE
Problem: OCCUPATIONAL THERAPY ADULT  Goal: Performs self-care activities at highest level of function for planned discharge setting.  See evaluation for individualized goals.  Description: Treatment Interventions: ADL retraining, Functional transfer training, Endurance training, Cognitive reorientation, Patient/family training, Equipment evaluation/education, Compensatory technique education, Energy conservation, Activityengagement          See flowsheet documentation for full assessment, interventions and recommendations.   Outcome: Progressing  Note: Limitation: Decreased ADL status, Decreased UE strength, Decreased Safe judgement during ADL, Decreased cognition, Decreased endurance, Decreased self-care trans, Decreased high-level ADLs (impaired pain, balance, fxnl mobility, act kellee, fxnl reach, trunk control, standing kellee, strength, fxnl sitting balance, fxnl sitting kellee, attention to task, direction following, safety awareness, insight, problem solving, learning new tasks)  Prognosis: Good  Assessment: Patient seen for OT treatment on 2/1/2024 s/p admission for Severe sepsis (HCC) Patient agreeable to OT session. Patient participated in fall prevention , self-care transfers, bed mobility , functional mobility, and ADLs with intervention focus on optimizing independence in mobility and ADLs. Loida Duarte is showing improvements in activity tolerance, standing tolerance and transfers but is continuing to perform below baseline due to the following deficits: endurance ,  decreased muscular strength , decreased standing tolerance for self care tasks , decreased functional reach , decreased activity tolerance , impaired judgement and problem solving , decreased emotional regulation and coping skills , (+) pain , and impaired global mental status. Personal factors continuing to impact D/C include: Assistance needed for ADL/IADLs, Assistance needed for ADLs and functional mobility, High fall risk , decreased insight  toward deficits , and decreased recall of precautions  From OT standpoint, patient would benefit from skilled intervention to maximize independence with ADLs and functional mobility. Goals remain appropriate, continue POC. At this time, recommending D/C to: level II (moderate resource intensity).     Rehab Resource Intensity Level, OT: II (Moderate Resource Intensity)     Jammie Green MS OTR/L   [place] Licensure# [id number]

## 2024-02-02 ENCOUNTER — TELEPHONE (OUTPATIENT)
Dept: UROLOGY | Facility: AMBULATORY SURGERY CENTER | Age: 79
End: 2024-02-02

## 2024-02-02 PROBLEM — D64.9 ANEMIA: Status: ACTIVE | Noted: 2024-02-02

## 2024-02-02 LAB
ANION GAP SERPL CALCULATED.3IONS-SCNC: 9 MMOL/L
BACTERIA BLD CULT: ABNORMAL
BUN SERPL-MCNC: 43 MG/DL (ref 5–25)
CA-I BLD-SCNC: 1.01 MMOL/L (ref 1.12–1.32)
CALCIUM SERPL-MCNC: 7.6 MG/DL (ref 8.4–10.2)
CHLORIDE SERPL-SCNC: 105 MMOL/L (ref 96–108)
CO2 SERPL-SCNC: 22 MMOL/L (ref 21–32)
CREAT SERPL-MCNC: 1.56 MG/DL (ref 0.6–1.3)
ERYTHROCYTE [DISTWIDTH] IN BLOOD BY AUTOMATED COUNT: 14.7 % (ref 11.6–15.1)
GFR SERPL CREATININE-BSD FRML MDRD: 31 ML/MIN/1.73SQ M
GLUCOSE SERPL-MCNC: 74 MG/DL (ref 65–140)
GRAM STN SPEC: ABNORMAL
HCT VFR BLD AUTO: 32.8 % (ref 34.8–46.1)
HGB BLD-MCNC: 10.7 G/DL (ref 11.5–15.4)
MAGNESIUM SERPL-MCNC: 2.1 MG/DL (ref 1.9–2.7)
MCH RBC QN AUTO: 31.6 PG (ref 26.8–34.3)
MCHC RBC AUTO-ENTMCNC: 32.6 G/DL (ref 31.4–37.4)
MCV RBC AUTO: 97 FL (ref 82–98)
PHOSPHATE SERPL-MCNC: 2.7 MG/DL (ref 2.3–4.1)
PLATELET # BLD AUTO: 99 THOUSANDS/UL (ref 149–390)
PMV BLD AUTO: 12.5 FL (ref 8.9–12.7)
POTASSIUM SERPL-SCNC: 3.9 MMOL/L (ref 3.5–5.3)
RBC # BLD AUTO: 3.39 MILLION/UL (ref 3.81–5.12)
SODIUM SERPL-SCNC: 136 MMOL/L (ref 135–147)
WBC # BLD AUTO: 12.13 THOUSAND/UL (ref 4.31–10.16)

## 2024-02-02 PROCEDURE — 99232 SBSQ HOSP IP/OBS MODERATE 35: CPT | Performed by: INTERNAL MEDICINE

## 2024-02-02 PROCEDURE — 99233 SBSQ HOSP IP/OBS HIGH 50: CPT | Performed by: INTERNAL MEDICINE

## 2024-02-02 PROCEDURE — 94640 AIRWAY INHALATION TREATMENT: CPT

## 2024-02-02 PROCEDURE — C9113 INJ PANTOPRAZOLE SODIUM, VIA: HCPCS

## 2024-02-02 PROCEDURE — 82330 ASSAY OF CALCIUM: CPT

## 2024-02-02 PROCEDURE — 80048 BASIC METABOLIC PNL TOTAL CA: CPT

## 2024-02-02 PROCEDURE — 85027 COMPLETE CBC AUTOMATED: CPT

## 2024-02-02 PROCEDURE — 84100 ASSAY OF PHOSPHORUS: CPT

## 2024-02-02 PROCEDURE — 94760 N-INVAS EAR/PLS OXIMETRY 1: CPT

## 2024-02-02 PROCEDURE — 83735 ASSAY OF MAGNESIUM: CPT

## 2024-02-02 PROCEDURE — 87040 BLOOD CULTURE FOR BACTERIA: CPT

## 2024-02-02 RX ORDER — SODIUM CHLORIDE, SODIUM GLUCONATE, SODIUM ACETATE, POTASSIUM CHLORIDE, MAGNESIUM CHLORIDE, SODIUM PHOSPHATE, DIBASIC, AND POTASSIUM PHOSPHATE .53; .5; .37; .037; .03; .012; .00082 G/100ML; G/100ML; G/100ML; G/100ML; G/100ML; G/100ML; G/100ML
50 INJECTION, SOLUTION INTRAVENOUS CONTINUOUS
Status: DISCONTINUED | OUTPATIENT
Start: 2024-02-02 | End: 2024-02-04

## 2024-02-02 RX ORDER — ALBUTEROL SULFATE 90 UG/1
2 AEROSOL, METERED RESPIRATORY (INHALATION) EVERY 6 HOURS
Status: DISCONTINUED | OUTPATIENT
Start: 2024-02-02 | End: 2024-02-05

## 2024-02-02 RX ORDER — PANTOPRAZOLE SODIUM 40 MG/1
40 TABLET, DELAYED RELEASE ORAL
Status: DISCONTINUED | OUTPATIENT
Start: 2024-02-02 | End: 2024-02-08 | Stop reason: HOSPADM

## 2024-02-02 RX ORDER — ECHINACEA PURPUREA EXTRACT 125 MG
1 TABLET ORAL
Status: DISCONTINUED | OUTPATIENT
Start: 2024-02-02 | End: 2024-02-03

## 2024-02-02 RX ADMIN — CEFTRIAXONE 2000 MG: 2 INJECTION, POWDER, FOR SOLUTION INTRAMUSCULAR; INTRAVENOUS at 13:25

## 2024-02-02 RX ADMIN — AMPICILLIN SODIUM 2000 MG: 2 INJECTION, POWDER, FOR SOLUTION INTRAVENOUS at 21:30

## 2024-02-02 RX ADMIN — CLOPIDOGREL BISULFATE 75 MG: 75 TABLET ORAL at 09:42

## 2024-02-02 RX ADMIN — HEPARIN SODIUM 5000 UNITS: 5000 INJECTION INTRAVENOUS; SUBCUTANEOUS at 21:30

## 2024-02-02 RX ADMIN — PANTOPRAZOLE SODIUM 40 MG: 40 INJECTION, POWDER, FOR SOLUTION INTRAVENOUS at 09:42

## 2024-02-02 RX ADMIN — HEPARIN SODIUM 5000 UNITS: 5000 INJECTION INTRAVENOUS; SUBCUTANEOUS at 14:33

## 2024-02-02 RX ADMIN — SODIUM CHLORIDE, SODIUM GLUCONATE, SODIUM ACETATE, POTASSIUM CHLORIDE, MAGNESIUM CHLORIDE, SODIUM PHOSPHATE, DIBASIC, AND POTASSIUM PHOSPHATE 75 ML/HR: .53; .5; .37; .037; .03; .012; .00082 INJECTION, SOLUTION INTRAVENOUS at 05:54

## 2024-02-02 RX ADMIN — CEFTRIAXONE 2000 MG: 2 INJECTION, POWDER, FOR SOLUTION INTRAMUSCULAR; INTRAVENOUS at 22:20

## 2024-02-02 RX ADMIN — PANTOPRAZOLE SODIUM 40 MG: 40 TABLET, DELAYED RELEASE ORAL at 17:14

## 2024-02-02 RX ADMIN — SODIUM CHLORIDE, SODIUM GLUCONATE, SODIUM ACETATE, POTASSIUM CHLORIDE, MAGNESIUM CHLORIDE, SODIUM PHOSPHATE, DIBASIC, AND POTASSIUM PHOSPHATE 50 ML/HR: .53; .5; .37; .037; .03; .012; .00082 INJECTION, SOLUTION INTRAVENOUS at 14:31

## 2024-02-02 RX ADMIN — CHLORHEXIDINE GLUCONATE 15 ML: 1.2 SOLUTION ORAL at 09:43

## 2024-02-02 RX ADMIN — AMPICILLIN SODIUM 2000 MG: 2 INJECTION, POWDER, FOR SOLUTION INTRAVENOUS at 17:13

## 2024-02-02 RX ADMIN — IPRATROPIUM BROMIDE 0.5 MG: 0.5 SOLUTION RESPIRATORY (INHALATION) at 13:04

## 2024-02-02 RX ADMIN — AMPICILLIN SODIUM 2000 MG: 2 INJECTION, POWDER, FOR SOLUTION INTRAVENOUS at 04:35

## 2024-02-02 RX ADMIN — ATORVASTATIN CALCIUM 80 MG: 40 TABLET, FILM COATED ORAL at 17:13

## 2024-02-02 RX ADMIN — ASPIRIN 81 MG: 81 TABLET, COATED ORAL at 09:42

## 2024-02-02 RX ADMIN — AMLODIPINE BESYLATE 10 MG: 10 TABLET ORAL at 09:42

## 2024-02-02 RX ADMIN — IPRATROPIUM BROMIDE 0.5 MG: 0.5 SOLUTION RESPIRATORY (INHALATION) at 07:01

## 2024-02-02 RX ADMIN — AMPICILLIN SODIUM 2000 MG: 2 INJECTION, POWDER, FOR SOLUTION INTRAVENOUS at 09:42

## 2024-02-02 RX ADMIN — LEVALBUTEROL HYDROCHLORIDE 1.25 MG: 1.25 SOLUTION RESPIRATORY (INHALATION) at 13:04

## 2024-02-02 RX ADMIN — HEPARIN SODIUM 5000 UNITS: 5000 INJECTION INTRAVENOUS; SUBCUTANEOUS at 06:01

## 2024-02-02 RX ADMIN — Medication 6 MG: at 21:30

## 2024-02-02 RX ADMIN — LEVALBUTEROL HYDROCHLORIDE 1.25 MG: 1.25 SOLUTION RESPIRATORY (INHALATION) at 07:01

## 2024-02-02 NOTE — ASSESSMENT & PLAN NOTE
Lab Results   Component Value Date    PLT 99 (L) 02/02/2024    PLT 86 (L) 02/01/2024    PLT 92 (L) 01/31/2024      - Continue to trend   - Likely secondary to sepsis  - Low suspicion for HIT

## 2024-02-02 NOTE — PROGRESS NOTES
The pantoprazole has been converted to Oral per [facility] IV-to-PO Auto-Conversion Protocol for Adults as approved by the Pharmacy and Therapeutics Committee. The patient met all eligible criteria:  1) Age = 18 years old   2) Received at least one dose of the IV form   3) Receiving at least one other scheduled oral/enteral medication   4) Tolerating an oral/enteral diet   and did not have any exclusions:   1) Critical care patient   2) Active GI bleed (IF assessing H2RAs or PPIs)   3) Continuous tube feeding (IF assessing cipro, doxycycline, levofloxacin, minocycline, rifampin, or voriconazole)   4) Receiving PO vancomycin (IF assessing metronidazole)   5) Persistent nausea and/or vomiting   6) Ileus or gastrointestinal obstruction   7) Bro/nasogastric tube set for continuous suction   8) Specific order not to automatically convert to PO (in the order's comments or if discussed in the most recent Infectious Disease or primary team's progress notes).

## 2024-02-02 NOTE — PROGRESS NOTES
NEPHROLOGY PROGRESS NOTE   Loida Duarte 78 y.o. female MRN: [id number]  Unit/Bed#: S -01 Encounter: [id number]  Reason for Consult: RAKEL      SUMMARY:    78 y.o. female who was admitted to [facility] after presenting with abdominal pain, fever, tachycardia and leukocytosis.. A renal consultation is requested today for assistance in the management of RAKEL and optimization for cardiac catheterization.     ASSESSMENT and PLAN:    Acute kidney injury  -- Present on admission, admission creatinine 2.58 mg/dL with a peak creatinine of 2.89 mg/dL  --Suspected prerenal acute kidney injury with a component of acute tubular necrosis in the setting of bacteremia along with obstruction as there is a moderate right-sided hydroureteronephrosis  --Urine analysis showed large leukocytes, 10-20 RBCs and innumerable WBCs.  Urine cultures growing out E. Coli  --Currently on balance crystalloid fluids with Isolyte  -- Renal function continues to improve creatinine down to 1.56 mg/dL  -- Continue Isolyte at 50 mL/h while the patient remains NPO.  Anticipate continuing intravenous fluids both pre and postcardiac catheterization which is tentatively planned for Monday    E. coli/Enterococcus bacteremia  -- Infectious disease on board  -- EGD was negative for bleeding source  -- Urine culture from cystoscopy positive for E faecalis  -- Continue gentle intravenous fluids  -- Currently on IV ampicillin 2 g every 6 hours the dose was in creased due to better renal function    Non-STEMI  -- Aspirin and Plavix were held due to concern of possible GI bleed now restarted.  Also on a statin  -- Received IV heparin  -- Cardiology on board  -- Planning for cardiac catheterization once sepsis resolved along with improvement of the renal function tentatively plan for Monday.    Dark stools/positive stool occult  -- Status post EGD, 3 cm hiatal hernia esophagitis small gastric and duodenal ulcer no active bleeding.  -- Currently  Pediatric Urology  [address] [place] [address], [facility] [address]  [address] [place] [address]  Phone: [phone]  Fax: [place], [place]        October 19, 2017     Carleen Beasley  [address]    Patient: Lissett Skaggs  MR Number: [id number]  YOB: 2016  Date of Visit: 10/19/2017    Dear Dr. Carleen Beasley:    Thank you for the request for consultation for Chas Burrell to me for the evaluation of L hydrocele. Below are the relevant portions of my assessment and plan of care. DATE OF PROCEDURE:  10/17/2017     ATTENDING PHYSICIAN:  Fallon Nieto MD     ASSISTANT:  Umang Correa MD     PREOPERATIVE DIAGNOSES:  Meatal stenosis and left hydrocele.     POSTOPERATIVE DIAGNOSES:  Meatal stenosis and left hydrocele.     OPERATIONS PERFORMED:  Left hydrocelectomy revision and meatoplasty.     ANESTHESIA:  General.          IMPRESSION   S/p Left recurrent hydrocele 10/17/17; s/p left hydrocele repair 4/5/16. Good repair of meatal stenosis    PLAN:    May bathe tomorrow    F/U with Dr. Walt Anne in 2 weeks        If you have questions, please do not hesitate to call me. I look forward to following Abbi Thomas along with you.     Sincerely,        GABRIEL COON, CNP on PPI    Right hydronephrosis/urosepsis  -- Status post right cystoscopy with retrograde pyelogram and insertion of a stent      SUBJECTIVE / INTERVAL HISTORY:    She is hungry and wants to eat.  No chest pain or shortness of breath    OBJECTIVE:  Current Weight: Weight - Scale: 111 kg (244 lb 11.4 oz)  Vitals:    02/02/24 0702 02/02/24 0718 02/02/24 0721 02/02/24 0942   BP:   153/71    Pulse:    70   Resp:   18    Temp:   98.7 °F (37.1 °C)    TempSrc:       SpO2: 94% 93%     Weight:       Height:           Intake/Output Summary (Last 24 hours) at 2/2/2024 1053  Last data filed at 2/2/2024 0443  Gross per 24 hour   Intake 551.25 ml   Output 1000 ml   Net -448.75 ml       Review of Systems:    Constitutional: Negative for chills and fever.   HENT: Negative for ear pain and sore throat.    Eyes: Negative for pain and visual disturbance.   Respiratory: Negative for cough and shortness of breath.    Cardiovascular: Negative for chest pain and palpitations.   Gastrointestinal: Negative for abdominal pain and vomiting.   Genitourinary: Negative for dysuria and hematuria.   Musculoskeletal: Negative for arthralgias and back pain.   Skin: Negative for color change and rash.   Neurological: Negative for seizures and syncope.   12 point ROS has been reviewed.    Physical Exam  Vitals and nursing note reviewed. Exam conducted with a chaperone present.   Constitutional:       General: She is not in acute distress.     Appearance: She is well-developed.   HENT:      Head: Normocephalic and atraumatic.   Eyes:      General: No scleral icterus.     Conjunctiva/sclera: Conjunctivae normal.      Pupils: Pupils are equal, round, and reactive to light.   Cardiovascular:      Rate and Rhythm: Normal rate and regular rhythm.      Heart sounds: S1 normal and S2 normal. No murmur heard.     No friction rub. No gallop.   Pulmonary:      Effort: Pulmonary effort is normal. No respiratory distress.      Breath sounds: Normal breath sounds.  No wheezing or rales.   Abdominal:      General: Bowel sounds are normal.      Palpations: Abdomen is soft.      Tenderness: There is no abdominal tenderness. There is no rebound.   Musculoskeletal:         General: Normal range of motion.      Cervical back: Normal range of motion and neck supple.   Skin:     General: Skin is dry.      Coloration: Skin is pale.      Findings: No rash.   Neurological:      Mental Status: She is alert and oriented to person, place, and time.   Psychiatric:         Behavior: Behavior normal.         Medications:    Current Facility-Administered Medications:     acetaminophen (TYLENOL) tablet 975 mg, 975 mg, Oral, Q8H PRN, Luiz Calle MD, 975 mg at 02/01/24 2019    albuterol (PROVENTIL HFA,VENTOLIN HFA) inhaler 2 puff, 2 puff, Inhalation, PRN, Luiz Calle MD    amLODIPine (NORVASC) tablet 10 mg, 10 mg, Oral, Daily, Luiz Calle MD, 10 mg at 02/02/24 0942    ampicillin (OMNIPEN) 2,000 mg in sodium chloride 0.9 % 100 mL IVPB, 2,000 mg, Intravenous, Q6H, Darren Carrasco MD, Last Rate: 200 mL/hr at 02/02/24 0942, 2,000 mg at 02/02/24 0942    aspirin (ECOTRIN LOW STRENGTH) EC tablet 81 mg, 81 mg, Oral, Daily, Luiz Calle MD, 81 mg at 02/02/24 0942    atorvastatin (LIPITOR) tablet 80 mg, 80 mg, Oral, QPM, Luiz Calle MD, 80 mg at 02/01/24 1719    cefTRIAXone (ROCEPHIN) 2,000 mg in dextrose 5 % 50 mL IVPB, 2,000 mg, Intravenous, Q12H, Luiz Calle MD, Last Rate: 100 mL/hr at 02/01/24 2228, 2,000 mg at 02/01/24 2228    chlorhexidine (PERIDEX) 0.12 % oral rinse 15 mL, 15 mL, Mouth/Throat, Q12H GUNJAN, Luiz Calle MD, 15 mL at 02/02/24 0943    clopidogrel (PLAVIX) tablet 75 mg, 75 mg, Oral, Daily, Luiz Calle MD, 75 mg at 02/02/24 0942    diphenhydrAMINE (BENADRYL) 25 mg in sodium chloride 0.9 % 50 mL IVPB, 25 mg, Intravenous, Q6H PRN, Luiz Calle MD    EPINEPHrine PF (ADRENALIN) 1 mg/mL injection 0.3 mg,  0.3 mg, Intramuscular, Once PRN, Luiz Calle MD    Famotidine (PF) (PEPCID) injection 20 mg, 20 mg, Intravenous, Once PRN, Luiz Calle MD    heparin (porcine) subcutaneous injection 5,000 Units, 5,000 Units, Subcutaneous, Q8H GUNJAN, Luiz Calle MD, 5,000 Units at 02/02/24 0601    hydrALAZINE (APRESOLINE) injection 10 mg, 10 mg, Intravenous, Q8H PRN, Luiz Calle MD    ipratropium (ATROVENT) 0.02 % inhalation solution 0.5 mg, 0.5 mg, Nebulization, TID, Luiz Calle MD, 0.5 mg at 02/02/24 0701    levalbuterol (XOPENEX) inhalation solution 1.25 mg, 1.25 mg, Nebulization, TID, Luiz Calle MD, 1.25 mg at 02/02/24 0701    melatonin tablet 6 mg, 6 mg, Oral, HS, Luiz Calle MD, 6 mg at 02/01/24 2224    pantoprazole (PROTONIX) injection 40 mg, 40 mg, Intravenous, Q12H GUNJAN, Luiz Calle MD, 40 mg at 02/02/24 0942    sodium chloride (OCEAN) 0.65 % nasal spray 1 spray, 1 spray, Each Nare, Q1H PRN, John Omer MD    trimethobenzamide (TIGAN) IM injection 200 mg, 200 mg, Intramuscular, Q6H PRN, Luiz Calle MD, 200 mg at 01/30/24 1958    Laboratory Results:  Results from last 7 days   Lab Units 02/02/24  0503 02/01/24  0502 01/31/24  0508 01/30/24  1802 01/30/24  0615 01/29/24  0543 01/29/24  0210 01/28/24  2126   WBC Thousand/uL 12.13* 15.03* 22.74*  --  30.35* 25.47* 30.32* 27.34*   HEMOGLOBIN g/dL 10.7* 11.8 12.3  --  13.5 12.8 12.6 13.7   HEMATOCRIT % 32.8* 36.6 38.4  --  41.0 39.1 39.2 42.3   PLATELETS Thousands/uL 99* 86* 92*  --  103* 121* 133* 148*   POTASSIUM mmol/L 3.9 4.2 4.5 4.6 4.0 4.6 3.9 3.7   CHLORIDE mmol/L 105 105 103 101 101 100 100 99   CO2 mmol/L 22 21 22 20* 22 23 23 23   BUN mg/dL 43* 52* 57* 57* 54* 40* 37* 34*   CREATININE mg/dL 1.56* 1.84* 2.26* 2.49* 2.89* 2.80* 2.75* 2.58*   CALCIUM mg/dL 7.6* 7.8* 8.5 8.8 8.6 7.9* 7.9* 8.8   MAGNESIUM mg/dL 2.1 2.2 2.2  --  2.3 2.2 1.5*  --    PHOSPHORUS mg/dL 2.7 2.8  3.4  --  3.9 4.3* 3.5  --        PLEASE NOTE:  This encounter was completed utilizing the [employer] Direct Speech Voice Recognition Software. Grammatical errors, random word insertions, pronoun errors and incomplete sentences are occasional consequences of the system due to software limitations, ambient noise and hardware issues.These may be missed by proof reading prior to affixing electronic signature. Any questions or concerns about the content, text or information contained within the body of this dictation should be directly addressed to the physician for clarification. Please do not hesitate to call me directly if you have any any questions or concerns.

## 2024-02-02 NOTE — TELEPHONE ENCOUNTER
As of 2/2/24  pt is still admitted. Holding 3/22/24 at the [facility]. I will call pt to schedule once she is discharged.

## 2024-02-02 NOTE — ASSESSMENT & PLAN NOTE
Lab Results   Component Value Date    CREATININE 1.56 (H) 02/02/2024    CREATININE 1.84 (H) 02/01/2024    CREATININE 2.26 (H) 01/31/2024      Initial sCr - 2.58  Unknown baseline sCr   Likely worsened in setting of severe sepsis, obstructing renal calculus  Received 500cc bolus in ED, additional fluids initially held given respiratory status, elevated BNP and concern for volume overload.     Plan:  Maintain navas catheter  Strict I&O  Avoid nephrotoxins and hypotension  Monitor and replete electrolytes  Repeat BMP daily - improving  Ordered gentle hydration which improved creatinine

## 2024-02-02 NOTE — PROGRESS NOTES
"General Cardiology   Progress Note -  Team One   Loida Duarte 78 y.o. female MRN: [id number]  Unit/Bed#: S -01 Encounter: [id number]    Assessment     Elevated troponin- likely type 2 MI in setting of sepsis and acute hypoxic respiratory failure  No c/o chest pain on admission. Admits to random episodes \"when my  stresses me out\"  Hs troponin trend: 14,797-->11,339-->12,709  ECG- sinus tachycardia with RBBB  TTE LVEF 65% with inferior wall hypokinesis, normal RV size/function, moderate mitral valve thickening with mild MR, and mild TR  CT notes coronary artery calcifications  Started on IV Heparin, ASA, statin, and Plavix  There was some concern for coffee ground emesis PTA but she also was eating black licorice which is what she thinks caused it. No evidence of acute bleeding initially but with + FOBT 1/31. S/p EGD with esophagitis but no bleeding.  S/p 48 hours of IV heparin  On ASA, Plavix, and statin     HTN  Presented septic with borderline low blood pressures. Now hypertensive. Started on amlodipine 10 mg daily by primary team on 2/1.  Last /71     Acute hypoxic respiratory failure  BNP 2737. CXR without congestion  On/off 2LNC over past 2 days  Does not appear overloaded on exam and creatinine improving with IVF     Urosepsis secondary to right ureteral calculus, bacteremia  Urine and blood cx growing E. Coli and enterococcus faecalis   On IV antibiotics per primary team/ID    RAKEL- creatinine improving, down to 1.5 today after IVF. Unknown baseline; peaked at 2.89. Suspect 2/2 severe sepsis and obstructing renal calculus     Dyslipidemia- , , HDL 37, . Started on atorvastatin 80 mg daily     Morbid obesity- BMI 52.94     Plan  Hs troponin elevation secondary to severe sepsis and hypoxia  Continue aspirin and Plavix now that upper GI bleeding has been ruled out  Strict I/Os, daily weights, am BMP  IVF per primary team/nephrology  LUC today if anesthesia available to " "evaluate for endocarditis   Tentative cardiac catheterization on 24  Continue telemetry monitoring    Subjective  No cardiac complaints. Wants to eat/drink.  Review of Systems   Constitutional: Negative for chills and malaise/fatigue.   Cardiovascular:  Negative for chest pain, dyspnea on exertion, leg swelling, orthopnea, palpitations and syncope.   Respiratory:  Positive for cough. Negative for shortness of breath, sleep disturbances due to breathing and sputum production.    Gastrointestinal:  Positive for bowel incontinence. Negative for bloating, nausea and vomiting.   Neurological:  Positive for weakness. Negative for dizziness and light-headedness.   Psychiatric/Behavioral:  Negative for altered mental status.    All other systems reviewed and are negative.    Objective:   Vitals: Blood pressure 153/71, pulse 65, temperature 98.7 °F (37.1 °C), resp. rate 18, height 4' 9.01\" (1.448 m), weight 111 kg (244 lb 11.4 oz), SpO2 93%., Body mass index is 52.94 kg/m².,     Systolic (24hrs), Av , Min:134 , Max:176     Diastolic (24hrs), Av, Min:60, Max:86    Intake/Output Summary (Last 24 hours) at 2024 0859  Last data filed at 2024 0443  Gross per 24 hour   Intake 551.25 ml   Output 1000 ml   Net -448.75 ml     Wt Readings from Last 3 Encounters:   24 111 kg (244 lb 11.4 oz)     Telemetry Review: NSR    Physical Exam  Vitals reviewed.   Constitutional:       General: She is not in acute distress.     Appearance: She is obese.   Neck:      Vascular: No hepatojugular reflux or JVD.   Cardiovascular:      Rate and Rhythm: Normal rate and regular rhythm.      Pulses: Normal pulses.      Heart sounds: Normal heart sounds. No murmur heard.     No friction rub. No gallop.   Pulmonary:      Effort: Pulmonary effort is normal. No respiratory distress.      Breath sounds: No rales.      Comments: Occasional expiratory wheezing, 2LNC  Abdominal:      General: Bowel sounds are normal. There is no " distension.      Palpations: Abdomen is soft.      Tenderness: There is no abdominal tenderness.   Musculoskeletal:         General: No tenderness. Normal range of motion.      Cervical back: Neck supple.      Right lower leg: No edema.      Left lower leg: No edema.   Skin:     General: Skin is warm and dry.      Findings: No erythema.   Neurological:      Mental Status: She is alert and oriented to person, place, and time.     LABORATORY RESULTS      CBC with diff:   Results from last 7 days   Lab Units 02/02/24  0503 02/01/24  0502 01/31/24  0508 01/30/24  0615 01/29/24  0543 01/29/24  0210 01/28/24  2126   WBC Thousand/uL 12.13* 15.03* 22.74* 30.35* 25.47* 30.32* 27.34*   HEMOGLOBIN g/dL 10.7* 11.8 12.3 13.5 12.8 12.6 13.7   HEMATOCRIT % 32.8* 36.6 38.4 41.0 39.1 39.2 42.3   MCV fL 97 99* 99* 98 99* 101* 98   PLATELETS Thousands/uL 99* 86* 92* 103* 121* 133* 148*   RBC Million/uL 3.39* 3.71* 3.88 4.18 3.94 3.90 4.32   MCH pg 31.6 31.8 31.7 32.3 32.5 32.3 31.7   MCHC g/dL 32.6 32.2 32.0 32.9 32.7 32.1 32.4   RDW % 14.7 14.6 14.6 14.5 14.2 14.3 13.9   MPV fL 12.5 13.0* 12.2 12.2 12.1 11.7 11.4   NRBC AUTO /100 WBCs  --   --   --   --  0  --   --      CMP:  Results from last 7 days   Lab Units 02/02/24  0503 02/01/24  0502 01/31/24  0508 01/30/24  1802 01/30/24  0615 01/29/24  0543 01/29/24  0210 01/28/24  2126   POTASSIUM mmol/L 3.9 4.2 4.5 4.6 4.0 4.6 3.9 3.7   CHLORIDE mmol/L 105 105 103 101 101 100 100 99   CO2 mmol/L 22 21 22 20* 22 23 23 23   BUN mg/dL 43* 52* 57* 57* 54* 40* 37* 34*   CREATININE mg/dL 1.56* 1.84* 2.26* 2.49* 2.89* 2.80* 2.75* 2.58*   CALCIUM mg/dL 7.6* 7.8* 8.5 8.8 8.6 7.9* 7.9* 8.8   AST U/L  --   --   --   --   --  64*  --  46*   ALT U/L  --   --   --   --   --  16  --  11   ALK PHOS U/L  --   --   --   --   --  60  --  69   EGFR ml/min/1.73sq m 31 25 19 17 14 15 15 16     BMP:  Results from last 7 days   Lab Units 02/02/24  0503 02/01/24  0502 01/31/24  0508 01/30/24  1802 01/30/24  0615  "01/29/24  0543 01/29/24  0210   POTASSIUM mmol/L 3.9 4.2 4.5 4.6 4.0 4.6 3.9   CHLORIDE mmol/L 105 105 103 101 101 100 100   CO2 mmol/L 22 21 22 20* 22 23 23   BUN mg/dL 43* 52* 57* 57* 54* 40* 37*   CREATININE mg/dL 1.56* 1.84* 2.26* 2.49* 2.89* 2.80* 2.75*   CALCIUM mg/dL 7.6* 7.8* 8.5 8.8 8.6 7.9* 7.9*     Lab Results   Component Value Date    CREATININE 1.56 (H) 02/02/2024    CREATININE 1.84 (H) 02/01/2024    CREATININE 2.26 (H) 01/31/2024      Results from last 7 days   Lab Units 02/02/24  0503 02/01/24  0502 01/31/24  0508 01/30/24  0615 01/29/24  0543 01/29/24  0210   MAGNESIUM mg/dL 2.1 2.2 2.2 2.3 2.2 1.5*      Results from last 7 days   Lab Units 01/29/24  0543   HEMOGLOBIN A1C % 5.3     Results from last 7 days   Lab Units 01/29/24  0748 01/28/24  2126   INR  1.43* 1.22*     Lipid Profile:   No results found for: \"CHOL\"  Lab Results   Component Value Date    HDL 37 (L) 01/29/2024     Lab Results   Component Value Date    LDLCALC 123 (H) 01/29/2024     Lab Results   Component Value Date    TRIG 223 (H) 01/29/2024     Meds/Allergies   all current active meds have been reviewed and current meds:   Current Facility-Administered Medications   Medication Dose Route Frequency    acetaminophen (TYLENOL) tablet 975 mg  975 mg Oral Q8H PRN    albuterol (PROVENTIL HFA,VENTOLIN HFA) inhaler 2 puff  2 puff Inhalation PRN    amLODIPine (NORVASC) tablet 10 mg  10 mg Oral Daily    ampicillin (OMNIPEN) 2,000 mg in sodium chloride 0.9 % 100 mL IVPB  2,000 mg Intravenous Q6H    aspirin (ECOTRIN LOW STRENGTH) EC tablet 81 mg  81 mg Oral Daily    atorvastatin (LIPITOR) tablet 80 mg  80 mg Oral QPM    cefTRIAXone (ROCEPHIN) 2,000 mg in dextrose 5 % 50 mL IVPB  2,000 mg Intravenous Q12H    chlorhexidine (PERIDEX) 0.12 % oral rinse 15 mL  15 mL Mouth/Throat Q12H GUNJAN    clopidogrel (PLAVIX) tablet 75 mg  75 mg Oral Daily    diphenhydrAMINE (BENADRYL) 25 mg in sodium chloride 0.9 % 50 mL IVPB  25 mg Intravenous Q6H PRN    " EPINEPHrine PF (ADRENALIN) 1 mg/mL injection 0.3 mg  0.3 mg Intramuscular Once PRN    Famotidine (PF) (PEPCID) injection 20 mg  20 mg Intravenous Once PRN    heparin (porcine) subcutaneous injection 5,000 Units  5,000 Units Subcutaneous Q8H GUNJAN    hydrALAZINE (APRESOLINE) injection 10 mg  10 mg Intravenous Q8H PRN    ipratropium (ATROVENT) 0.02 % inhalation solution 0.5 mg  0.5 mg Nebulization TID    levalbuterol (XOPENEX) inhalation solution 1.25 mg  1.25 mg Nebulization TID    melatonin tablet 6 mg  6 mg Oral HS    pantoprazole (PROTONIX) injection 40 mg  40 mg Intravenous Q12H GUNJAN    sodium chloride (OCEAN) 0.65 % nasal spray 1 spray  1 spray Each Nare Q1H PRN    trimethobenzamide (TIGAN) IM injection 200 mg  200 mg Intramuscular Q6H PRN     No medications prior to admission.     Counseling / Coordination of Care  Total floor / unit time spent today 20 minutes.  Greater than 50% of total time was spent with the patient and / or family counseling and / or coordination of care.      ** Please Note: Dragon 360 Dictation voice to text software may have been used in the creation of this document. **

## 2024-02-02 NOTE — ASSESSMENT & PLAN NOTE
As evidenced by initial fever (100.7), tachycardia (110), tachypnea (26), leukocytosis (30), Lactate 3.8  Source: Urosepsis, bacteremia  CT chest/ABD/pelvis - 6mm calculus in the right ureterovesical junction and moderate hydronephrosis. Signs of pyelonephritis  Procalcitonin - 230  Received 500cc resuscitation fluid in ED, not 30cc/kg given concerns for respiratory status as pt noted to be tachyneic, hypoxic and wheezing after receiving IVF  Blood cultures x 2 drawn - positive for E faecalis and E. coli  UA: +leukocytes, +WBC, occasional bacteria, negative nitrites  Received Vancomycin, Cefepime, Cipro in ED  Blood cultures positive for E coli and enterococcus faecalis, urine culture positive for gram negative rods     Plan:  Currently on ampicillin and ceftriaxone for synergy, duration of antibiotic therapy to be determined  LUC planned for today to evaluate for endocarditis given bacteremia  ID consulted, recommendations appreciated  Follow repeat blood cultures, obtained 2/2/2024  Trend WBC, fever curve - improving

## 2024-02-02 NOTE — ASSESSMENT & PLAN NOTE
CT chest/ABD/pelvis - 6mm calculus in the right ureterovesical junction and moderate hydronephrosis.  Urology consulted in ED and patient was taken to OR for cysto with stent placement on 1/29/24    Plan:  Continue Abx  Maintain navas catheter  Monitor I&O  Monitor renal function

## 2024-02-02 NOTE — PROGRESS NOTES
[facility]  Progress Note  Name: Loida Duarte I  MRN: [id number]  Unit/Bed#: S -01 I Date of Admission: 1/28/2024   Date of Service: 2/2/2024 I Hospital Day: 4    Assessment/Plan   * Severe sepsis (HCC)  Assessment & Plan  As evidenced by initial fever (100.7), tachycardia (110), tachypnea (26), leukocytosis (30), Lactate 3.8  Source: Urosepsis, bacteremia  CT chest/ABD/pelvis - 6mm calculus in the right ureterovesical junction and moderate hydronephrosis. Signs of pyelonephritis  Procalcitonin - 230  Received 500cc resuscitation fluid in ED, not 30cc/kg given concerns for respiratory status as pt noted to be tachyneic, hypoxic and wheezing after receiving IVF  Blood cultures x 2 drawn - positive for E faecalis and E. coli  UA: +leukocytes, +WBC, occasional bacteria, negative nitrites  Received Vancomycin, Cefepime, Cipro in ED  Blood cultures positive for E coli and enterococcus faecalis, urine culture positive for gram negative rods     Plan:  Currently on ampicillin and ceftriaxone for synergy, duration of antibiotic therapy to be determined  LUC planned for today to evaluate for endocarditis given bacteremia  ID consulted, recommendations appreciated  Follow repeat blood cultures, obtained 2/2/2024  Trend WBC, fever curve - improving       Elevated troponin  Assessment & Plan  Pt noted to be SOB, wheezing, tachypnea. Endorsed chest pain while in OR.  hsTroponin elevated  BNP - 2,737  CXR unremarkable  Cardiac echo 1/29 showed normal EF 65%, and grade 1 diastolic dysfunction with hypokinesis of basal inferoseptal and inferior walls   Concern for Type II MI per cardiology secondary to sepsis/hypoxia, although there is concern for CAD and she will eventually need cath     Plan:  Cardiology consulted, appreciate recommendations  Continue ASA/Plavix.  Subcu heparin for DVT prophylaxis  Cardiac cath during this hospitalization, after kidney function improves  Will attempt a LUC today  2/2/2024  Continue supplemental oxygen    Obstructive pyelonephritis  Assessment & Plan  Patient had right-sided ureterovesical calculus and hydronephrosis  Patient had urology consulted. Cystoscopy ureteroscopy and stent placement completed       Right ureteral calculus  Assessment & Plan  CT chest/ABD/pelvis - 6mm calculus in the right ureterovesical junction and moderate hydronephrosis.  Urology consulted in ED and patient was taken to OR for cysto with stent placement on 1/29/24    Plan:  Continue Abx  Maintain navas catheter  Monitor I&O  Monitor renal function    RAKEL (acute kidney injury) (HCC)  Assessment & Plan  Lab Results   Component Value Date    CREATININE 1.56 (H) 02/02/2024    CREATININE 1.84 (H) 02/01/2024    CREATININE 2.26 (H) 01/31/2024      Initial sCr - 2.58  Unknown baseline sCr   Likely worsened in setting of severe sepsis, obstructing renal calculus  Received 500cc bolus in ED, additional fluids initially held given respiratory status, elevated BNP and concern for volume overload.     Plan:  Maintain navas catheter  Strict I&O  Avoid nephrotoxins and hypotension  Monitor and replete electrolytes  Repeat BMP daily - improving  Ordered gentle hydration which improved creatinine    Acute respiratory insufficiency  Assessment & Plan  Pt presented with tachypnea, increased WOB and audible expiratory wheezing, SpO2 88% on room air.  No known pulmonary history, briefly smoked cigarettes many years ago.  Spo2 improved on 2L NC but wheezes persist. Albuterol x1 given in ED with minimal improvement.   LS diminished with wheeze in upper airway, no stridor  CXR - no acute cardiopulmonary abnormalities.  CT chest - No acute findings, no consolidation, pneumothorax, pleural effusion  BNP - 2,737  Initially thought to be volume overload, but now concern for undiagnosed COPD     Plan:  Continue supplemental oxygen for goal SpO2 > 88%, given likely undiagnosed COPD, currently on 1-2L NC  Suspect component of  undiagnosed sleep apnea, continue 2 L NC qhs, consider CPAP  Atrovent and Xopenex scheduled TID, patient feels improved  Encourage incentive spirometry       Anemia  Assessment & Plan  Lab Results   Component Value Date    HGB 10.7 (L) 02/02/2024    HGB 11.8 02/01/2024    HGB 12.3 01/31/2024    Plan as above    Heme positive stool  Assessment & Plan  Lab Results   Component Value Date    HGB 10.7 (L) 02/02/2024    HGB 11.8 02/01/2024    HGB 12.3 01/31/2024      - Patient had black, heme positive stools on AM of 1/31/24 during hospitalization   - GI consulted, appreciate recommendations   - Underwent EGD on 1/31/24 to rule out upper GI bleed, which revealed 2 ulcers (stomach and duodenum) but no active bleeding-negative for H. pylori    Plan:  Continue to monitor  Pantoprazole 4 mg twice daily  Repeat EGD in 3 months as outpatient    Hypertension  Assessment & Plan  - Likely undiagnosed chronic hypertension  - Consistently 190s/80s  - Started Amlodipine 5 mg daily   - Labetalol 10 mg or Hydralazine 5 mg PRN for SBP > 180       Thrombocytopenia (HCC)  Assessment & Plan  Lab Results   Component Value Date    PLT 99 (L) 02/02/2024    PLT 86 (L) 02/01/2024    PLT 92 (L) 01/31/2024      - Continue to trend   - Likely secondary to sepsis  - Low suspicion for HIT               VTE Pharmacologic Prophylaxis: VTE Score: 8 High Risk (Score >/= 5) - Pharmacological DVT Prophylaxis Ordered: heparin. Sequential Compression Devices Ordered.    Mobility:   Basic Mobility Inpatient Raw Score: 11  -HLM Goal: 4: Move to chair/commode  -HLM Achieved: 2: Bed activities/Dependent transfer  HLM Goal achieved. Continue to encourage appropriate mobility.    Patient Centered Rounds: I performed bedside rounds with nursing staff today.  Discussions with Specialists or Other Care Team Provider: Attending physician, senior resident, infectious disease, cardiology    Education and Discussions with Family / Patient: Patient declined call to  .  Stated family would be at bedside later in the day, and she would update.  Patient offered to come during that time to update family if they had any questions, expressed that she did contact us to her nurse.    Current Length of Stay: 4 day(s)  Current Patient Status: Inpatient   Discharge Plan: Anticipate discharge in >72 hrs to discharge location to be determined pending rehab evaluations.    Code Status: Level 1 - Full Code    Subjective:   Evaluated patient at bedside, patient stated that she was doing better but had very dry mouth and was hungry.  Explained to patient that she was n.p.o. this morning secondary to a possibility of LUC today.  Otherwise patient not complaining of any symptoms.  Denies any lightheadedness, dizziness, nausea, vomiting, fevers, chills, chest pains, shortness of breath, abdominal pains, diarrhea.    Objective:     Vitals:   Temp (24hrs), Av °F (37.2 °C), Min:98.7 °F (37.1 °C), Max:99.6 °F (37.6 °C)    Temp:  [98.7 °F (37.1 °C)-99.6 °F (37.6 °C)] 98.7 °F (37.1 °C)  HR:  [65-71] 70  Resp:  [18] 18  BP: (137-153)/(60-77) 153/71  SpO2:  [90 %-94 %] 93 %  Body mass index is 52.94 kg/m².     Input and Output Summary (last 24 hours):     Intake/Output Summary (Last 24 hours) at 2024 1524  Last data filed at 2024 0443  Gross per 24 hour   Intake 551.25 ml   Output 1000 ml   Net -448.75 ml       Physical Exam:   Physical Exam  Vitals and nursing note reviewed.   Constitutional:       General: She is not in acute distress.     Appearance: She is well-developed. She is obese.   HENT:      Head: Normocephalic and atraumatic.   Eyes:      Conjunctiva/sclera: Conjunctivae normal.   Cardiovascular:      Rate and Rhythm: Normal rate and regular rhythm.      Heart sounds: No murmur heard.  Pulmonary:      Effort: Pulmonary effort is normal. No respiratory distress.      Breath sounds: Normal breath sounds.      Comments: Auscultated anteriorly due to patient's body  habitus  Abdominal:      General: Bowel sounds are normal.      Palpations: Abdomen is soft.      Tenderness: There is no abdominal tenderness.   Musculoskeletal:         General: No swelling.   Skin:     General: Skin is warm and dry.   Neurological:      Mental Status: She is alert and oriented to person, place, and time.   Psychiatric:         Mood and Affect: Mood normal.          Additional Data:     Labs:  Results from last 7 days   Lab Units 02/02/24  0503 02/01/24  0502 01/31/24  0508 01/30/24  0615 01/29/24  0543   WBC Thousand/uL 12.13* 15.03* 22.74*   < > 25.47*   HEMOGLOBIN g/dL 10.7* 11.8 12.3   < > 12.8   HEMATOCRIT % 32.8* 36.6 38.4   < > 39.1   PLATELETS Thousands/uL 99* 86* 92*   < > 121*   BANDS PCT %  --   --  13*   < >  --    NEUTROS PCT %  --   --   --   --  86*   LYMPHS PCT %  --   --   --   --  2*   LYMPHO PCT %  --  7* 2*   < >  --    MONOS PCT %  --   --   --   --  5   MONO PCT %  --  5 5   < >  --    EOS PCT %  --  1 0   < > 0    < > = values in this interval not displayed.     Results from last 7 days   Lab Units 02/02/24  0503 01/30/24  0615 01/29/24  0543   SODIUM mmol/L 136   < > 134*   POTASSIUM mmol/L 3.9   < > 4.6   CHLORIDE mmol/L 105   < > 100   CO2 mmol/L 22   < > 23   BUN mg/dL 43*   < > 40*   CREATININE mg/dL 1.56*   < > 2.80*   ANION GAP mmol/L 9   < > 11   CALCIUM mg/dL 7.6*   < > 7.9*   ALBUMIN g/dL  --   --  3.3*   TOTAL BILIRUBIN mg/dL  --   --  0.81   ALK PHOS U/L  --   --  60   ALT U/L  --   --  16   AST U/L  --   --  64*   GLUCOSE RANDOM mg/dL 74   < > 74    < > = values in this interval not displayed.     Results from last 7 days   Lab Units 01/29/24  0748   INR  1.43*         Results from last 7 days   Lab Units 01/29/24  0543   HEMOGLOBIN A1C % 5.3     Results from last 7 days   Lab Units 01/29/24  0410 01/29/24  0210 01/29/24  0026 01/28/24  2126   LACTIC ACID mmol/L 1.9 2.4* 2.0 3.8*   PROCALCITONIN ng/ml  --   --   --  230.37*       Lines/Drains:  Invasive Devices        Peripheral Intravenous Line  Duration             Peripheral IV Dorsal (posterior);Left Hand -- days    Peripheral IV 02/01/24 Dorsal (posterior);Left Forearm <1 day              Drain  Duration             Urethral Catheter Latex;Double-lumen 18 Fr. 4 days                  Urinary Catheter:  Goal for removal: Voiding trial when ambulation improves           Telemetry:  Telemetry Orders (From admission, onward)               24 Hour Telemetry Monitoring  Continuous x 24 Hours (Telem)        Question:  Reason for 24 Hour Telemetry  Answer:  PCI/EP study (including pacer and ICD implementation), Cardiac surgery, MI, abnormal cardiac cath, and chest pain- rule out MI                     Telemetry Reviewed: Normal Sinus Rhythm  Indication for Continued Telemetry Use: Acute MI/Unstable Angina/Rule out ACS             Imaging: Reviewed radiology reports from this admission including: chest xray and abdominal/pelvic CT    Recent Cultures (last 7 days):   Results from last 7 days   Lab Units 02/02/24  0453 01/30/24  0909 01/30/24  0836 01/29/24  0154 01/28/24  2230 01/28/24  2131 01/28/24  2126   BLOOD CULTURE  Received in Microbiology Lab. Culture in Progress.  Received in Microbiology Lab. Culture in Progress. No Growth at 72 hrs. Enterococcus faecalis*  --   --  Escherichia coli*  Enterococcus faecalis* Escherichia coli*  Enterococcus faecalis*   GRAM STAIN RESULT   --   --  Gram positive cocci in pairs and chains*  --   --  Gram negative rods*  Gram positive cocci in pairs* Gram negative rods*  Gram positive cocci in pairs*   URINE CULTURE   --   --   --  10,000-19,000 cfu/ml Enterococcus faecalis* >100,000 cfu/ml Escherichia coli*  --   --        Last 24 Hours Medication List:   Current Facility-Administered Medications   Medication Dose Route Frequency Provider Last Rate    acetaminophen  975 mg Oral Q8H PRN Luiz Calle MD      albuterol  2 puff Inhalation PRN Luiz Calle MD       amLODIPine  10 mg Oral Daily Luiz Calle MD      ampicillin  2,000 mg Intravenous Q6H Darren Carrasco MD 2,000 mg (02/02/24 0942)    aspirin  81 mg Oral Daily Luiz Calle MD      atorvastatin  80 mg Oral QPM Luiz Calle MD      cefTRIAXone  2,000 mg Intravenous Q12H Luiz Calle MD 2,000 mg (02/02/24 1325)    chlorhexidine  15 mL Mouth/Throat Q12H GUNJAN Luiz Calle MD      clopidogrel  75 mg Oral Daily Luiz Calle MD      diphenhydrAMINE (BENADRYL) 25 mg in sodium chloride 0.9 % 50 mL IVPB  25 mg Intravenous Q6H PRN Luiz Calle MD      EPINEPHrine PF  0.3 mg Intramuscular Once PRN Luiz Calle MD      heparin (porcine)  5,000 Units Subcutaneous Q8H GUNJAN Luiz Calle MD      hydrALAZINE  10 mg Intravenous Q8H PRN Luiz Calle MD      ipratropium  0.5 mg Nebulization TID Luiz Calle MD      levalbuterol  1.25 mg Nebulization TID Luiz Calle MD      melatonin  6 mg Oral HS Luiz Calle MD      multi-electrolyte  50 mL/hr Intravenous Continuous Yolanda Hunter MD 50 mL/hr (02/02/24 1431)    pantoprazole  40 mg Oral BID AC Luiz Calle MD      sodium chloride  1 spray Each Nare Q1H PRN John Omer MD      trimethobenzamide  200 mg Intramuscular Q6H PRN Luiz Calle MD          Today, Patient Was Seen By: John Omer MD    **Please Note: This note may have been constructed using a voice recognition system.**

## 2024-02-02 NOTE — ASSESSMENT & PLAN NOTE
Lab Results   Component Value Date    HGB 10.7 (L) 02/02/2024    HGB 11.8 02/01/2024    HGB 12.3 01/31/2024    Plan as above

## 2024-02-02 NOTE — ASSESSMENT & PLAN NOTE
Pt noted to be SOB, wheezing, tachypnea. Endorsed chest pain while in OR.  hsTroponin elevated  BNP - 2,737  CXR unremarkable  Cardiac echo 1/29 showed normal EF 65%, and grade 1 diastolic dysfunction with hypokinesis of basal inferoseptal and inferior walls   Concern for Type II MI per cardiology secondary to sepsis/hypoxia, although there is concern for CAD and she will eventually need cath     Plan:  Cardiology consulted, appreciate recommendations  Continue ASA/Plavix.  Subcu heparin for DVT prophylaxis  Cardiac cath during this hospitalization, after kidney function improves  Will attempt a LUC today 2/2/2024  Continue supplemental oxygen

## 2024-02-02 NOTE — PROGRESS NOTES
Progress Note - Infectious Disease   Loida Duarte 78 y.o. female MRN: [id number]  Unit/Bed#: S -01 Encounter: [id number]      Impression/Plan:  Sepsis. POA. E/b fever to 100.7F, tachycardia, leukocytosis to 27, and lactic acidosis on admission. Most likely I/s/o #2, #3, and #4. CT C/A/P on admission showed a 6mm right distal ureteral calculus with right-sided hydronephrosis with perinephric and periureteral stranding. CT chest did not show any evidence of consolidation. Moderate cardiomegaly noted. COVID/Flu/RSV negative. UA obtained with innumerable WBC, large LE, and moderate blood c/f urinary source. Remains afebrile and HDS. WBC improved from 30 to 22 today. No new symptoms per patient. Urine culture from straight cath resulted positive for E.coli, Ucx from cystoscopy positive for E.faecalis. Now with c/f GI bleed. GI consulted for EGD.  EGD negative for a bleeding source.  Antibiotics as below  Recheck blood cultures as below  Supportive care  Monitoring for potential antimicrobial toxicity by following CBCD and CMP     Polymicrobial bacteremia. Blood cultures on admission positive both sets (2/2) for GNRs and GPCs in pairs identified as E.coli and E.faecalis. Most likely etiology #3 and #4. TTE obtained showed moderate thickening of the mitral valve. Favor LUC to further assess for endocarditis.  Repeat blood cultures positive for gram-positive cocci concerning for persistent Enterococcus bacteremia.  LUC delayed due to previous GI bleeding, now due to respiratory status.  Increase the IV ampicillin to 2 g IV every 6 hours with the improved renal function  Continue ceftriaxone for synergy in the setting of possible endocarditis  Follow-up ID and sensitivity of positive blood culture  Recheck blood cultures x 2 sets to make sure bacteremia clearing  Check transesophageal echocardiogram if felt to be safe to do so  Recheck CBC with differential and CMP to make sure not developing toxicity     UTI  complicated by pyelonephritis. Likely I/s/o #4. Likely etiology of #2. UA obtained in ED with innumerable WBC, large leukocytes, and moderate blood. CT A/P on admission showed #4 and perinephric and periureteral stranding. Ucx from straight cath resulted positive for E.coli, Ucx from cystoscopy positive for E.faecalis.   Antibiotics as above  Monitoring for potential antimicrobial toxicity by following CBCD and CMP     Right distal ureteral calculus c/b right-sided hydronephrosis. Likely etiology of #3. CT A/P on admission showed a 6mm right UVJ calculus. Now s/p OR on 1/29 for right ureteral stent placement with Urology.   Close urology follow-up.     Elevated troponin. Cardiology consulted. Suspected nonischemic myocardial injury in setting of sepsis. Started on IV Heparin, ASA, statin, and Plavix.   Management per cardiology     RAKEL. Scr elevated to 2.8 on admission. Unknown baseline as patient has not seen a doctor in ~50 years. Consider pre-renal I/s/o sepsis vs obstructive I/s/o renal calculus.  Renal function continues to improve  Dose adjust antibiotics as above  Volume management  Recheck BMP to see if need to dose adjust the antibiotics further     Acute hypoxic respiratory failure. POA. Noted to be 88% on room air. Also with diffuse wheezing on exam. CT chest demonstrated no acute pulmonary abnormalities. Showed cardiomegaly.  Still requiring oxygen support consider pulmonary edema.  Decreased oxygen needs  Continue to monitor respiratory status  O2 management per primary team   Low concern for pneumonia at this time     Penicillin allergy- resolved. Had an allergy listed as swelling. States she had a rash as a child, but has not had it since. Has not seen a doctor in 50 years. Passed an amoxicillin challenge on 1/30. Tolerating ampicillin without difficulty.   No longer a true allergy      Morbid obesity. BMI noted to be 51 on admission. May affect antibiotic dosing.     Discussed with the primary  service the plan to continue the intravenous ampicillin and ceftriaxone.  Also discussed with them the possible delay in the transesophageal echocardiogram until at least early next week to make sure respiratory status has remained stabilized.  They agree with the plan    Antibiotics:  Ampicillin 3  Ceftriaxone 2  Antibiotics 6    Subjective:  Patient has no fever, chills, sweats; no nausea, vomiting, diarrhea; no cough, shortness of breath; no pain. No new symptoms.  Transesophageal echocardiogram delayed due to respiratory status.  She now has decreased oxygen needs on oxygen by nasal cannula.    Objective:  Vitals:  Temp:  [98.2 °F (36.8 °C)-99.6 °F (37.6 °C)] 98.7 °F (37.1 °C)  HR:  [65-91] 65  Resp:  [18-38] 18  BP: (134-176)/(60-86) 153/71  SpO2:  [90 %-97 %] 93 %  Temp (24hrs), Av.8 °F (37.1 °C), Min:98.2 °F (36.8 °C), Max:99.6 °F (37.6 °C)  Current: Temperature: 98.7 °F (37.1 °C)    Physical Exam:   General Appearance:  Alert, interactive, nontoxic, no acute distress.   Throat: Oropharynx moist without lesions.    Lungs:   Decreased breath sounds bilaterally; no wheezes, rhonchi or rales; respirations unlabored   Heart:  RRR; no murmur, rub or gallop   Abdomen:   Soft, non-tender, non-distended, positive bowel sounds.     Extremities: No clubbing, cyanosis or edema   Skin: No new rashes or lesions. No draining wounds noted.       Labs, Imaging, & Other studies:   All pertinent labs and imaging studies were personally reviewed  Results from last 7 days   Lab Units 24  0503 24  0502 24  0508   WBC Thousand/uL 12.13* 15.03* 22.74*   HEMOGLOBIN g/dL 10.7* 11.8 12.3   PLATELETS Thousands/uL 99* 86* 92*     Results from last 7 days   Lab Units 24  0503 24  0502 24  0508 24  0615 24  0543 24  0210 24  2126   SODIUM mmol/L 136 135 134*   < > 134*   < > 135   POTASSIUM mmol/L 3.9 4.2 4.5   < > 4.6   < > 3.7   CHLORIDE mmol/L 105 105 103   < > 100   < >  99   CO2 mmol/L 22 21 22   < > 23   < > 23   BUN mg/dL 43* 52* 57*   < > 40*   < > 34*   CREATININE mg/dL 1.56* 1.84* 2.26*   < > 2.80*   < > 2.58*   EGFR ml/min/1.73sq m 31 25 19   < > 15   < > 16   CALCIUM mg/dL 7.6* 7.8* 8.5   < > 7.9*   < > 8.8   AST U/L  --   --   --   --  64*  --  46*   ALT U/L  --   --   --   --  16  --  11   ALK PHOS U/L  --   --   --   --  60  --  69    < > = values in this interval not displayed.     Results from last 7 days   Lab Units 01/30/24  0909 01/30/24  0836 01/29/24  1046 01/29/24  0154 01/28/24  2230 01/28/24  2131 01/28/24 2126   BLOOD CULTURE  No Growth at 48 hrs. Enterococcus faecalis*  --   --   --  Escherichia coli*  Enterococcus faecalis* Escherichia coli*  Enterococcus faecalis*   GRAM STAIN RESULT   --  Gram positive cocci in pairs and chains*  --   --   --  Gram negative rods*  Gram positive cocci in pairs* Gram negative rods*  Gram positive cocci in pairs*   URINE CULTURE   --   --   --  10,000-19,000 cfu/ml Enterococcus faecalis* >100,000 cfu/ml Escherichia coli*  --   --    MRSA CULTURE ONLY   --   --  No Methicillin Resistant Staphlyococcus aureus (MRSA) isolated  --   --   --   --      Results from last 7 days   Lab Units 01/28/24 2126   PROCALCITONIN ng/ml 230.37*

## 2024-02-02 NOTE — ASSESSMENT & PLAN NOTE
Lab Results   Component Value Date    HGB 10.7 (L) 02/02/2024    HGB 11.8 02/01/2024    HGB 12.3 01/31/2024      - Patient had black, heme positive stools on AM of 1/31/24 during hospitalization   - GI consulted, appreciate recommendations   - Underwent EGD on 1/31/24 to rule out upper GI bleed, which revealed 2 ulcers (stomach and duodenum) but no active bleeding-negative for H. pylori    Plan:  Continue to monitor  Pantoprazole 4 mg twice daily  Repeat EGD in 3 months as outpatient

## 2024-02-02 NOTE — ASSESSMENT & PLAN NOTE
Patient had right-sided ureterovesical calculus and hydronephrosis  Patient had urology consulted. Cystoscopy ureteroscopy and stent placement completed

## 2024-02-03 LAB
ALBUMIN SERPL BCP-MCNC: 2.8 G/DL (ref 3.5–5)
ALP SERPL-CCNC: 74 U/L (ref 34–104)
ALT SERPL W P-5'-P-CCNC: 11 U/L (ref 7–52)
ANION GAP SERPL CALCULATED.3IONS-SCNC: 7 MMOL/L
AST SERPL W P-5'-P-CCNC: 20 U/L (ref 13–39)
BASOPHILS # BLD MANUAL: 0 THOUSAND/UL (ref 0–0.1)
BASOPHILS NFR MAR MANUAL: 0 % (ref 0–1)
BILIRUB SERPL-MCNC: 0.47 MG/DL (ref 0.2–1)
BUN SERPL-MCNC: 31 MG/DL (ref 5–25)
CALCIUM ALBUM COR SERPL-MCNC: 8.2 MG/DL (ref 8.3–10.1)
CALCIUM SERPL-MCNC: 7.2 MG/DL (ref 8.4–10.2)
CHLORIDE SERPL-SCNC: 104 MMOL/L (ref 96–108)
CO2 SERPL-SCNC: 25 MMOL/L (ref 21–32)
CREAT SERPL-MCNC: 1.38 MG/DL (ref 0.6–1.3)
EOSINOPHIL # BLD MANUAL: 0.27 THOUSAND/UL (ref 0–0.4)
EOSINOPHIL NFR BLD MANUAL: 2 % (ref 0–6)
ERYTHROCYTE [DISTWIDTH] IN BLOOD BY AUTOMATED COUNT: 14.9 % (ref 11.6–15.1)
GFR SERPL CREATININE-BSD FRML MDRD: 36 ML/MIN/1.73SQ M
GLUCOSE SERPL-MCNC: 78 MG/DL (ref 65–140)
HCT VFR BLD AUTO: 34.6 % (ref 34.8–46.1)
HGB BLD-MCNC: 11.2 G/DL (ref 11.5–15.4)
LYMPHOCYTES # BLD AUTO: 0.96 THOUSAND/UL (ref 0.6–4.47)
LYMPHOCYTES # BLD AUTO: 7 % (ref 14–44)
MCH RBC QN AUTO: 31.9 PG (ref 26.8–34.3)
MCHC RBC AUTO-ENTMCNC: 32.4 G/DL (ref 31.4–37.4)
MCV RBC AUTO: 99 FL (ref 82–98)
MONOCYTES # BLD AUTO: 2.19 THOUSAND/UL (ref 0–1.22)
MONOCYTES NFR BLD: 16 % (ref 4–12)
MYELOCYTES NFR BLD MANUAL: 1 % (ref 0–1)
NEUTROPHILS # BLD MANUAL: 10.15 THOUSAND/UL (ref 1.85–7.62)
NEUTS BAND NFR BLD MANUAL: 1 % (ref 0–8)
NEUTS SEG NFR BLD AUTO: 73 % (ref 43–75)
PLATELET # BLD AUTO: 126 THOUSANDS/UL (ref 149–390)
PLATELET BLD QL SMEAR: ABNORMAL
PMV BLD AUTO: 12.2 FL (ref 8.9–12.7)
POTASSIUM SERPL-SCNC: 4.1 MMOL/L (ref 3.5–5.3)
PROT SERPL-MCNC: 5.6 G/DL (ref 6.4–8.4)
RBC # BLD AUTO: 3.51 MILLION/UL (ref 3.81–5.12)
RBC MORPH BLD: NORMAL
SODIUM SERPL-SCNC: 136 MMOL/L (ref 135–147)
WBC # BLD AUTO: 13.71 THOUSAND/UL (ref 4.31–10.16)

## 2024-02-03 PROCEDURE — 99232 SBSQ HOSP IP/OBS MODERATE 35: CPT | Performed by: INTERNAL MEDICINE

## 2024-02-03 PROCEDURE — 85007 BL SMEAR W/DIFF WBC COUNT: CPT | Performed by: INTERNAL MEDICINE

## 2024-02-03 PROCEDURE — 85027 COMPLETE CBC AUTOMATED: CPT | Performed by: INTERNAL MEDICINE

## 2024-02-03 PROCEDURE — NC001 PR NO CHARGE: Performed by: INTERNAL MEDICINE

## 2024-02-03 PROCEDURE — 80053 COMPREHEN METABOLIC PANEL: CPT | Performed by: INTERNAL MEDICINE

## 2024-02-03 RX ORDER — ECHINACEA PURPUREA EXTRACT 125 MG
2 TABLET ORAL DAILY
Status: DISCONTINUED | OUTPATIENT
Start: 2024-02-03 | End: 2024-02-08 | Stop reason: HOSPADM

## 2024-02-03 RX ORDER — AMLODIPINE BESYLATE 10 MG/1
10 TABLET ORAL DAILY
Status: DISCONTINUED | OUTPATIENT
Start: 2024-02-04 | End: 2024-02-08 | Stop reason: HOSPADM

## 2024-02-03 RX ORDER — CALCIUM GLUCONATE 20 MG/ML
1 INJECTION, SOLUTION INTRAVENOUS ONCE
Status: COMPLETED | OUTPATIENT
Start: 2024-02-03 | End: 2024-02-03

## 2024-02-03 RX ADMIN — ATORVASTATIN CALCIUM 80 MG: 40 TABLET, FILM COATED ORAL at 16:40

## 2024-02-03 RX ADMIN — AMLODIPINE BESYLATE 10 MG: 10 TABLET ORAL at 08:42

## 2024-02-03 RX ADMIN — ASPIRIN 81 MG: 81 TABLET, COATED ORAL at 08:42

## 2024-02-03 RX ADMIN — ACETAMINOPHEN 975 MG: 325 TABLET, FILM COATED ORAL at 15:49

## 2024-02-03 RX ADMIN — HYDRALAZINE HYDROCHLORIDE 10 MG: 20 INJECTION, SOLUTION INTRAMUSCULAR; INTRAVENOUS at 14:25

## 2024-02-03 RX ADMIN — AMPICILLIN SODIUM 2000 MG: 2 INJECTION, POWDER, FOR SOLUTION INTRAVENOUS at 16:40

## 2024-02-03 RX ADMIN — CEFTRIAXONE 2000 MG: 2 INJECTION, POWDER, FOR SOLUTION INTRAMUSCULAR; INTRAVENOUS at 22:18

## 2024-02-03 RX ADMIN — CLOPIDOGREL BISULFATE 75 MG: 75 TABLET ORAL at 08:42

## 2024-02-03 RX ADMIN — CALCIUM GLUCONATE 1 G: 20 INJECTION, SOLUTION INTRAVENOUS at 08:42

## 2024-02-03 RX ADMIN — Medication 6 MG: at 21:13

## 2024-02-03 RX ADMIN — HEPARIN SODIUM 5000 UNITS: 5000 INJECTION INTRAVENOUS; SUBCUTANEOUS at 13:04

## 2024-02-03 RX ADMIN — PANTOPRAZOLE SODIUM 40 MG: 40 TABLET, DELAYED RELEASE ORAL at 04:59

## 2024-02-03 RX ADMIN — SODIUM CHLORIDE, SODIUM GLUCONATE, SODIUM ACETATE, POTASSIUM CHLORIDE, MAGNESIUM CHLORIDE, SODIUM PHOSPHATE, DIBASIC, AND POTASSIUM PHOSPHATE 50 ML/HR: .53; .5; .37; .037; .03; .012; .00082 INJECTION, SOLUTION INTRAVENOUS at 12:11

## 2024-02-03 RX ADMIN — AMPICILLIN SODIUM 2000 MG: 2 INJECTION, POWDER, FOR SOLUTION INTRAVENOUS at 21:13

## 2024-02-03 RX ADMIN — CHLORHEXIDINE GLUCONATE 15 ML: 1.2 SOLUTION ORAL at 08:42

## 2024-02-03 RX ADMIN — ALBUTEROL SULFATE 2 PUFF: 90 AEROSOL, METERED RESPIRATORY (INHALATION) at 10:01

## 2024-02-03 RX ADMIN — HEPARIN SODIUM 5000 UNITS: 5000 INJECTION INTRAVENOUS; SUBCUTANEOUS at 04:59

## 2024-02-03 RX ADMIN — ALBUTEROL SULFATE 2 PUFF: 90 AEROSOL, METERED RESPIRATORY (INHALATION) at 16:40

## 2024-02-03 RX ADMIN — PANTOPRAZOLE SODIUM 40 MG: 40 TABLET, DELAYED RELEASE ORAL at 15:49

## 2024-02-03 RX ADMIN — SALINE NASAL SPRAY 2 SPRAY: 1.5 SOLUTION NASAL at 10:05

## 2024-02-03 RX ADMIN — ALBUTEROL SULFATE 2 PUFF: 90 AEROSOL, METERED RESPIRATORY (INHALATION) at 21:14

## 2024-02-03 RX ADMIN — HEPARIN SODIUM 5000 UNITS: 5000 INJECTION INTRAVENOUS; SUBCUTANEOUS at 21:14

## 2024-02-03 RX ADMIN — CEFTRIAXONE 2000 MG: 2 INJECTION, POWDER, FOR SOLUTION INTRAMUSCULAR; INTRAVENOUS at 09:59

## 2024-02-03 RX ADMIN — CHLORHEXIDINE GLUCONATE 15 ML: 1.2 SOLUTION ORAL at 21:14

## 2024-02-03 RX ADMIN — AMPICILLIN SODIUM 2000 MG: 2 INJECTION, POWDER, FOR SOLUTION INTRAVENOUS at 10:49

## 2024-02-03 RX ADMIN — AMPICILLIN SODIUM 2000 MG: 2 INJECTION, POWDER, FOR SOLUTION INTRAVENOUS at 04:59

## 2024-02-03 NOTE — PROGRESS NOTES
[facility]  Progress Note  Name: Loida Duarte I  MRN: [id number]  Unit/Bed#: S -01 I Date of Admission: 1/28/2024   Date of Service: 2/3/2024 I Hospital Day: 5    Assessment/Plan   * Severe sepsis (HCC)  Assessment & Plan  As evidenced by initial fever (100.7), tachycardia (110), tachypnea (26), leukocytosis (30), Lactate 3.8  Source: Urosepsis, bacteremia  CT chest/ABD/pelvis - 6mm calculus in the right ureterovesical junction and moderate hydronephrosis. Signs of pyelonephritis  Procalcitonin - 230  Received 500cc resuscitation fluid in ED, not 30cc/kg given concerns for respiratory status as pt noted to be tachyneic, hypoxic and wheezing after receiving IVF  Blood cultures x 2 drawn - positive for E faecalis and E. coli  UA: +leukocytes, +WBC, occasional bacteria, negative nitrites  Received Vancomycin, Cefepime, Cipro in ED  Blood cultures positive for E coli and enterococcus faecalis, urine culture positive for gram negative rods     Plan:  Currently on ampicillin and ceftriaxone for synergy, duration of antibiotic therapy to be determined  LUC to evaluate for endocarditis given bacteremia is planned on Monday.  ID consulted, recommendations appreciated  Follow repeat blood cultures, obtained 2/2/2024.  No growth at 24 hours  Trend WBC, fever curve - improving       Right ureteral calculus  Assessment & Plan  CT chest/ABD/pelvis - 6mm calculus in the right ureterovesical junction and moderate hydronephrosis.  Urology consulted in ED and patient was taken to OR for cysto with stent placement on 1/29/24    Plan:  Continue Abx  Maintain navas catheter  Monitor I&O  Monitor renal function    Anemia  Assessment & Plan  Lab Results   Component Value Date    HGB 11.2 (L) 02/03/2024    HGB 10.7 (L) 02/02/2024    HGB 11.8 02/01/2024    Plan as above    Hypertension  Assessment & Plan  - Likely undiagnosed chronic hypertension  - Consistently 190s/80s  - Started Amlodipine 5 mg daily    - Labetalol 10 mg or Hydralazine 5 mg PRN for SBP > 180       Thrombocytopenia (HCC)  Assessment & Plan  Lab Results   Component Value Date     (L) 02/03/2024    PLT 99 (L) 02/02/2024    PLT 86 (L) 02/01/2024   Platelets improved today    - Continue to trend   - Likely secondary to sepsis  - Low suspicion for HIT    Heme positive stool  Assessment & Plan  Lab Results   Component Value Date    HGB 11.2 (L) 02/03/2024    HGB 10.7 (L) 02/02/2024    HGB 11.8 02/01/2024      - Patient had black, heme positive stools on AM of 1/31/24 during hospitalization   - GI consulted, appreciate recommendations   - Underwent EGD on 1/31/24 to rule out upper GI bleed, which revealed 2 ulcers (stomach and duodenum) but no active bleeding-negative for H. pylori    Plan:  Continue to monitor  Pantoprazole 4 mg twice daily  Repeat EGD in 3 months as outpatient    Obstructive pyelonephritis  Assessment & Plan  Patient had right-sided ureterovesical calculus and hydronephrosis  Patient had urology consulted. Cystoscopy ureteroscopy and stent placement completed       Elevated troponin  Assessment & Plan  Pt noted to be SOB, wheezing, tachypnea. Endorsed chest pain while in OR.  hsTroponin elevated  BNP - 2,737  CXR unremarkable  Cardiac echo 1/29 showed normal EF 65%, and grade 1 diastolic dysfunction with hypokinesis of basal inferoseptal and inferior walls   Concern for Type II MI per cardiology secondary to sepsis/hypoxia, although there is concern for CAD and she will eventually need cath     Plan:  Cardiology consulted, appreciate recommendations  Continue ASA/Plavix.  Subcu heparin for DVT prophylaxis  Cardiac cath during this hospitalization, after kidney function improves  LUC today 2/5/2024  Continue supplemental oxygen    RAKEL (acute kidney injury) (HCC)  Assessment & Plan  Lab Results   Component Value Date    CREATININE 1.38 (H) 02/03/2024    CREATININE 1.56 (H) 02/02/2024    CREATININE 1.84 (H) 02/01/2024      Initial  sCr - 2.58  Unknown baseline sCr   Likely worsened in setting of severe sepsis, obstructing renal calculus  Received 500cc bolus in ED, additional fluids initially held given respiratory status, elevated BNP and concern for volume overload.     Today creatinine continue to improve    Plan:  Maintain navas catheter  Strict I&O  Avoid nephrotoxins and hypotension  Monitor and replete electrolytes  Repeat BMP daily - improving  Continue with gentle hydration which improved creatinine    Acute respiratory insufficiency  Assessment & Plan  Pt presented with tachypnea, increased WOB and audible expiratory wheezing, SpO2 88% on room air.  No known pulmonary history, briefly smoked cigarettes many years ago.  Spo2 improved on 2L NC but wheezes persist. Albuterol x1 given in ED with minimal improvement.   LS diminished with wheeze in upper airway, no stridor  CXR - no acute cardiopulmonary abnormalities.  CT chest - No acute findings, no consolidation, pneumothorax, pleural effusion  BNP - 2,737  Initially thought to be volume overload, but now concern for undiagnosed COPD     Plan:  Continue supplemental oxygen for goal SpO2 > 88%, given likely undiagnosed COPD, currently on 1-2L NC  Suspect component of undiagnosed sleep apnea, continue 2 L NC qhs, consider CPAP  Atrovent and Xopenex scheduled TID, patient feels improved  Encourage incentive spirometry                  VTE Pharmacologic Prophylaxis: VTE Score: 8 High Risk (Score >/= 5) - Pharmacological DVT Prophylaxis Ordered: heparin. Sequential Compression Devices Ordered.    Mobility:   Basic Mobility Inpatient Raw Score: 11  JH-HLM Goal: 4: Move to chair/commode  JH-HLM Achieved: 1: Laying in bed  HLM Goal achieved. Continue to encourage appropriate mobility.    Patient Centered Rounds: I performed bedside rounds with nursing staff today.  Discussions with Specialists or Other Care Team Provider: Attending physician, cardiology    Education and Discussions with Family  / Patient: Attempted to call patient's daughter, voicemail left    Current Length of Stay: 5 day(s)  Current Patient Status: Inpatient   Discharge Plan: Anticipate discharge in >72 hrs to discharge location to be determined pending rehab evaluations.    Code Status: Level 1 - Full Code    Subjective:   No acute events overnight, pt is hemodynamically stable. Examined pt at the bedside this am, pt states that she is feeling better. Pt express no new complains, admits fatigue.  Patient was explained that her procedure is postponed till Monday, patient expressed understanding    Denies any lightheadedness, dizziness, nausea, vomiting, fevers, chills, chest pains, shortness of breath, abdominal pains, diarrhea.    Objective:     Vitals:   Temp (24hrs), Av.2 °F (37.3 °C), Min:99.1 °F (37.3 °C), Max:99.4 °F (37.4 °C)    Temp:  [99.1 °F (37.3 °C)-99.4 °F (37.4 °C)] 99.2 °F (37.3 °C)  HR:  [90-97] 90  Resp:  [18-24] 24  BP: (126-180)/(68-84) 180/68  SpO2:  [94 %-95 %] 94 %  Body mass index is 53.51 kg/m².     Input and Output Summary (last 24 hours):     Intake/Output Summary (Last 24 hours) at 2/3/2024 1425  Last data filed at 2/3/2024 1258  Gross per 24 hour   Intake --   Output 1850 ml   Net -1850 ml       Physical Exam:   Physical Exam  Vitals and nursing note reviewed.   Constitutional:       General: She is not in acute distress.     Appearance: She is well-developed. She is obese.   HENT:      Head: Normocephalic and atraumatic.   Eyes:      Conjunctiva/sclera: Conjunctivae normal.   Cardiovascular:      Rate and Rhythm: Normal rate and regular rhythm.      Heart sounds: No murmur heard.  Pulmonary:      Effort: Pulmonary effort is normal. No respiratory distress.      Breath sounds: Normal breath sounds.   Abdominal:      General: Bowel sounds are normal.      Palpations: Abdomen is soft.      Tenderness: There is no abdominal tenderness.   Musculoskeletal:         General: No swelling.   Skin:     General: Skin  is warm and dry.   Neurological:      Mental Status: She is alert and oriented to person, place, and time.   Psychiatric:         Mood and Affect: Mood normal.          Additional Data:     Labs:  Results from last 7 days   Lab Units 02/03/24  0523 01/30/24  0615 01/29/24  0543   WBC Thousand/uL 13.71*   < > 25.47*   HEMOGLOBIN g/dL 11.2*   < > 12.8   HEMATOCRIT % 34.6*   < > 39.1   PLATELETS Thousands/uL 126*   < > 121*   BANDS PCT % 1   < >  --    NEUTROS PCT %  --   --  86*   LYMPHS PCT %  --   --  2*   LYMPHO PCT % 7*   < >  --    MONOS PCT %  --   --  5   MONO PCT % 16*   < >  --    EOS PCT % 2   < > 0    < > = values in this interval not displayed.     Results from last 7 days   Lab Units 02/03/24  0523   SODIUM mmol/L 136   POTASSIUM mmol/L 4.1   CHLORIDE mmol/L 104   CO2 mmol/L 25   BUN mg/dL 31*   CREATININE mg/dL 1.38*   ANION GAP mmol/L 7   CALCIUM mg/dL 7.2*   ALBUMIN g/dL 2.8*   TOTAL BILIRUBIN mg/dL 0.47   ALK PHOS U/L 74   ALT U/L 11   AST U/L 20   GLUCOSE RANDOM mg/dL 78     Results from last 7 days   Lab Units 01/29/24  0748   INR  1.43*         Results from last 7 days   Lab Units 01/29/24  0543   HEMOGLOBIN A1C % 5.3     Results from last 7 days   Lab Units 01/29/24  0410 01/29/24  0210 01/29/24  0026 01/28/24  2126   LACTIC ACID mmol/L 1.9 2.4* 2.0 3.8*   PROCALCITONIN ng/ml  --   --   --  230.37*       Lines/Drains:  Invasive Devices       Peripheral Intravenous Line  Duration             Peripheral IV 02/01/24 Dorsal (posterior);Left Forearm 1 day              Drain  Duration             Urethral Catheter Latex;Double-lumen 18 Fr. 5 days                  Urinary Catheter:  Goal for removal: Voiding trial when ambulation improves           Telemetry:  Telemetry Orders (From admission, onward)               24 Hour Telemetry Monitoring  Continuous x 24 Hours (Telem)        Question:  Reason for 24 Hour Telemetry  Answer:  PCI/EP study (including pacer and ICD implementation), Cardiac surgery, MI,  abnormal cardiac cath, and chest pain- rule out MI                     Telemetry Reviewed: Normal Sinus Rhythm  Indication for Continued Telemetry Use: Acute MI/Unstable Angina/Rule out ACS             Imaging: Reviewed radiology reports from this admission including: chest xray and abdominal/pelvic CT    Recent Cultures (last 7 days):   Results from last 7 days   Lab Units 02/02/24  0453 01/30/24  0909 01/30/24  0836 01/29/24  0154 01/28/24  2230 01/28/24  2131 01/28/24  2126   BLOOD CULTURE  No Growth at 24 hrs.  No Growth at 24 hrs. No Growth at 72 hrs. Enterococcus faecalis*  --   --  Escherichia coli*  Enterococcus faecalis* Escherichia coli*  Enterococcus faecalis*   GRAM STAIN RESULT   --   --  Gram positive cocci in pairs and chains*  --   --  Gram negative rods*  Gram positive cocci in pairs* Gram negative rods*  Gram positive cocci in pairs*   URINE CULTURE   --   --   --  10,000-19,000 cfu/ml Enterococcus faecalis* >100,000 cfu/ml Escherichia coli*  --   --        Last 24 Hours Medication List:   Current Facility-Administered Medications   Medication Dose Route Frequency Provider Last Rate    acetaminophen  975 mg Oral Q8H PRN Luiz Calle MD      albuterol  2 puff Inhalation Q6H Frank Reynolds MD      [START ON 2/4/2024] amLODIPine  10 mg Oral Daily Jefe Tenorio MD      ampicillin  2,000 mg Intravenous Q6H Darren Carrasco MD 2,000 mg (02/03/24 1049)    aspirin  81 mg Oral Daily Luiz Calle MD      atorvastatin  80 mg Oral QPM Luiz Calle MD      cefTRIAXone  2,000 mg Intravenous Q12H Luiz Calle MD 2,000 mg (02/03/24 0959)    chlorhexidine  15 mL Mouth/Throat Q12H [facility] Luiz Calle MD      clopidogrel  75 mg Oral Daily Luiz Calle MD      diphenhydrAMINE (BENADRYL) 25 mg in sodium chloride 0.9 % 50 mL IVPB  25 mg Intravenous Q6H PRN Luiz Calle MD      EPINEPHrine PF  0.3 mg Intramuscular Once PRN Luiz  MD Alva      heparin (porcine)  5,000 Units Subcutaneous Q8H GUNJAN Luiz Calle MD      hydrALAZINE  10 mg Intravenous Q8H PRN Luiz Calle MD      melatonin  6 mg Oral HS Luiz Calle MD      multi-electrolyte  50 mL/hr Intravenous Continuous Yolanda Hunter MD 50 mL/hr (02/03/24 1211)    pantoprazole  40 mg Oral BID AC Luiz Calle MD      sodium chloride  2 spray Each Nare Daily Yessica Geiger MD      trimethobenzamide  200 mg Intramuscular Q6H PRN Luiz Calle MD          Today, Patient Was Seen By: Yessica Geiger MD    **Please Note: This note may have been constructed using a voice recognition system.**

## 2024-02-03 NOTE — ASSESSMENT & PLAN NOTE
As evidenced by initial fever (100.7), tachycardia (110), tachypnea (26), leukocytosis (30), Lactate 3.8  Source: Urosepsis, bacteremia  CT chest/ABD/pelvis - 6mm calculus in the right ureterovesical junction and moderate hydronephrosis. Signs of pyelonephritis  Procalcitonin - 230  Received 500cc resuscitation fluid in ED, not 30cc/kg given concerns for respiratory status as pt noted to be tachyneic, hypoxic and wheezing after receiving IVF  Blood cultures x 2 drawn - positive for E faecalis and E. coli  UA: +leukocytes, +WBC, occasional bacteria, negative nitrites  Received Vancomycin, Cefepime, Cipro in ED  Blood cultures positive for E coli and enterococcus faecalis, urine culture positive for gram negative rods     Plan:  Currently on ampicillin and ceftriaxone for synergy, duration of antibiotic therapy to be determined  LUC to evaluate for endocarditis given bacteremia is planned on Monday.  ID consulted, recommendations appreciated  Follow repeat blood cultures, obtained 2/2/2024.  No growth at 48 hours  Trend WBC, fever curve

## 2024-02-03 NOTE — ASSESSMENT & PLAN NOTE
Lab Results   Component Value Date    HGB 11.3 (L) 02/04/2024    HGB 11.2 (L) 02/03/2024    HGB 10.7 (L) 02/02/2024    Plan as above

## 2024-02-03 NOTE — ASSESSMENT & PLAN NOTE
Lab Results   Component Value Date     02/04/2024     (L) 02/03/2024    PLT 99 (L) 02/02/2024   Platelets improved today    - Continue to trend   - Likely secondary to sepsis  - Low suspicion for HIT

## 2024-02-03 NOTE — ASSESSMENT & PLAN NOTE
Pt presented with tachypnea, increased WOB and audible expiratory wheezing, SpO2 88% on room air.  No known pulmonary history, briefly smoked cigarettes many years ago.  Spo2 improved on 2L NC but wheezes persist. Albuterol x1 given in ED with minimal improvement.   LS diminished with wheeze in upper airway, no stridor  CXR - no acute cardiopulmonary abnormalities.  CT chest - No acute findings, no consolidation, pneumothorax, pleural effusion  BNP - 2,737  Initially thought to be volume overload, but now concern for undiagnosed COPD     Plan:  Continue supplemental oxygen for goal SpO2 > 88%, given likely undiagnosed COPD, currently on 1-2L NC  Suspect component of undiagnosed sleep apnea, continue 2 L NC qhs, consider CPAP  Atrovent and Xopenex as needed TID, patient feels improved  Encourage incentive spirometry

## 2024-02-03 NOTE — ASSESSMENT & PLAN NOTE
Lab Results   Component Value Date    HGB 11.3 (L) 02/04/2024    HGB 11.2 (L) 02/03/2024    HGB 10.7 (L) 02/02/2024      - Patient had black, heme positive stools on AM of 1/31/24 during hospitalization   - GI consulted, appreciate recommendations   - Underwent EGD on 1/31/24 to rule out upper GI bleed, which revealed 2 ulcers (stomach and duodenum) but no active bleeding-negative for H. pylori    Plan:  Continue to monitor  Pantoprazole 4 mg twice daily  Repeat EGD in 3 months as outpatient

## 2024-02-03 NOTE — ASSESSMENT & PLAN NOTE
Pt noted to be SOB, wheezing, tachypnea. Endorsed chest pain while in OR.  hsTroponin elevated  BNP - 2,737  CXR unremarkable  Cardiac echo 1/29 showed normal EF 65%, and grade 1 diastolic dysfunction with hypokinesis of basal inferoseptal and inferior walls   Concern for Type II MI per cardiology secondary to sepsis/hypoxia, although there is concern for CAD and she will eventually need cath     Plan:  Cardiology consulted, appreciate recommendations  Continue ASA/Plavix.  Subcu heparin for DVT prophylaxis  Cardiac cath planned for 2/5/2024  LUC planned 2/5/2024  Continue supplemental oxygen, wean as tolerated

## 2024-02-03 NOTE — PROGRESS NOTES
Progress Note - Infectious Disease   Loida Duarte 78 y.o. female MRN: [id number]  Unit/Bed#: S -01 Encounter: [id number]      Impression/Plan:  Sepsis. POA. E/b fever to 100.7F, tachycardia, leukocytosis to 27, and lactic acidosis on admission. Most likely I/s/o #2, #3, and #4. CT C/A/P on admission showed a 6mm right distal ureteral calculus with right-sided hydronephrosis with perinephric and periureteral stranding. CT chest did not show any evidence of consolidation. Moderate cardiomegaly noted. COVID/Flu/RSV negative. UA obtained with innumerable WBC, large LE, and moderate blood c/f urinary source. Remains afebrile and HDS. WBC improved from 30 to 22 today. No new symptoms per patient. Urine culture from straight cath resulted positive for E.coli, Ucx from cystoscopy positive for E.faecalis. Now with c/f GI bleed. GI consulted for EGD.  EGD negative for a bleeding source.  Repeat blood cultures negative thus far  Antibiotics as below  Recheck blood cultures as below  Supportive care  Monitoring for potential antimicrobial toxicity by following CBCD and CMP     Polymicrobial bacteremia. Blood cultures on admission positive both sets (2/2) for GNRs and GPCs in pairs identified as E.coli and E.faecalis. Most likely etiology #3 and #4. TTE obtained showed moderate thickening of the mitral valve. Favor LUC to further assess for endocarditis.  Repeat blood cultures positive for gram-positive cocci concerning for persistent Enterococcus bacteremia.  LUC delayed due to previous GI bleeding, now due to respiratory status.  Increase the IV ampicillin   Continue ceftriaxone for synergy in the setting of possible endocarditis  Follow-up ID and sensitivity of positive blood culture  Recheck blood cultures x 2 sets to make sure bacteremia clearing  Check transesophageal echocardiogram if felt to be safe to do so  Recheck CBC with differential and CMP to make sure not developing toxicity     UTI complicated by  pyelonephritis. Likely I/s/o #4. Likely etiology of #2. UA obtained in ED with innumerable WBC, large leukocytes, and moderate blood. CT A/P on admission showed #4 and perinephric and periureteral stranding. Ucx from straight cath resulted positive for E.coli, Ucx from cystoscopy positive for E.faecalis.   Antibiotics as above  Monitoring for potential antimicrobial toxicity by following CBCD and CMP     Right distal ureteral calculus c/b right-sided hydronephrosis. Likely etiology of #3. CT A/P on admission showed a 6mm right UVJ calculus. Now s/p OR on 1/29 for right ureteral stent placement with Urology.   Close urology follow-up.     Elevated troponin. Cardiology consulted. Suspected nonischemic myocardial injury in setting of sepsis. Started on IV Heparin, ASA, statin, and Plavix.   Management per cardiology     RAKEL. Scr elevated to 2.8 on admission. Unknown baseline as patient has not seen a doctor in ~50 years. Consider pre-renal I/s/o sepsis vs obstructive I/s/o renal calculus.  Renal function continues to improve  Dose adjust antibiotics as needed  Volume management  Recheck BMP to see if need to dose adjust the antibiotics further     Acute hypoxic respiratory failure. POA. Noted to be 88% on room air. Also with diffuse wheezing on exam. CT chest demonstrated no acute pulmonary abnormalities. Showed cardiomegaly.  Still requiring oxygen support consider pulmonary edema.  Decreased oxygen needs  Continue to monitor respiratory status  O2 management per primary team   Low concern for pneumonia at this time     Penicillin allergy- resolved. Had an allergy listed as swelling. States she had a rash as a child, but has not had it since. Has not seen a doctor in 50 years. Passed an amoxicillin challenge on 1/30. Tolerating ampicillin without difficulty.   No longer a true allergy      Morbid obesity. BMI noted to be 51 on admission. May affect antibiotic dosing.     Discussed with the primary service the plan to  continue the ampicillin and ceftriaxone for now awaiting additional data.  They agree with the plan.    Antibiotics:  Ampicillin 4  Ceftriaxone 3  Antibiotics 7    Subjective:  Patient has no fever, chills, sweats; no nausea, vomiting, diarrhea; no cough, shortness of breath; no pain. No new symptoms.    Objective:  Vitals:  Temp:  [99.1 °F (37.3 °C)-99.4 °F (37.4 °C)] 99.2 °F (37.3 °C)  HR:  [90-97] 90  Resp:  [18-24] 24  BP: (126-174)/(77-84) 174/84  SpO2:  [93 %-95 %] 94 %  Temp (24hrs), Av.2 °F (37.3 °C), Min:99.1 °F (37.3 °C), Max:99.4 °F (37.4 °C)  Current: Temperature: 99.2 °F (37.3 °C)    Physical Exam:   General Appearance:  Alert, interactive, nontoxic, no acute distress.   Throat: Oropharynx moist without lesions.    Lungs:   Clear to auscultation bilaterally; no wheezes, rhonchi or rales; respirations unlabored   Heart:  RRR; no murmur, rub or gallop   Abdomen:   Soft, non-tender, non-distended, positive bowel sounds.     Extremities: No clubbing, cyanosis or edema   Skin: No new rashes or lesions. No draining wounds noted.       Labs, Imaging, & Other studies:   All pertinent labs and imaging studies were personally reviewed  Results from last 7 days   Lab Units 24  0523 24  0503 24  0502   WBC Thousand/uL 13.71* 12.13* 15.03*   HEMOGLOBIN g/dL 11.2* 10.7* 11.8   PLATELETS Thousands/uL 126* 99* 86*     Results from last 7 days   Lab Units 24  0523 24  0503 24  0502 24  0615 24  0543 24  0210 24  2126   SODIUM mmol/L 136 136 135   < > 134*   < > 135   POTASSIUM mmol/L 4.1 3.9 4.2   < > 4.6   < > 3.7   CHLORIDE mmol/L 104 105 105   < > 100   < > 99   CO2 mmol/L 25 22 21   < > 23   < > 23   BUN mg/dL 31* 43* 52*   < > 40*   < > 34*   CREATININE mg/dL 1.38* 1.56* 1.84*   < > 2.80*   < > 2.58*   EGFR ml/min/1.73sq m 36 31 25   < > 15   < > 16   CALCIUM mg/dL 7.2* 7.6* 7.8*   < > 7.9*   < > 8.8   AST U/L 20  --   --   --  64*  --  46*   ALT U/L  11  --   --   --  16  --  11   ALK PHOS U/L 74  --   --   --  60  --  69    < > = values in this interval not displayed.     Results from last 7 days   Lab Units 02/02/24  0453 01/30/24  0909 01/30/24  0836 01/29/24  1046 01/29/24  0154 01/28/24  2230 01/28/24  2131 01/28/24 2126   BLOOD CULTURE  No Growth at 24 hrs.  No Growth at 24 hrs. No Growth at 72 hrs. Enterococcus faecalis*  --   --   --  Escherichia coli*  Enterococcus faecalis* Escherichia coli*  Enterococcus faecalis*   GRAM STAIN RESULT   --   --  Gram positive cocci in pairs and chains*  --   --   --  Gram negative rods*  Gram positive cocci in pairs* Gram negative rods*  Gram positive cocci in pairs*   URINE CULTURE   --   --   --   --  10,000-19,000 cfu/ml Enterococcus faecalis* >100,000 cfu/ml Escherichia coli*  --   --    MRSA CULTURE ONLY   --   --   --  No Methicillin Resistant Staphlyococcus aureus (MRSA) isolated  --   --   --   --      Results from last 7 days   Lab Units 01/28/24 2126   PROCALCITONIN ng/ml 230.37*

## 2024-02-03 NOTE — PROGRESS NOTES
Cardiology Progress Note - Loida Duarte 78 y.o. female MRN: [id number]    Unit/Bed#: S -01 Encounter: [id number]      Assessment/Recommendations:  1.  Elevated troponin: Likely type II MI in the setting of sepsis and hypoxic respiratory failure with new wall motion abnormality seen on echocardiogram.  Status post IV heparin for 48 hours.  Aspirin and Plavix had been held due to possible GI bleed, now resumed.  Continue on statin.  Patient will require cardiac catheterization, once recovered from sepsis and renal function improves -likely Monday.  Echocardiogram revealed normal LV function but with hypokinesis of basal IS and inferior walls.  Reports no chest pain overnight.  2.  Acute hypoxic respiratory failure: Agree with holding off on diuretics due to active sepsis and renal failure.  Currently appears to be euvolemic, status post IVF.  Renal function also seems to be improving slowly.  Continues on 2-3 L nasal cannula oxygen support, continues to improve.  3.  Urosepsis with right ureteral calculus: Continued on IV antibiotics.  4.  Acute kidney injury: Likely secondary to sepsis and obstructing renal calculus.  Improving levels with IVF.  Will continue to monitor renal function over the course of the weekend and plan for cardiac catheterization on Monday if renal recovery is established.  5.  Dyslipidemia: Continued on statin.  6.  Morbid obesity: With BMI of 52  7.  Bacteremia: Anesthesia deemed the patient too high risk to proceed with LUC on Thursday.  We will attempt to complete on Monday.  There was some increased mitral valve leaflet thickening seen on transthoracic echocardiogram.  8.  Fecal occult positive stool: Likely from gastritis as per EGD.    Subjective:   Patient seen and examined.  No significant events overnight.  ; pertinent negatives - chest pain, chest pressure/discomfort, dyspnea, irregular heart beat, lower extremity edema, and palpitations.    Objective:     Vitals: Blood  "pressure (!) 174/84, pulse 97, temperature 99.2 °F (37.3 °C), resp. rate (!) 24, height 4' 9.01\" (1.448 m), weight 112 kg (247 lb 5.7 oz), SpO2 94%., Body mass index is 53.51 kg/m².,   Orthostatic Blood Pressures      Flowsheet Row Most Recent Value   Blood Pressure 174/84 filed at 02/03/2024 0757   Patient Position - Orthostatic VS Lying filed at 02/02/2024 2304              Intake/Output Summary (Last 24 hours) at 2/3/2024 0902  Last data filed at 2/2/2024 1901  Gross per 24 hour   Intake --   Output 850 ml   Net -850 ml       TELE: No significant arrhythmias seen.    Physical Exam:    GEN: Loida Duarte appears well, alert and oriented x 3, pleasant and cooperative   HEENT: pupils equal, round, and reactive to light; extraocular muscles intact  NECK: supple, no carotid bruits   HEART: regular rhythm, normal S1 and S2, + systolic murmur, no clicks, gallops or rubs   LUNGS: clear to auscultation bilaterally; no wheezes, rales, or rhonchi   ABDOMEN: normal bowel sounds, soft, no tenderness, no distention  EXTREMITIES: peripheral pulses normal; no clubbing, cyanosis, or edema  NEURO: no focal findings   SKIN: normal without suspicious lesions on exposed skin    Medications:      Current Facility-Administered Medications:     acetaminophen (TYLENOL) tablet 975 mg, 975 mg, Oral, Q8H PRN, Luiz Calle MD, 975 mg at 02/01/24 2019    albuterol (PROVENTIL HFA,VENTOLIN HFA) inhaler 2 puff, 2 puff, Inhalation, Q6H, Frank Reynolds MD    amLODIPine (NORVASC) tablet 10 mg, 10 mg, Oral, Daily, Luiz Calle MD, 10 mg at 02/03/24 0842    ampicillin (OMNIPEN) 2,000 mg in sodium chloride 0.9 % 100 mL IVPB, 2,000 mg, Intravenous, Q6H, Darren Carrasco MD, Last Rate: 200 mL/hr at 02/03/24 0459, 2,000 mg at 02/03/24 0459    aspirin (ECOTRIN LOW STRENGTH) EC tablet 81 mg, 81 mg, Oral, Daily, Luiz Calle MD, 81 mg at 02/03/24 0842    atorvastatin (LIPITOR) tablet 80 mg, 80 mg, Oral, QPM, Luiz " MD Alva, 80 mg at 02/02/24 1713    calcium gluconate 1 g in sodium chloride 0.9% 50 mL (premix), 1 g, Intravenous, Once, Yessica Geiger MD, Last Rate: 100 mL/hr at 02/03/24 0842, 1 g at 02/03/24 0842    cefTRIAXone (ROCEPHIN) 2,000 mg in dextrose 5 % 50 mL IVPB, 2,000 mg, Intravenous, Q12H, Luiz Calle MD, Last Rate: 100 mL/hr at 02/02/24 2220, 2,000 mg at 02/02/24 2220    chlorhexidine (PERIDEX) 0.12 % oral rinse 15 mL, 15 mL, Mouth/Throat, Q12H GUNJAN, Luiz Calle MD, 15 mL at 02/03/24 0842    clopidogrel (PLAVIX) tablet 75 mg, 75 mg, Oral, Daily, Luiz Calle MD, 75 mg at 02/03/24 0842    diphenhydrAMINE (BENADRYL) 25 mg in sodium chloride 0.9 % 50 mL IVPB, 25 mg, Intravenous, Q6H PRN, Luiz Calle MD    EPINEPHrine PF (ADRENALIN) 1 mg/mL injection 0.3 mg, 0.3 mg, Intramuscular, Once PRN, Luiz Calle MD    heparin (porcine) subcutaneous injection 5,000 Units, 5,000 Units, Subcutaneous, Q8H GUNJAN, Luiz Calle MD, 5,000 Units at 02/03/24 0459    hydrALAZINE (APRESOLINE) injection 10 mg, 10 mg, Intravenous, Q8H PRN, Luiz Calle MD    melatonin tablet 6 mg, 6 mg, Oral, HS, Luiz Calle MD, 6 mg at 02/02/24 2130    multi-electrolyte (PLASMALYTE-A/ISOLYTE-S PH 7.4) IV solution, 50 mL/hr, Intravenous, Continuous, Yolanda Hunter MD, Last Rate: 50 mL/hr at 02/02/24 1431, 50 mL/hr at 02/02/24 1431    pantoprazole (PROTONIX) EC tablet 40 mg, 40 mg, Oral, BID AC, Luiz Calle MD, 40 mg at 02/03/24 0459    sodium chloride (OCEAN) 0.65 % nasal spray 1 spray, 1 spray, Each Nare, Q1H PRN, John Omer MD    trimethobenzamide (TIGAN) IM injection 200 mg, 200 mg, Intramuscular, Q6H PRN, Luiz Calle MD, 200 mg at 01/30/24 1958     Labs & Results:        Results from last 7 days   Lab Units 02/03/24  0523 02/02/24  0503 02/01/24  0502   WBC Thousand/uL 13.71* 12.13* 15.03*   HEMOGLOBIN g/dL 11.2* 10.7* 11.8    HEMATOCRIT % 34.6* 32.8* 36.6   PLATELETS Thousands/uL 126* 99* 86*     Results from last 7 days   Lab Units 01/29/24  0543   TRIGLYCERIDES mg/dL 223*   HDL mg/dL 37*     Results from last 7 days   Lab Units 02/03/24  0523 02/02/24  0503 02/01/24  0502 01/30/24  0615 01/29/24  0543 01/29/24  0210 01/28/24 2126   POTASSIUM mmol/L 4.1 3.9 4.2   < > 4.6   < > 3.7   CHLORIDE mmol/L 104 105 105   < > 100   < > 99   CO2 mmol/L 25 22 21   < > 23   < > 23   BUN mg/dL 31* 43* 52*   < > 40*   < > 34*   CREATININE mg/dL 1.38* 1.56* 1.84*   < > 2.80*   < > 2.58*   CALCIUM mg/dL 7.2* 7.6* 7.8*   < > 7.9*   < > 8.8   ALK PHOS U/L 74  --   --   --  60  --  69   ALT U/L 11  --   --   --  16  --  11   AST U/L 20  --   --   --  64*  --  46*    < > = values in this interval not displayed.     Results from last 7 days   Lab Units 01/31/24  0341 01/30/24 2128 01/30/24  1332 01/29/24  1407 01/29/24  0748 01/28/24 2126   INR   --   --   --   --  1.43* 1.22*   PTT seconds 75* 53* 63*   < > 45* 36    < > = values in this interval not displayed.     Results from last 7 days   Lab Units 02/02/24  0503 02/01/24  0502 01/31/24  0508   MAGNESIUM mg/dL 2.1 2.2 2.2       Echo: personally reviewed -normal LV size and function with grade 1 diastolic dysfunction, hypokinetic basal inferoseptal and inferior walls.  Normal RV size and function.  Left atrial enlargement.  Moderate thickening of the mitral valve with mild regurgitation.  Mild tricuspid regurgitation.    EKG personally reviewed by Flaco Adams MD.

## 2024-02-03 NOTE — PROGRESS NOTES
Progress Note - Nephrology   Loida Duarte 78 y.o. female MRN: [id number]  Unit/Bed#: S -01 Encounter: [id number]    ASSESSMENT AND PLAN:  78-year-old female with a history of abdominal pain fever tachycardia leukocytosis found to have E. coli/Enterococcus bacteremia also non STEMI and positive stool status post EGD we are asked to see for acute kidney injury    1.  RAKEL/POA: Unknown baseline.  Etiology felt related to right hydronephrosis/prerenal/sepsis with possible ATN  - Presenting creatinine 2.58 with a peak of 2.89  - UA large leukocytes 10-20 RBCs innumerable WBCs positive E. coli  - CT scan demonstrated 6 mm right ureterovesicular junction calculus moderate right hydroureteronephrosis right perinephric right periureteral stranding simple cyst in the lower pole of the left kidney    Current creatinine: 1.3 continues to improve    Recommendations  - Status post ureteral stent/King catheter  - Treat septicemia per primary service and infectious disease  - Continue IV fluids  - Avoid nephrotoxic agents such as NSAIDs and minimize contrast    Patient potentially may go for cardiac catheterization potentially Monday so therefore we will continue with IV fluids for now    2.  Blood pressure slightly labile will monitor with hold parameters    3.  Electrolytes/acid-base acceptable    4.  MBD of RAKEL: All acceptable    5.  Anemia: Probable gastritis as per EGD per primary team and per GI  - Current hemoglobin 11.2  - Will check iron studies for completeness    6.  Major problems:  - Urosepsis with right ureteral calculus per infectious disease and primary service status post right ureteral stent  - Non-STEMI for cardiac catheterization please see above  - Morbid obesity      Subjective:   Feeling overall much better  No chest pain or shortness of breath  No current nausea vomiting diarrhea  King catheter in place    Objective:     Vitals: Blood pressure (!) 174/84, pulse 90, temperature 99.2 °F (37.3 °C),  "resp. rate (!) 24, height 4' 9.01\" (1.448 m), weight 112 kg (247 lb 5.7 oz), SpO2 94%.,Body mass index is 53.51 kg/m².    Weight (last 2 days)       Date/Time Weight    02/03/24 0600 112 (247.36)    02/02/24 0503 111 (244.71)    02/01/24 0216 110 (243.17)              Intake/Output Summary (Last 24 hours) at 2/3/2024 0947  Last data filed at 2/2/2024 1901  Gross per 24 hour   Intake --   Output 850 ml   Net -850 ml       Urethral Catheter Latex;Double-lumen 18 Fr. (Active)   Reasons to continue Urinary Catheter  Accurate I&O assessment in critically ill patients (48 hr. max) 02/02/24 0443   Goal for Removal Voiding trial when ambulation improves 02/02/24 0443   Site Assessment Clean;Skin intact 02/02/24 0443   King Care Done 02/02/24 0900   Collection Container Standard drainage bag 02/02/24 0443   Securement Method Securing device (Describe) 02/02/24 0443   Output (mL) 1000 mL 02/02/24 0443       Physical Exam: General:  No acute distress/morbidly obese  Skin:  No acute rash  Eyes:  No scleral icterus and noninjected  ENT:  Moist mucous membranes  Neck:  Supple, no jugular venous distention, trachea midline, overall appearance is normal  Chest:  Clear to auscultation  CVS:  Regular rate and rhythm, without a rub or gallops  Abdomen: Obese, normal bowel sounds, soft and nontender and nondistended  Extremities:  No edema, and no cyanosis, no significant arthritic changes  Neuro:  No gross focality  Psych:  Alert and oriented and appropriate                Medications:    Scheduled Meds:  Current Facility-Administered Medications   Medication Dose Route Frequency Provider Last Rate    acetaminophen  975 mg Oral Q8H PRN Luiz Calle MD      albuterol  2 puff Inhalation Q6H Frank Reynolds MD      amLODIPine  10 mg Oral Daily Luiz Calle MD      ampicillin  2,000 mg Intravenous Q6H Darren Carrasco MD 2,000 mg (02/03/24 0459)    aspirin  81 mg Oral Daily Luiz Calle MD      " atorvastatin  80 mg Oral QPM Luiz Calle MD      cefTRIAXone  2,000 mg Intravenous Q12H Luiz Calle MD 2,000 mg (02/02/24 2220)    chlorhexidine  15 mL Mouth/Throat Q12H [facility] Luiz Calle MD      clopidogrel  75 mg Oral Daily Luiz Calle MD      diphenhydrAMINE (BENADRYL) 25 mg in sodium chloride 0.9 % 50 mL IVPB  25 mg Intravenous Q6H PRN Luiz Calle MD      EPINEPHrine PF  0.3 mg Intramuscular Once PRN Luiz Calle MD      heparin (porcine)  5,000 Units Subcutaneous Q8H GUNJAN Luiz Calle MD      hydrALAZINE  10 mg Intravenous Q8H PRN Luiz Calle MD      melatonin  6 mg Oral HS Luiz Calle MD      multi-electrolyte  50 mL/hr Intravenous Continuous Yolanda Hunter MD 50 mL/hr (02/02/24 1431)    pantoprazole  40 mg Oral BID AC Luiz Calle MD      sodium chloride  2 spray Each Nare Daily Yessica Geiger MD      trimethobenzamide  200 mg Intramuscular Q6H PRN Luiz Calle MD         PRN Meds:.  acetaminophen    diphenhydrAMINE (BENADRYL) 25 mg in sodium chloride 0.9 % 50 mL IVPB    EPINEPHrine PF    hydrALAZINE    trimethobenzamide    Continuous Infusions:multi-electrolyte, 50 mL/hr, Last Rate: 50 mL/hr (02/02/24 1431)        Lab, Imaging and other studies: I have personally reviewed pertinent labs.  Laboratory Results:  Results from last 7 days   Lab Units 02/03/24  0523 02/02/24  0503 02/01/24  0502 01/31/24  0508 01/30/24  1802 01/30/24  0615 01/29/24  0543 01/29/24  0210   WBC Thousand/uL 13.71* 12.13* 15.03* 22.74*  --  30.35* 25.47* 30.32*   HEMOGLOBIN g/dL 11.2* 10.7* 11.8 12.3  --  13.5 12.8 12.6   HEMATOCRIT % 34.6* 32.8* 36.6 38.4  --  41.0 39.1 39.2   PLATELETS Thousands/uL 126* 99* 86* 92*  --  103* 121* 133*   POTASSIUM mmol/L 4.1 3.9 4.2 4.5 4.6 4.0 4.6 3.9   CHLORIDE mmol/L 104 105 105 103 101 101 100 100   CO2 mmol/L 25 22 21 22 20* 22 23 23   BUN mg/dL 31* 43* 52* 57* 57* 54* 40*  "37*   CREATININE mg/dL 1.38* 1.56* 1.84* 2.26* 2.49* 2.89* 2.80* 2.75*   CALCIUM mg/dL 7.2* 7.6* 7.8* 8.5 8.8 8.6 7.9* 7.9*   MAGNESIUM mg/dL  --  2.1 2.2 2.2  --  2.3 2.2 1.5*   PHOSPHORUS mg/dL  --  2.7 2.8 3.4  --  3.9 4.3* 3.5     Urinalysis:   Lab Results   Component Value Date    COLORU Yellow 01/28/2024    CLARITYU Extra Turbid 01/28/2024    SPECGRAV 1.022 01/28/2024    PHUR 5.5 01/28/2024    LEUKOCYTESUR Large (A) 01/28/2024    NITRITE Negative 01/28/2024    GLUCOSEU Negative 01/28/2024    KETONESU 10 (1+) (A) 01/28/2024    BILIRUBINUR Negative 01/28/2024    BLOODU Moderate (A) 01/28/2024     ABGs: No results found for: \"PH\"  Radiology review:     Portions of the record may have been created with voice recognition software.  Occasional wrong word or \"sound a like\" substitutions may have occurred due to the inherent limitations of voice recognition software.  Read the chart carefully and recognize, using context, where substitutions have occurred.                    "

## 2024-02-03 NOTE — ASSESSMENT & PLAN NOTE
Lab Results   Component Value Date    CREATININE 1.27 02/04/2024    CREATININE 1.38 (H) 02/03/2024    CREATININE 1.56 (H) 02/02/2024      Initial sCr - 2.58  Unknown baseline sCr   Likely worsened in setting of severe sepsis, obstructing renal calculus  Received 500cc bolus in ED, additional fluids initially held given respiratory status, elevated BNP and concern for volume overload.     Today creatinine continue to improve    Plan:  Maintain navas catheter  Strict I&O  Avoid nephrotoxins and hypotension  Monitor and replete electrolytes  Repeat BMP daily  Continue with gentle hydration

## 2024-02-04 PROBLEM — E87.8 ELECTROLYTE ABNORMALITY: Status: ACTIVE | Noted: 2024-02-04

## 2024-02-04 PROBLEM — D69.6 THROMBOCYTOPENIA (HCC): Status: RESOLVED | Noted: 2024-02-01 | Resolved: 2024-02-04

## 2024-02-04 LAB
ANION GAP SERPL CALCULATED.3IONS-SCNC: 9 MMOL/L
BACTERIA BLD CULT: NORMAL
BASOPHILS # BLD MANUAL: 0 THOUSAND/UL (ref 0–0.1)
BASOPHILS NFR MAR MANUAL: 0 % (ref 0–1)
BUN SERPL-MCNC: 25 MG/DL (ref 5–25)
CALCIUM SERPL-MCNC: 7.4 MG/DL (ref 8.4–10.2)
CHLORIDE SERPL-SCNC: 107 MMOL/L (ref 96–108)
CO2 SERPL-SCNC: 21 MMOL/L (ref 21–32)
CREAT SERPL-MCNC: 1.27 MG/DL (ref 0.6–1.3)
EOSINOPHIL # BLD MANUAL: 0.49 THOUSAND/UL (ref 0–0.4)
EOSINOPHIL NFR BLD MANUAL: 3 % (ref 0–6)
ERYTHROCYTE [DISTWIDTH] IN BLOOD BY AUTOMATED COUNT: 15.1 % (ref 11.6–15.1)
FERRITIN SERPL-MCNC: 193 NG/ML (ref 11–307)
GFR SERPL CREATININE-BSD FRML MDRD: 39 ML/MIN/1.73SQ M
GLUCOSE SERPL-MCNC: 100 MG/DL (ref 65–140)
HCT VFR BLD AUTO: 35.5 % (ref 34.8–46.1)
HGB BLD-MCNC: 11.3 G/DL (ref 11.5–15.4)
IRON SERPL-MCNC: <10 UG/DL (ref 50–212)
LYMPHOCYTES # BLD AUTO: 16 % (ref 14–44)
LYMPHOCYTES # BLD AUTO: 2.76 THOUSAND/UL (ref 0.6–4.47)
MAGNESIUM SERPL-MCNC: 1.9 MG/DL (ref 1.9–2.7)
MCH RBC QN AUTO: 32.1 PG (ref 26.8–34.3)
MCHC RBC AUTO-ENTMCNC: 31.8 G/DL (ref 31.4–37.4)
MCV RBC AUTO: 101 FL (ref 82–98)
METAMYELOCYTES NFR BLD MANUAL: 2 % (ref 0–1)
MONOCYTES # BLD AUTO: 1.79 THOUSAND/UL (ref 0–1.22)
MONOCYTES NFR BLD: 11 % (ref 4–12)
MYELOCYTES NFR BLD MANUAL: 1 % (ref 0–1)
NEUTROPHILS # BLD MANUAL: 10.72 THOUSAND/UL (ref 1.85–7.62)
NEUTS BAND NFR BLD MANUAL: 3 % (ref 0–8)
NEUTS SEG NFR BLD AUTO: 63 % (ref 43–75)
PLATELET # BLD AUTO: 182 THOUSANDS/UL (ref 149–390)
PLATELET BLD QL SMEAR: ADEQUATE
PMV BLD AUTO: 12.1 FL (ref 8.9–12.7)
POTASSIUM SERPL-SCNC: 3.7 MMOL/L (ref 3.5–5.3)
RBC # BLD AUTO: 3.52 MILLION/UL (ref 3.81–5.12)
RBC MORPH BLD: NORMAL
SODIUM SERPL-SCNC: 137 MMOL/L (ref 135–147)
TIBC SERPL-MCNC: <168 UG/DL (ref 250–450)
UIBC SERPL-MCNC: 158 UG/DL (ref 155–355)
VARIANT LYMPHS # BLD AUTO: 1 %
WBC # BLD AUTO: 16.24 THOUSAND/UL (ref 4.31–10.16)

## 2024-02-04 PROCEDURE — 85007 BL SMEAR W/DIFF WBC COUNT: CPT | Performed by: INTERNAL MEDICINE

## 2024-02-04 PROCEDURE — 99232 SBSQ HOSP IP/OBS MODERATE 35: CPT | Performed by: INTERNAL MEDICINE

## 2024-02-04 PROCEDURE — 85027 COMPLETE CBC AUTOMATED: CPT | Performed by: INTERNAL MEDICINE

## 2024-02-04 PROCEDURE — 80048 BASIC METABOLIC PNL TOTAL CA: CPT | Performed by: INTERNAL MEDICINE

## 2024-02-04 PROCEDURE — 82728 ASSAY OF FERRITIN: CPT | Performed by: INTERNAL MEDICINE

## 2024-02-04 PROCEDURE — 83735 ASSAY OF MAGNESIUM: CPT | Performed by: INTERNAL MEDICINE

## 2024-02-04 PROCEDURE — 83540 ASSAY OF IRON: CPT | Performed by: INTERNAL MEDICINE

## 2024-02-04 PROCEDURE — 83550 IRON BINDING TEST: CPT | Performed by: INTERNAL MEDICINE

## 2024-02-04 RX ORDER — POTASSIUM CHLORIDE 20 MEQ/1
40 TABLET, EXTENDED RELEASE ORAL ONCE
Status: COMPLETED | OUTPATIENT
Start: 2024-02-04 | End: 2024-02-04

## 2024-02-04 RX ORDER — SODIUM CHLORIDE 9 MG/ML
50 INJECTION, SOLUTION INTRAVENOUS CONTINUOUS
Status: DISCONTINUED | OUTPATIENT
Start: 2024-02-04 | End: 2024-02-06

## 2024-02-04 RX ORDER — LEVALBUTEROL INHALATION SOLUTION 1.25 MG/3ML
1.25 SOLUTION RESPIRATORY (INHALATION) 3 TIMES DAILY PRN
Status: DISCONTINUED | OUTPATIENT
Start: 2024-02-04 | End: 2024-02-08 | Stop reason: HOSPADM

## 2024-02-04 RX ORDER — MAGNESIUM SULFATE HEPTAHYDRATE 40 MG/ML
2 INJECTION, SOLUTION INTRAVENOUS ONCE
Status: COMPLETED | OUTPATIENT
Start: 2024-02-04 | End: 2024-02-04

## 2024-02-04 RX ORDER — LANOLIN ALCOHOL/MO/W.PET/CERES
1 CREAM (GRAM) TOPICAL
Status: DISCONTINUED | OUTPATIENT
Start: 2024-02-05 | End: 2024-02-08 | Stop reason: HOSPADM

## 2024-02-04 RX ADMIN — CHLORHEXIDINE GLUCONATE 15 ML: 1.2 SOLUTION ORAL at 21:31

## 2024-02-04 RX ADMIN — CEFTRIAXONE 2000 MG: 2 INJECTION, POWDER, FOR SOLUTION INTRAMUSCULAR; INTRAVENOUS at 21:30

## 2024-02-04 RX ADMIN — ACETAMINOPHEN 975 MG: 325 TABLET, FILM COATED ORAL at 13:04

## 2024-02-04 RX ADMIN — SODIUM CHLORIDE 50 ML/HR: 0.9 INJECTION, SOLUTION INTRAVENOUS at 12:59

## 2024-02-04 RX ADMIN — HEPARIN SODIUM 5000 UNITS: 5000 INJECTION INTRAVENOUS; SUBCUTANEOUS at 21:31

## 2024-02-04 RX ADMIN — ATORVASTATIN CALCIUM 80 MG: 40 TABLET, FILM COATED ORAL at 17:01

## 2024-02-04 RX ADMIN — ALBUTEROL SULFATE 2 PUFF: 90 AEROSOL, METERED RESPIRATORY (INHALATION) at 04:24

## 2024-02-04 RX ADMIN — ALBUTEROL SULFATE 2 PUFF: 90 AEROSOL, METERED RESPIRATORY (INHALATION) at 09:11

## 2024-02-04 RX ADMIN — MAGNESIUM SULFATE HEPTAHYDRATE 2 G: 40 INJECTION, SOLUTION INTRAVENOUS at 07:13

## 2024-02-04 RX ADMIN — CLOPIDOGREL BISULFATE 75 MG: 75 TABLET ORAL at 08:00

## 2024-02-04 RX ADMIN — AMLODIPINE BESYLATE 10 MG: 10 TABLET ORAL at 08:00

## 2024-02-04 RX ADMIN — AMPICILLIN SODIUM 2000 MG: 2 INJECTION, POWDER, FOR SOLUTION INTRAVENOUS at 04:24

## 2024-02-04 RX ADMIN — HEPARIN SODIUM 5000 UNITS: 5000 INJECTION INTRAVENOUS; SUBCUTANEOUS at 05:19

## 2024-02-04 RX ADMIN — ASPIRIN 81 MG: 81 TABLET, COATED ORAL at 08:00

## 2024-02-04 RX ADMIN — Medication 6 MG: at 21:31

## 2024-02-04 RX ADMIN — AMPICILLIN SODIUM 2000 MG: 2 INJECTION, POWDER, FOR SOLUTION INTRAVENOUS at 22:52

## 2024-02-04 RX ADMIN — ALBUTEROL SULFATE 2 PUFF: 90 AEROSOL, METERED RESPIRATORY (INHALATION) at 16:56

## 2024-02-04 RX ADMIN — ALBUTEROL SULFATE 2 PUFF: 90 AEROSOL, METERED RESPIRATORY (INHALATION) at 21:41

## 2024-02-04 RX ADMIN — POTASSIUM CHLORIDE 40 MEQ: 1500 TABLET, EXTENDED RELEASE ORAL at 07:12

## 2024-02-04 RX ADMIN — PANTOPRAZOLE SODIUM 40 MG: 40 TABLET, DELAYED RELEASE ORAL at 16:51

## 2024-02-04 RX ADMIN — HYDRALAZINE HYDROCHLORIDE 10 MG: 20 INJECTION, SOLUTION INTRAMUSCULAR; INTRAVENOUS at 08:07

## 2024-02-04 RX ADMIN — HEPARIN SODIUM 5000 UNITS: 5000 INJECTION INTRAVENOUS; SUBCUTANEOUS at 13:04

## 2024-02-04 RX ADMIN — AMPICILLIN SODIUM 2000 MG: 2 INJECTION, POWDER, FOR SOLUTION INTRAVENOUS at 09:58

## 2024-02-04 RX ADMIN — SALINE NASAL SPRAY 2 SPRAY: 1.5 SOLUTION NASAL at 08:00

## 2024-02-04 RX ADMIN — AMPICILLIN SODIUM 2000 MG: 2 INJECTION, POWDER, FOR SOLUTION INTRAVENOUS at 16:54

## 2024-02-04 RX ADMIN — CEFTRIAXONE 2000 MG: 2 INJECTION, POWDER, FOR SOLUTION INTRAMUSCULAR; INTRAVENOUS at 09:09

## 2024-02-04 RX ADMIN — PANTOPRAZOLE SODIUM 40 MG: 40 TABLET, DELAYED RELEASE ORAL at 07:12

## 2024-02-04 NOTE — PROGRESS NOTES
Progress Note - Nephrology   Loida Duarte 78 y.o. female MRN: [id number]  Unit/Bed#: S -01 Encounter: [id number]    ASSESSMENT AND PLAN:  78-year-old female with a history of abdominal pain fever tachycardia leukocytosis found to have E. coli/Enterococcus bacteremia also non STEMI and positive stool status post EGD we are asked to see for acute kidney injury     1.  RAKEL/POA: Unknown baseline.  Etiology felt related to right hydronephrosis/prerenal/sepsis with possible ATN  - Presenting creatinine 2.58 with a peak of 2.89  - UA large leukocytes 10-20 RBCs innumerable WBCs positive E. coli  - CT scan demonstrated 6 mm right ureterovesicular junction calculus moderate right hydroureteronephrosis right perinephric right periureteral stranding simple cyst in the lower pole of the left kidney     Current creatinine: 1.27 improved!    Will Hep-Lock IV fluids at this time please see below     Recommendations  - Status post ureteral stent/King catheter  - Treat septicemia per primary service and infectious disease  - Will Hep-Lock IV fluids  - Avoid nephrotoxic agents such as NSAIDs and minimize contrast     Patient potentially may go for cardiac catheterization potentially Monday so therefore we will continue with IV fluids for now     2.  Blood pressure somewhat elevated amlodipine just increased.  Potentially could add ARB/ACE inhibitor in the next few days once renal function remained stable and then decrease amlodipine to 5 mg for synergy and not overtreating     3.  Electrolytes/acid-base potassium 3.71 dose of Kdur     4.  MBD of RAKEL: Magnesium 1.9, will give 1 g     5.  Anemia: Probable gastritis as per EGD per primary team and per GI  - Current hemoglobin 11.3  - Will check iron studies for completeness: Pending     6.  Major problems:  - Urosepsis with right ureteral calculus per infectious disease and primary service status post right ureteral stent  - Non-STEMI for cardiac catheterization please see  "above  - Morbid obesity     Patient will require cardiac catheterization.  I will give slow IV fluids for risk reduction.      Subjective:   Patient is feeling somewhat better  Slight loose stools but no nausea vomiting  No significant shortness of breath or chest pain    Objective:     Vitals: Blood pressure 170/78, pulse 96, temperature 98.5 °F (36.9 °C), resp. rate 16, height 4' 9.01\" (1.448 m), weight 113 kg (249 lb 1.9 oz), SpO2 92%.,Body mass index is 53.89 kg/m².    Weight (last 2 days)       Date/Time Weight    02/04/24 0600 113 (249.12)    02/04/24 0300 113 (249.34)    02/03/24 0600 112 (247.36)    02/02/24 0503 111 (244.71)              Intake/Output Summary (Last 24 hours) at 2/4/2024 1231  Last data filed at 2/4/2024 0843  Gross per 24 hour   Intake 120 ml   Output 1725 ml   Net -1605 ml       Urethral Catheter Latex;Double-lumen 18 Fr. (Active)   Reasons to continue Urinary Catheter  Accurate I&O assessment in critically ill patients (48 hr. max) 02/02/24 0443   Goal for Removal Voiding trial when ambulation improves 02/02/24 0443   Site Assessment Clean;Skin intact 02/02/24 0443   King Care Done 02/04/24 0900   Collection Container Standard drainage bag 02/02/24 0443   Securement Method Securing device (Describe) 02/02/24 0443   Output (mL) 725 mL 02/04/24 0758       Physical Exam: General:  No acute distress/morbidly obese  Skin:  No acute rash  Eyes:  No scleral icterus and noninjected  ENT:  Moist mucous membranes  Neck:  Supple, no jugular venous distention, trachea midline, overall appearance is normal  Chest: Very minimal inspiratory/expiratory wheezing  CVS:  Regular rate and rhythm, without a rub or gallops  Abdomen: Obese, normal bowel sounds, soft and nontender and nondistended  Extremities:  No edema, and no cyanosis, no significant arthritic changes  Neuro:  No gross focality  Psych:  Alert and oriented and appropriate                Medications:    Scheduled Meds:  Current " Facility-Administered Medications   Medication Dose Route Frequency Provider Last Rate    acetaminophen  975 mg Oral Q8H PRN Luiz Calle MD      albuterol  2 puff Inhalation Q6H Frank Reynolds MD      amLODIPine  10 mg Oral Daily Jefe Tenorio MD      ampicillin  2,000 mg Intravenous Q6H Darren Carrasco MD 2,000 mg (02/04/24 0958)    aspirin  81 mg Oral Daily Luiz Calle MD      atorvastatin  80 mg Oral QPM Luiz Calle MD      [START ON 2/5/2024] calcium carbonate-vitamin D  1 tablet Oral Daily With Breakfast John Omer MD      cefTRIAXone  2,000 mg Intravenous Q12H Luiz Calle MD 2,000 mg (02/04/24 0909)    chlorhexidine  15 mL Mouth/Throat Q12H GUNJAN Luiz Calle MD      clopidogrel  75 mg Oral Daily Luiz Calle MD      diphenhydrAMINE (BENADRYL) 25 mg in sodium chloride 0.9 % 50 mL IVPB  25 mg Intravenous Q6H PRN Luiz Calle MD      EPINEPHrine PF  0.3 mg Intramuscular Once PRN Luiz Calle MD      heparin (porcine)  5,000 Units Subcutaneous Q8H GUNJAN Luiz Calle MD      hydrALAZINE  10 mg Intravenous Q8H PRN Luiz Calle MD      ipratropium  0.5 mg Nebulization TID PRN John Omer MD      levalbuterol  1.25 mg Nebulization TID PRN John Omer MD      melatonin  6 mg Oral HS Luiz Calle MD      multi-electrolyte  50 mL/hr Intravenous Continuous Yolanda Hunter MD 50 mL/hr (02/03/24 1211)    pantoprazole  40 mg Oral BID AC Luiz Calle MD      sodium chloride  2 spray Each Nare Daily Yessica Geiger MD      trimethobenzamide  200 mg Intramuscular Q6H PRN Luiz Calle MD         PRN Meds:.  acetaminophen    diphenhydrAMINE (BENADRYL) 25 mg in sodium chloride 0.9 % 50 mL IVPB    EPINEPHrine PF    hydrALAZINE    ipratropium    levalbuterol    trimethobenzamide    Continuous Infusions:multi-electrolyte, 50 mL/hr, Last Rate: 50 mL/hr (02/03/24  "1211)        Lab, Imaging and other studies: I have personally reviewed pertinent labs.  Laboratory Results:  Results from last 7 days   Lab Units 02/04/24  0458 02/03/24  0523 02/02/24  0503 02/01/24  0502 01/31/24  0508 01/30/24  1802 01/30/24  0615 01/29/24  0543 01/29/24  0210   WBC Thousand/uL 16.24* 13.71* 12.13* 15.03* 22.74*  --  30.35* 25.47* 30.32*   HEMOGLOBIN g/dL 11.3* 11.2* 10.7* 11.8 12.3  --  13.5 12.8 12.6   HEMATOCRIT % 35.5 34.6* 32.8* 36.6 38.4  --  41.0 39.1 39.2   PLATELETS Thousands/uL 182 126* 99* 86* 92*  --  103* 121* 133*   POTASSIUM mmol/L 3.7 4.1 3.9 4.2 4.5 4.6 4.0 4.6 3.9   CHLORIDE mmol/L 107 104 105 105 103 101 101 100 100   CO2 mmol/L 21 25 22 21 22 20* 22 23 23   BUN mg/dL 25 31* 43* 52* 57* 57* 54* 40* 37*   CREATININE mg/dL 1.27 1.38* 1.56* 1.84* 2.26* 2.49* 2.89* 2.80* 2.75*   CALCIUM mg/dL 7.4* 7.2* 7.6* 7.8* 8.5 8.8 8.6 7.9* 7.9*   MAGNESIUM mg/dL 1.9  --  2.1 2.2 2.2  --  2.3 2.2 1.5*   PHOSPHORUS mg/dL  --   --  2.7 2.8 3.4  --  3.9 4.3* 3.5     Urinalysis:   Lab Results   Component Value Date    COLORU Yellow 01/28/2024    CLARITYU Extra Turbid 01/28/2024    SPECGRAV 1.022 01/28/2024    PHUR 5.5 01/28/2024    LEUKOCYTESUR Large (A) 01/28/2024    NITRITE Negative 01/28/2024    GLUCOSEU Negative 01/28/2024    KETONESU 10 (1+) (A) 01/28/2024    BILIRUBINUR Negative 01/28/2024    BLOODU Moderate (A) 01/28/2024     ABGs: No results found for: \"PH\"  Radiology review:     Portions of the record may have been created with voice recognition software.  Occasional wrong word or \"sound a like\" substitutions may have occurred due to the inherent limitations of voice recognition software.  Read the chart carefully and recognize, using context, where substitutions have occurred.                    "

## 2024-02-04 NOTE — ASSESSMENT & PLAN NOTE
Lab Results   Component Value Date    CALCIUM 7.4 (L) 02/04/2024    CALCIUM 7.2 (L) 02/03/2024   Calcium low at this point, previously corrected to normal levels.  Giving vitamin D and calcium supplementation  Continue to monitor

## 2024-02-04 NOTE — PROGRESS NOTES
Cardiology Progress Note - Loida Duarte 78 y.o. female MRN: [id number]    Unit/Bed#: S -01 Encounter: [id number]      Assessment/Recommendations:  1.  Elevated troponin: Likely type II MI in the setting of sepsis and hypoxic respiratory failure with new wall motion abnormality seen on echocardiogram.  Status post IV heparin for 48 hours.  Aspirin and Plavix had been held due to possible GI bleed, now resumed.  Continue on statin.  Patient will require cardiac catheterization, once recovered from sepsis and renal function improves -likely tomorrow as renal function is now acceptable.  Echocardiogram revealed normal LV function but with hypokinesis of basal IS and inferior walls.  Reports no chest pain overnight.  2.  Acute hypoxic respiratory failure: Agree with holding off on diuretics due to active sepsis and renal failure.  Currently appears to be euvolemic, status post IVF.  Renal function also seems to be improving.  Continues on 2-3 L nasal cannula oxygen support, continues to improve.  Still no diuretic current meds currently.  3.  Urosepsis with right ureteral calculus: Continued on IV antibiotics.  4.  Acute kidney injury: Likely secondary to sepsis and obstructing renal calculus.  Improving levels with IVF, now off IV fluids.  Will plan for cardiac catheterization tomorrow with improved creatinine.  5.  Dyslipidemia: Continued on statin.  6.  Morbid obesity: With BMI of 52  7.  Bacteremia: Anesthesia deemed the patient too high risk to proceed with LUC on Thursday.  We will attempt to complete tomorrow.  There was some increased mitral valve leaflet thickening seen on transthoracic echocardiogram.  8.  Fecal occult positive stool: Likely from gastritis as per EGD.    Subjective:   Patient seen and examined.  No significant events overnight.  ; pertinent negatives - chest pain, chest pressure/discomfort, dyspnea, irregular heart beat, lower extremity edema, and palpitations.    Objective:     Vitals:  "Blood pressure (!) 190/82, pulse 96, temperature 98.5 °F (36.9 °C), resp. rate 16, height 4' 9.01\" (1.448 m), weight 113 kg (249 lb 1.9 oz), SpO2 92%., Body mass index is 53.89 kg/m².,   Orthostatic Blood Pressures      Flowsheet Row Most Recent Value   Blood Pressure 190/82 filed at 02/04/2024 0805   Patient Position - Orthostatic VS Lying filed at 02/03/2024 1520              Intake/Output Summary (Last 24 hours) at 2/4/2024 0913  Last data filed at 2/4/2024 0843  Gross per 24 hour   Intake 120 ml   Output 1725 ml   Net -1605 ml       TELE: No significant arrhythmias seen.    Physical Exam:    GEN: Loida Duarte appears well, alert and oriented x 3, pleasant and cooperative   HEENT: pupils equal, round, and reactive to light; extraocular muscles intact  NECK: supple, no carotid bruits   HEART: regular rhythm, normal S1 and S2, +systolic murmur, no clicks, gallops or rubs   LUNGS: clear to auscultation bilaterally; no wheezes, rales, or rhonchi   ABDOMEN: normal bowel sounds, soft, no tenderness, no distention  EXTREMITIES: peripheral pulses normal; no clubbing, cyanosis, or edema  NEURO: no focal findings   SKIN: normal without suspicious lesions on exposed skin      Medications:      Current Facility-Administered Medications:     acetaminophen (TYLENOL) tablet 975 mg, 975 mg, Oral, Q8H PRN, Luiz Calle MD, 975 mg at 02/03/24 1549    albuterol (PROVENTIL HFA,VENTOLIN HFA) inhaler 2 puff, 2 puff, Inhalation, Q6H, Frank Reynolds MD, 2 puff at 02/04/24 0911    amLODIPine (NORVASC) tablet 10 mg, 10 mg, Oral, Daily, Jefe Tenorio MD, 10 mg at 02/04/24 0800    ampicillin (OMNIPEN) 2,000 mg in sodium chloride 0.9 % 100 mL IVPB, 2,000 mg, Intravenous, Q6H, Darren Carrasco MD, Last Rate: 200 mL/hr at 02/04/24 0424, 2,000 mg at 02/04/24 0424    aspirin (ECOTRIN LOW STRENGTH) EC tablet 81 mg, 81 mg, Oral, Daily, Luiz Calle MD, 81 mg at 02/04/24 0800    atorvastatin (LIPITOR) tablet 80 mg, 80 " mg, Oral, QPM, Luiz Calle MD, 80 mg at 02/03/24 1640    cefTRIAXone (ROCEPHIN) 2,000 mg in dextrose 5 % 50 mL IVPB, 2,000 mg, Intravenous, Q12H, Luiz Calle MD, Last Rate: 100 mL/hr at 02/04/24 0909, 2,000 mg at 02/04/24 0909    chlorhexidine (PERIDEX) 0.12 % oral rinse 15 mL, 15 mL, Mouth/Throat, Q12H GUNJAN, Luiz Calle MD, 15 mL at 02/03/24 2114    clopidogrel (PLAVIX) tablet 75 mg, 75 mg, Oral, Daily, Luiz Calle MD, 75 mg at 02/04/24 0800    diphenhydrAMINE (BENADRYL) 25 mg in sodium chloride 0.9 % 50 mL IVPB, 25 mg, Intravenous, Q6H PRN, Luiz Calle MD    EPINEPHrine PF (ADRENALIN) 1 mg/mL injection 0.3 mg, 0.3 mg, Intramuscular, Once PRN, Luiz Calle MD    heparin (porcine) subcutaneous injection 5,000 Units, 5,000 Units, Subcutaneous, Q8H GUNJAN, Luiz Calle MD, 5,000 Units at 02/04/24 0519    hydrALAZINE (APRESOLINE) injection 10 mg, 10 mg, Intravenous, Q8H PRN, Luiz Calle MD, 10 mg at 02/04/24 0807    melatonin tablet 6 mg, 6 mg, Oral, HS, Luiz Calle MD, 6 mg at 02/03/24 2113    multi-electrolyte (PLASMALYTE-A/ISOLYTE-S PH 7.4) IV solution, 50 mL/hr, Intravenous, Continuous, Yolanda Hunter MD, Last Rate: 50 mL/hr at 02/03/24 1211, 50 mL/hr at 02/03/24 1211    pantoprazole (PROTONIX) EC tablet 40 mg, 40 mg, Oral, BID AC, Luiz Calle MD, 40 mg at 02/04/24 0712    sodium chloride (OCEAN) 0.65 % nasal spray 2 spray, 2 spray, Each Nare, Daily, Yessica Geiger MD, 2 spray at 02/04/24 0800    trimethobenzamide (TIGAN) IM injection 200 mg, 200 mg, Intramuscular, Q6H PRN, Luiz Calle MD, 200 mg at 01/30/24 1958     Labs & Results:        Results from last 7 days   Lab Units 02/04/24  0458 02/03/24  0523 02/02/24  0503   WBC Thousand/uL 16.24* 13.71* 12.13*   HEMOGLOBIN g/dL 11.3* 11.2* 10.7*   HEMATOCRIT % 35.5 34.6* 32.8*   PLATELETS Thousands/uL 182 126* 99*     Results from last 7 days    Lab Units 01/29/24  0543   TRIGLYCERIDES mg/dL 223*   HDL mg/dL 37*     Results from last 7 days   Lab Units 02/04/24  0458 02/03/24  0523 02/02/24  0503 01/30/24  0615 01/29/24  0543 01/29/24  0210 01/28/24 2126   POTASSIUM mmol/L 3.7 4.1 3.9   < > 4.6   < > 3.7   CHLORIDE mmol/L 107 104 105   < > 100   < > 99   CO2 mmol/L 21 25 22   < > 23   < > 23   BUN mg/dL 25 31* 43*   < > 40*   < > 34*   CREATININE mg/dL 1.27 1.38* 1.56*   < > 2.80*   < > 2.58*   CALCIUM mg/dL 7.4* 7.2* 7.6*   < > 7.9*   < > 8.8   ALK PHOS U/L  --  74  --   --  60  --  69   ALT U/L  --  11  --   --  16  --  11   AST U/L  --  20  --   --  64*  --  46*    < > = values in this interval not displayed.     Results from last 7 days   Lab Units 01/31/24  0341 01/30/24  2128 01/30/24  1332 01/29/24  1407 01/29/24  0748 01/28/24 2126   INR   --   --   --   --  1.43* 1.22*   PTT seconds 75* 53* 63*   < > 45* 36    < > = values in this interval not displayed.     Results from last 7 days   Lab Units 02/04/24  0458 02/02/24  0503 02/01/24  0502   MAGNESIUM mg/dL 1.9 2.1 2.2       Echo: personally reviewed -normal LV size and function with grade 1 diastolic dysfunction, hypokinetic basal inferoseptal and inferior walls.  Normal RV size and function.  Left atrial enlargement.  Moderate thickening of the mitral valve with mild regurgitation.  Mild tricuspid regurgitation.    EKG personally reviewed by Flaco Adams MD.

## 2024-02-04 NOTE — PLAN OF CARE
Problem: Potential for Falls  Goal: Patient will remain free of falls  Description: INTERVENTIONS:  - Educate patient/family on patient safety including physical limitations  - Instruct patient to call for assistance with activity   - Consult OT/PT to assist with strengthening/mobility   - Keep Call bell within reach  - Keep bed low and locked with side rails adjusted as appropriate  - Keep care items and personal belongings within reach  - Initiate and maintain comfort rounds  - Make Fall Risk Sign visible to staff  - Offer Toileting every 2 Hours, in advance of need  - Initiate/Maintain bed alarm  - Obtain necessary fall risk management equipment: alarms  - Apply yellow socks and bracelet for high fall risk patients  - Consider moving patient to room near nurses station  Outcome: Progressing     Problem: Prexisting or High Potential for Compromised Skin Integrity  Goal: Skin integrity is maintained or improved  Description: INTERVENTIONS:  - Identify patients at risk for skin breakdown  - Assess and monitor skin integrity  - Assess and monitor nutrition and hydration status  - Monitor labs   - Assess for incontinence   - Turn and reposition patient  - Assist with mobility/ambulation  - Relieve pressure over bony prominences  - Avoid friction and shearing  - Provide appropriate hygiene as needed including keeping skin clean and dry  - Evaluate need for skin moisturizer/barrier cream  - Collaborate with interdisciplinary team   - Patient/family teaching  - Consider wound care consult   Outcome: Progressing     Problem: Nutrition/Hydration-ADULT  Goal: Nutrient/Hydration intake appropriate for improving, restoring or maintaining nutritional needs  Description: Monitor and assess patient's nutrition/hydration status for malnutrition. Collaborate with interdisciplinary team and initiate plan and interventions as ordered.  Monitor patient's weight and dietary intake as ordered or per policy. Utilize nutrition  screening tool and intervene as necessary. Determine patient's food preferences and provide high-protein, high-caloric foods as appropriate.     INTERVENTIONS:  - Monitor oral intake, urinary output, labs, and treatment plans  - Assess nutrition and hydration status and recommend course of action  - Evaluate amount of meals eaten  - Assist patient with eating if necessary   - Allow adequate time for meals  - Recommend/ encourage appropriate diets, oral nutritional supplements, and vitamin/mineral supplements  - Order, calculate, and assess calorie counts as needed  - Recommend, monitor, and adjust tube feedings and TPN/PPN based on assessed needs  - Assess need for intravenous fluids  - Provide specific nutrition/hydration education as appropriate  - Include patient/family/caregiver in decisions related to nutrition  Outcome: Progressing     Problem: PAIN - ADULT  Goal: Verbalizes/displays adequate comfort level or baseline comfort level  Description: Interventions:  - Encourage patient to monitor pain and request assistance  - Assess pain using appropriate pain scale  - Administer analgesics based on type and severity of pain and evaluate response  - Implement non-pharmacological measures as appropriate and evaluate response  - Consider cultural and social influences on pain and pain management  - Notify physician/advanced practitioner if interventions unsuccessful or patient reports new pain  Outcome: Progressing     Problem: INFECTION - ADULT  Goal: Absence or prevention of progression during hospitalization  Description: INTERVENTIONS:  - Assess and monitor for signs and symptoms of infection  - Monitor lab/diagnostic results  - Monitor all insertion sites, i.e. indwelling lines, tubes, and drains  - Monitor endotracheal if appropriate and nasal secretions for changes in amount and color  - [place] appropriate cooling/warming therapies per order  - Administer medications as ordered  - Instruct and encourage  patient and family to use good hand hygiene technique  - Identify and instruct in appropriate isolation precautions for identified infection/condition  Outcome: Progressing  Goal: Absence of fever/infection during neutropenic period  Description: INTERVENTIONS:  - Monitor WBC    Outcome: Progressing     Problem: SAFETY ADULT  Goal: Patient will remain free of falls  Description: INTERVENTIONS:  - Educate patient/family on patient safety including physical limitations  - Instruct patient to call for assistance with activity   - Consult OT/PT to assist with strengthening/mobility   - Keep Call bell within reach  - Keep bed low and locked with side rails adjusted as appropriate  - Keep care items and personal belongings within reach  - Initiate and maintain comfort rounds  - Make Fall Risk Sign visible to staff  - Offer Toileting every 2 Hours, in advance of need  - Initiate/Maintain bed alarm  - Obtain necessary fall risk management equipment: alarms  - Apply yellow socks and bracelet for high fall risk patients  - Consider moving patient to room near nurses station  Outcome: Progressing  Goal: Maintain or return to baseline ADL function  Description: INTERVENTIONS:  -  Assess patient's ability to carry out ADLs; assess patient's baseline for ADL function and identify physical deficits which impact ability to perform ADLs (bathing, care of mouth/teeth, toileting, grooming, dressing, etc.)  - Assess/evaluate cause of self-care deficits   - Assess range of motion  - Assess patient's mobility; develop plan if impaired  - Assess patient's need for assistive devices and provide as appropriate  - Encourage maximum independence but intervene and supervise when necessary  - Involve family in performance of ADLs  - Assess for home care needs following discharge   - Consider OT consult to assist with ADL evaluation and planning for discharge  - Provide patient education as appropriate  Outcome: Progressing  Goal:  Maintains/Returns to pre admission functional level  Description: INTERVENTIONS:  - Perform AM-PAC 6 Click Basic Mobility/ Daily Activity assessment daily.  - Set and communicate daily mobility goal to care team and patient/family/caregiver.   - Collaborate with rehabilitation services on mobility goals if consulted  - Perform Range of Motion 3 times a day.  - Reposition patient every 2 hours.  - Dangle patient 3 times a day  - Stand patient 3 times a day  - Ambulate patient 3 times a day  - Out of bed to chair 3 times a day   - Out of bed for meals 3 times a day  - Out of bed for toileting  - Record patient progress and toleration of activity level   Outcome: Progressing     Problem: DISCHARGE PLANNING  Goal: Discharge to home or other facility with appropriate resources  Description: INTERVENTIONS:  - Identify barriers to discharge w/patient and caregiver  - Arrange for needed discharge resources and transportation as appropriate  - Identify discharge learning needs (meds, wound care, etc.)  - Arrange for interpretive services to assist at discharge as needed  - Refer to Case Management Department for coordinating discharge planning if the patient needs post-hospital services based on physician/advanced practitioner order or complex needs related to functional status, cognitive ability, or social support system  Outcome: Progressing     Problem: Knowledge Deficit  Goal: Patient/family/caregiver demonstrates understanding of disease process, treatment plan, medications, and discharge instructions  Description: Complete learning assessment and assess knowledge base.  Interventions:  - Provide teaching at level of understanding  - Provide teaching via preferred learning methods  Outcome: Progressing

## 2024-02-04 NOTE — PLAN OF CARE
Problem: Potential for Falls  Goal: Patient will remain free of falls  Description: INTERVENTIONS:  - Educate patient/family on patient safety including physical limitations  - Instruct patient to call for assistance with activity   - Consult OT/PT to assist with strengthening/mobility   - Keep Call bell within reach  - Keep bed low and locked with side rails adjusted as appropriate  - Keep care items and personal belongings within reach  - Initiate and maintain comfort rounds  - Make Fall Risk Sign visible to staff  - Offer Toileting every  Hours, in advance of need  - Initiate/Maintain alarm  - Obtain necessary fall risk management equipment:   - Apply yellow socks and bracelet for high fall risk patients  - Consider moving patient to room near nurses station  Outcome: Progressing     Problem: Prexisting or High Potential for Compromised Skin Integrity  Goal: Skin integrity is maintained or improved  Description: INTERVENTIONS:  - Identify patients at risk for skin breakdown  - Assess and monitor skin integrity  - Assess and monitor nutrition and hydration status  - Monitor labs   - Assess for incontinence   - Turn and reposition patient  - Assist with mobility/ambulation  - Relieve pressure over bony prominences  - Avoid friction and shearing  - Provide appropriate hygiene as needed including keeping skin clean and dry  - Evaluate need for skin moisturizer/barrier cream  - Collaborate with interdisciplinary team   - Patient/family teaching  - Consider wound care consult   Outcome: Progressing     Problem: Nutrition/Hydration-ADULT  Goal: Nutrient/Hydration intake appropriate for improving, restoring or maintaining nutritional needs  Description: Monitor and assess patient's nutrition/hydration status for malnutrition. Collaborate with interdisciplinary team and initiate plan and interventions as ordered.  Monitor patient's weight and dietary intake as ordered or per policy. Utilize nutrition screening tool and  intervene as necessary. Determine patient's food preferences and provide high-protein, high-caloric foods as appropriate.     INTERVENTIONS:  - Monitor oral intake, urinary output, labs, and treatment plans  - Assess nutrition and hydration status and recommend course of action  - Evaluate amount of meals eaten  - Assist patient with eating if necessary   - Allow adequate time for meals  - Recommend/ encourage appropriate diets, oral nutritional supplements, and vitamin/mineral supplements  - Order, calculate, and assess calorie counts as needed  - Recommend, monitor, and adjust tube feedings and TPN/PPN based on assessed needs  - Assess need for intravenous fluids  - Provide specific nutrition/hydration education as appropriate  - Include patient/family/caregiver in decisions related to nutrition  Outcome: Progressing     Problem: PAIN - ADULT  Goal: Verbalizes/displays adequate comfort level or baseline comfort level  Description: Interventions:  - Encourage patient to monitor pain and request assistance  - Assess pain using appropriate pain scale  - Administer analgesics based on type and severity of pain and evaluate response  - Implement non-pharmacological measures as appropriate and evaluate response  - Consider cultural and social influences on pain and pain management  - Notify physician/advanced practitioner if interventions unsuccessful or patient reports new pain  Outcome: Progressing     Problem: INFECTION - ADULT  Goal: Absence or prevention of progression during hospitalization  Description: INTERVENTIONS:  - Assess and monitor for signs and symptoms of infection  - Monitor lab/diagnostic results  - Monitor all insertion sites, i.e. indwelling lines, tubes, and drains  - Monitor endotracheal if appropriate and nasal secretions for changes in amount and color  - [place] appropriate cooling/warming therapies per order  - Administer medications as ordered  - Instruct and encourage patient and family to  use good hand hygiene technique  - Identify and instruct in appropriate isolation precautions for identified infection/condition  Outcome: Progressing  Goal: Absence of fever/infection during neutropenic period  Description: INTERVENTIONS:  - Monitor WBC    Outcome: Progressing     Problem: SAFETY ADULT  Goal: Patient will remain free of falls  Description: INTERVENTIONS:  - Educate patient/family on patient safety including physical limitations  - Instruct patient to call for assistance with activity   - Consult OT/PT to assist with strengthening/mobility   - Keep Call bell within reach  - Keep bed low and locked with side rails adjusted as appropriate  - Keep care items and personal belongings within reach  - Initiate and maintain comfort rounds  - Make Fall Risk Sign visible to staff  - Offer Toileting every Hours, in advance of need  - Initiate/Maintain alarm  - Obtain necessary fall risk management equipment:   - Apply yellow socks and bracelet for high fall risk patients  - Consider moving patient to room near nurses station  Outcome: Progressing  Goal: Maintain or return to baseline ADL function  Description: INTERVENTIONS:  -  Assess patient's ability to carry out ADLs; assess patient's baseline for ADL function and identify physical deficits which impact ability to perform ADLs (bathing, care of mouth/teeth, toileting, grooming, dressing, etc.)  - Assess/evaluate cause of self-care deficits   - Assess range of motion  - Assess patient's mobility; develop plan if impaired  - Assess patient's need for assistive devices and provide as appropriate  - Encourage maximum independence but intervene and supervise when necessary  - Involve family in performance of ADLs  - Assess for home care needs following discharge   - Consider OT consult to assist with ADL evaluation and planning for discharge  - Provide patient education as appropriate  Outcome: Progressing  Goal: Maintains/Returns to pre admission functional  level  Description: INTERVENTIONS:  - Perform AM-PAC 6 Click Basic Mobility/ Daily Activity assessment daily.  - Set and communicate daily mobility goal to care team and patient/family/caregiver.   - Collaborate with rehabilitation services on mobility goals if consulted  - Perform Range of Motion  times a day.  - Reposition patient every  hours.  - Dangle patient times a day  - Stand patient  times a day  - Ambulate patient  times a day  - Out of bed to chair  times a day   - Out of bed for meals  times a day  - Out of bed for toileting  - Record patient progress and toleration of activity level   Outcome: Progressing     Problem: DISCHARGE PLANNING  Goal: Discharge to home or other facility with appropriate resources  Description: INTERVENTIONS:  - Identify barriers to discharge w/patient and caregiver  - Arrange for needed discharge resources and transportation as appropriate  - Identify discharge learning needs (meds, wound care, etc.)  - Arrange for interpretive services to assist at discharge as needed  - Refer to Case Management Department for coordinating discharge planning if the patient needs post-hospital services based on physician/advanced practitioner order or complex needs related to functional status, cognitive ability, or social support system  Outcome: Progressing     Problem: Knowledge Deficit  Goal: Patient/family/caregiver demonstrates understanding of disease process, treatment plan, medications, and discharge instructions  Description: Complete learning assessment and assess knowledge base.  Interventions:  - Provide teaching at level of understanding  - Provide teaching via preferred learning methods  Outcome: Progressing

## 2024-02-04 NOTE — PROGRESS NOTES
[facility]  Progress Note  Name: Loida Duarte I  MRN: [id number]  Unit/Bed#: S -01 I Date of Admission: 1/28/2024   Date of Service: 2/4/2024 I Hospital Day: 6    Assessment/Plan   * Severe sepsis (HCC)  Assessment & Plan  As evidenced by initial fever (100.7), tachycardia (110), tachypnea (26), leukocytosis (30), Lactate 3.8  Source: Urosepsis, bacteremia  CT chest/ABD/pelvis - 6mm calculus in the right ureterovesical junction and moderate hydronephrosis. Signs of pyelonephritis  Procalcitonin - 230  Received 500cc resuscitation fluid in ED, not 30cc/kg given concerns for respiratory status as pt noted to be tachyneic, hypoxic and wheezing after receiving IVF  Blood cultures x 2 drawn - positive for E faecalis and E. coli  UA: +leukocytes, +WBC, occasional bacteria, negative nitrites  Received Vancomycin, Cefepime, Cipro in ED  Blood cultures positive for E coli and enterococcus faecalis, urine culture positive for gram negative rods     Plan:  Currently on ampicillin and ceftriaxone for synergy, duration of antibiotic therapy to be determined  LUC to evaluate for endocarditis given bacteremia is planned on Monday.  ID consulted, recommendations appreciated  Follow repeat blood cultures, obtained 2/2/2024.  No growth at 48 hours  Trend WBC, fever curve       Elevated troponin  Assessment & Plan  Pt noted to be SOB, wheezing, tachypnea. Endorsed chest pain while in OR.  hsTroponin elevated  BNP - 2,737  CXR unremarkable  Cardiac echo 1/29 showed normal EF 65%, and grade 1 diastolic dysfunction with hypokinesis of basal inferoseptal and inferior walls   Concern for Type II MI per cardiology secondary to sepsis/hypoxia, although there is concern for CAD and she will eventually need cath     Plan:  Cardiology consulted, appreciate recommendations  Continue ASA/Plavix.  Subcu heparin for DVT prophylaxis  Cardiac cath planned for 2/5/2024  LUC planned 2/5/2024  Continue supplemental  oxygen, wean as tolerated    Obstructive pyelonephritis  Assessment & Plan  Patient had right-sided ureterovesical calculus and hydronephrosis  Patient had urology consulted. Cystoscopy ureteroscopy and stent placement completed       Right ureteral calculus  Assessment & Plan  CT chest/ABD/pelvis - 6mm calculus in the right ureterovesical junction and moderate hydronephrosis.  Urology consulted in ED and patient was taken to OR for cysto with stent placement on 1/29/24    Plan:  Continue Abx  Maintain navas catheter  Monitor I&O  Monitor renal function    RAKEL (acute kidney injury) (HCC)  Assessment & Plan  Lab Results   Component Value Date    CREATININE 1.27 02/04/2024    CREATININE 1.38 (H) 02/03/2024    CREATININE 1.56 (H) 02/02/2024      Initial sCr - 2.58  Unknown baseline sCr   Likely worsened in setting of severe sepsis, obstructing renal calculus  Received 500cc bolus in ED, additional fluids initially held given respiratory status, elevated BNP and concern for volume overload.     Today creatinine continue to improve    Plan:  Maintain navas catheter  Strict I&O  Avoid nephrotoxins and hypotension  Monitor and replete electrolytes  Repeat BMP daily  Continue with gentle hydration    Acute respiratory insufficiency  Assessment & Plan  Pt presented with tachypnea, increased WOB and audible expiratory wheezing, SpO2 88% on room air.  No known pulmonary history, briefly smoked cigarettes many years ago.  Spo2 improved on 2L NC but wheezes persist. Albuterol x1 given in ED with minimal improvement.   LS diminished with wheeze in upper airway, no stridor  CXR - no acute cardiopulmonary abnormalities.  CT chest - No acute findings, no consolidation, pneumothorax, pleural effusion  BNP - 2,737  Initially thought to be volume overload, but now concern for undiagnosed COPD     Plan:  Continue supplemental oxygen for goal SpO2 > 88%, given likely undiagnosed COPD, currently on 1-2L NC  Suspect component of  undiagnosed sleep apnea, continue 2 L NC qhs, consider CPAP  Atrovent and Xopenex as needed TID, patient feels improved  Encourage incentive spirometry       Anemia  Assessment & Plan  Lab Results   Component Value Date    HGB 11.3 (L) 02/04/2024    HGB 11.2 (L) 02/03/2024    HGB 10.7 (L) 02/02/2024    Plan as above    Heme positive stool  Assessment & Plan  Lab Results   Component Value Date    HGB 11.3 (L) 02/04/2024    HGB 11.2 (L) 02/03/2024    HGB 10.7 (L) 02/02/2024      - Patient had black, heme positive stools on AM of 1/31/24 during hospitalization   - GI consulted, appreciate recommendations   - Underwent EGD on 1/31/24 to rule out upper GI bleed, which revealed 2 ulcers (stomach and duodenum) but no active bleeding-negative for H. pylori    Plan:  Continue to monitor  Pantoprazole 4 mg twice daily  Repeat EGD in 3 months as outpatient    Electrolyte abnormality  Assessment & Plan  Lab Results   Component Value Date    CALCIUM 7.4 (L) 02/04/2024    CALCIUM 7.2 (L) 02/03/2024   Calcium low at this point, previously corrected to normal levels.  Giving vitamin D and calcium supplementation  Continue to monitor    Hypertension  Assessment & Plan  - Likely undiagnosed chronic hypertension  - Consistently 190s/80s  - Started Amlodipine 5 mg daily   - Labetalol 10 mg or Hydralazine 5 mg PRN for SBP > 180       Thrombocytopenia (HCC)-resolved as of 2/4/2024  Assessment & Plan  Lab Results   Component Value Date     02/04/2024     (L) 02/03/2024    PLT 99 (L) 02/02/2024   Platelets improved today    - Continue to trend   - Likely secondary to sepsis  - Low suspicion for HIT               VTE Pharmacologic Prophylaxis: VTE Score: 8 High Risk (Score >/= 5) - Pharmacological DVT Prophylaxis Ordered: heparin. Sequential Compression Devices Ordered.    Mobility:   Basic Mobility Inpatient Raw Score: 11  -HLM Goal: 4: Move to chair/commode  JH-HLM Achieved: 2: Bed activities/Dependent transfer  HLM Goal  NOT achieved. Continue with multidisciplinary rounding and encourage appropriate mobility to improve upon HLM goals.    Patient Centered Rounds: I performed bedside rounds with nursing staff today.  Discussions with Specialists or Other Care Team Provider: Attending physician, senior resident, infectious disease    Education and Discussions with Family / Patient: Updated  (daughter) at bedside.    Current Length of Stay: 6 day(s)  Current Patient Status: Inpatient   Discharge Plan: Anticipate discharge in >72 hrs to rehab facility.    Code Status: Level 1 - Full Code    Subjective:   Evaluated patient at bedside this morning, patient was resting comfortably in bed.  Stated that she had just eaten breakfast and was without any symptoms at this time.  No longer complaining of abdominal pain, stated secondary to being able to eat.  Denies any lightheadedness, dizziness, chest pains, shortness of breath, abdominal pains, melena, hematochezia, N/V/D.    Objective:     Vitals:   Temp (24hrs), Av °F (37.2 °C), Min:98.5 °F (36.9 °C), Max:99.4 °F (37.4 °C)    Temp:  [98.5 °F (36.9 °C)-99.4 °F (37.4 °C)] 98.5 °F (36.9 °C)  HR:  [73-96] 96  Resp:  [16-17] 16  BP: (142-190)/(68-90) 170/78  SpO2:  [92 %-96 %] 92 %  Body mass index is 53.89 kg/m².     Input and Output Summary (last 24 hours):     Intake/Output Summary (Last 24 hours) at 2024 1134  Last data filed at 2024 0843  Gross per 24 hour   Intake 120 ml   Output 1725 ml   Net -1605 ml       Physical Exam:   Physical Exam  Vitals and nursing note reviewed.   Constitutional:       General: She is not in acute distress.     Appearance: She is well-developed. She is obese.   HENT:      Head: Normocephalic and atraumatic.   Eyes:      Conjunctiva/sclera: Conjunctivae normal.   Cardiovascular:      Rate and Rhythm: Normal rate and regular rhythm.      Heart sounds: No murmur heard.  Pulmonary:      Effort: Pulmonary effort is normal. No respiratory  distress.      Breath sounds: Normal breath sounds.      Comments: Auscultated anteriorly due to patient's body habitus  On 3 L, saturating well  Coarse upper airway sounds, otherwise lungs clear to auscultation  Abdominal:      General: Bowel sounds are normal.      Palpations: Abdomen is soft.      Tenderness: There is no abdominal tenderness.   Musculoskeletal:         General: No swelling.   Skin:     General: Skin is warm and dry.   Neurological:      Mental Status: She is alert and oriented to person, place, and time.   Psychiatric:         Mood and Affect: Mood normal.          Additional Data:     Labs:  Results from last 7 days   Lab Units 02/04/24  0458 01/30/24  0615 01/29/24  0543   WBC Thousand/uL 16.24*   < > 25.47*   HEMOGLOBIN g/dL 11.3*   < > 12.8   HEMATOCRIT % 35.5   < > 39.1   PLATELETS Thousands/uL 182   < > 121*   BANDS PCT % 3   < >  --    NEUTROS PCT %  --   --  86*   LYMPHS PCT %  --   --  2*   LYMPHO PCT % 16   < >  --    MONOS PCT %  --   --  5   MONO PCT % 11   < >  --    EOS PCT % 3   < > 0    < > = values in this interval not displayed.     Results from last 7 days   Lab Units 02/04/24  0458 02/03/24  0523   SODIUM mmol/L 137 136   POTASSIUM mmol/L 3.7 4.1   CHLORIDE mmol/L 107 104   CO2 mmol/L 21 25   BUN mg/dL 25 31*   CREATININE mg/dL 1.27 1.38*   ANION GAP mmol/L 9 7   CALCIUM mg/dL 7.4* 7.2*   ALBUMIN g/dL  --  2.8*   TOTAL BILIRUBIN mg/dL  --  0.47   ALK PHOS U/L  --  74   ALT U/L  --  11   AST U/L  --  20   GLUCOSE RANDOM mg/dL 100 78     Results from last 7 days   Lab Units 01/29/24  0748   INR  1.43*         Results from last 7 days   Lab Units 01/29/24  0543   HEMOGLOBIN A1C % 5.3     Results from last 7 days   Lab Units 01/29/24  0410 01/29/24  0210 01/29/24  0026 01/28/24  2126   LACTIC ACID mmol/L 1.9 2.4* 2.0 3.8*   PROCALCITONIN ng/ml  --   --   --  230.37*       Lines/Drains:  Invasive Devices       Peripheral Intravenous Line  Duration             Peripheral IV 02/01/24  Dorsal (posterior);Left Forearm 2 days              Drain  Duration             Urethral Catheter Latex;Double-lumen 18 Fr. 6 days                  Urinary Catheter:  Goal for removal: Voiding trial when ambulation improves           Telemetry:  Telemetry Orders (From admission, onward)               24 Hour Telemetry Monitoring  Continuous x 24 Hours (Telem)        Question:  Reason for 24 Hour Telemetry  Answer:  PCI/EP study (including pacer and ICD implementation), Cardiac surgery, MI, abnormal cardiac cath, and chest pain- rule out MI                     Telemetry Reviewed: Normal Sinus Rhythm  Indication for Continued Telemetry Use: Acute MI/Unstable Angina/Rule out ACS             Imaging: No pertinent imaging reviewed.    Recent Cultures (last 7 days):   Results from last 7 days   Lab Units 02/02/24  0453 01/30/24  0909 01/30/24  0836 01/29/24  0154 01/28/24  2230 01/28/24  2131 01/28/24  2126   BLOOD CULTURE  No Growth at 48 hrs.  No Growth at 48 hrs. No Growth After 4 Days. Enterococcus faecalis*  --   --  Escherichia coli*  Enterococcus faecalis* Escherichia coli*  Enterococcus faecalis*   GRAM STAIN RESULT   --   --  Gram positive cocci in pairs and chains*  --   --  Gram negative rods*  Gram positive cocci in pairs* Gram negative rods*  Gram positive cocci in pairs*   URINE CULTURE   --   --   --  10,000-19,000 cfu/ml Enterococcus faecalis* >100,000 cfu/ml Escherichia coli*  --   --        Last 24 Hours Medication List:   Current Facility-Administered Medications   Medication Dose Route Frequency Provider Last Rate    acetaminophen  975 mg Oral Q8H PRN Luiz Calle MD      albuterol  2 puff Inhalation Q6H Frank Reynolds MD      amLODIPine  10 mg Oral Daily Jefe Tenorio MD      ampicillin  2,000 mg Intravenous Q6H Darren Carrasco MD 2,000 mg (02/04/24 0958)    aspirin  81 mg Oral Daily Luiz Calle MD      atorvastatin  80 mg Oral QPM Luiz Calle MD       [START ON 2/5/2024] calcium carbonate-vitamin D  1 tablet Oral Daily With Breakfast John Omer MD      cefTRIAXone  2,000 mg Intravenous Q12H Luiz Calle MD 2,000 mg (02/04/24 0909)    chlorhexidine  15 mL Mouth/Throat Q12H GUNJAN Luiz Calle MD      clopidogrel  75 mg Oral Daily Luiz Calle MD      diphenhydrAMINE (BENADRYL) 25 mg in sodium chloride 0.9 % 50 mL IVPB  25 mg Intravenous Q6H PRN Luiz Calle MD      EPINEPHrine PF  0.3 mg Intramuscular Once PRN Luiz Calle MD      heparin (porcine)  5,000 Units Subcutaneous Q8H GUNJAN Luiz Calle MD      hydrALAZINE  10 mg Intravenous Q8H PRN Luiz Calle MD      ipratropium  0.5 mg Nebulization TID PRN John Omer MD      levalbuterol  1.25 mg Nebulization TID PRN John Omer MD      melatonin  6 mg Oral HS Luiz Calle MD      multi-electrolyte  50 mL/hr Intravenous Continuous Yolanda Hunter MD 50 mL/hr (02/03/24 1211)    pantoprazole  40 mg Oral BID AC Luiz Calle MD      sodium chloride  2 spray Each Nare Daily Yessica Geiger MD      trimethobenzamide  200 mg Intramuscular Q6H PRN Luiz Calle MD          Today, Patient Was Seen By: John Omer MD    **Please Note: This note may have been constructed using a voice recognition system.**

## 2024-02-04 NOTE — QUICK NOTE
Patient aware of the risk of contrast associated nephropathy with administration of iodinated contrast for left heart catheterization.  Benefits of procedure currently outweigh the risks as kidney function has currently improved since admission.  Patient is agreeable to proceed.  Please refer to nephrology progress note from earlier today for more details.

## 2024-02-04 NOTE — PROGRESS NOTES
Progress Note - Infectious Disease   Loida Duarte 78 y.o. female MRN: [id number]  Unit/Bed#: S -01 Encounter: [id number]      Impression/Plan:  Sepsis. POA. E/b fever to 100.7F, tachycardia, leukocytosis to 27, and lactic acidosis on admission. Most likely I/s/o #2, #3, and #4. CT C/A/P on admission showed a 6mm right distal ureteral calculus with right-sided hydronephrosis with perinephric and periureteral stranding. CT chest did not show any evidence of consolidation. Moderate cardiomegaly noted. COVID/Flu/RSV negative. UA obtained with innumerable WBC, large LE, and moderate blood c/f urinary source. Remains afebrile and HDS. WBC improved from 30 to 22 today. No new symptoms per patient. Urine culture from straight cath resulted positive for E.coli, Ucx from cystoscopy positive for E.faecalis. Now with c/f GI bleed. GI consulted for EGD.  EGD negative for a bleeding source.  Repeat blood cultures negative thus far.  White blood cell count was slightly increased.  Antibiotics as below  Recheck blood cultures as below  Supportive care  Recheck CBC with differential to make sure white count does not continue to increase     Polymicrobial bacteremia. Blood cultures on admission positive both sets (2/2) for GNRs and GPCs in pairs identified as E.coli and E.faecalis. Most likely etiology #3 and #4. TTE obtained showed moderate thickening of the mitral valve. Favor LUC to further assess for endocarditis.  Repeat blood cultures positive for gram-positive cocci concerning for persistent Enterococcus bacteremia.  LUC delayed due to previous GI bleeding, now due to respiratory status.  Continue intravenous ampicillin  Continue ceftriaxone for synergy in the setting of possible endocarditis  Follow-up repeat blood cultures to make sure bacteremia clearing  Check transesophageal echocardiogram if felt to be safe to do so  Recheck CBC with differential and CMP to make sure not developing toxicity  If blood cultures  remain negative tomorrow, okay to place PICC line     UTI complicated by pyelonephritis. Likely I/s/o #4. Likely etiology of #2. UA obtained in ED with innumerable WBC, large leukocytes, and moderate blood. CT A/P on admission showed #4 and perinephric and periureteral stranding. Ucx from straight cath resulted positive for E.coli, Ucx from cystoscopy positive for E.faecalis.   Antibiotics as above  No additional urologic workup for now     Right distal ureteral calculus c/b right-sided hydronephrosis. Likely etiology of #3. CT A/P on admission showed a 6mm right UVJ calculus. Now s/p OR on 1/29 for right ureteral stent placement with Urology.   Close urology follow-up.     Elevated troponin. Cardiology consulted. Suspected nonischemic myocardial injury in setting of sepsis. Started on IV Heparin, ASA, statin, and Plavix.   Management per cardiology     RAKEL. Scr elevated to 2.8 on admission. Unknown baseline as patient has not seen a doctor in ~50 years. Consider pre-renal I/s/o sepsis vs obstructive I/s/o renal calculus.  Renal function continues to improve  Dose adjust antibiotics as needed  Volume management  Recheck BMP to see if need to dose adjust the antibiotics further     Acute hypoxic respiratory failure. POA. Noted to be 88% on room air. Also with diffuse wheezing on exam. CT chest demonstrated no acute pulmonary abnormalities. Showed cardiomegaly.  Still requiring oxygen support consider pulmonary edema.  Decreased oxygen needs  Monitor respiratory status  Oxygen support     Penicillin allergy- resolved. Had an allergy listed as swelling. States she had a rash as a child, but has not had it since. Has not seen a doctor in 50 years. Passed an amoxicillin challenge on 1/30. Tolerating ampicillin without difficulty.   No longer a true allergy      Morbid obesity. BMI noted to be 51 on admission. May affect antibiotic dosing.     Discussed with the primary service the plan to continue the ampicillin and  ceftriaxone for now and to recheck the CBC with differential tomorrow.  They agree with the plan.    Discussed with the patient's daughter, Lillie.    Antibiotics:  Ampicillin 5  Ceftriaxone 4  Antibiotics 8  Negative blood cultures 2    Subjective:  Patient has no fever, chills, sweats; no nausea, vomiting, had loose stool yesterday; no cough, shortness of breath; no pain. No new symptoms.    Objective:  Vitals:  Temp:  [98.5 °F (36.9 °C)-99.4 °F (37.4 °C)] 98.5 °F (36.9 °C)  HR:  [73-96] 96  Resp:  [16-17] 16  BP: (142-190)/(68-90) 170/78  SpO2:  [92 %-96 %] 92 %  Temp (24hrs), Av °F (37.2 °C), Min:98.5 °F (36.9 °C), Max:99.4 °F (37.4 °C)  Current: Temperature: 98.5 °F (36.9 °C)    Physical Exam:   General Appearance:  Alert, interactive, nontoxic, no acute distress.   Throat: Oropharynx moist without lesions.    Lungs:   Decreased breath sounds bilaterally; no wheezes, rhonchi or rales; respirations unlabored   Heart:  RRR; no murmur, rub or gallop   Abdomen:   Soft, non-tender, non-distended, positive bowel sounds.     Extremities: No clubbing, cyanosis or edema   Skin: No new rashes or lesions. No draining wounds noted.       Labs, Imaging, & Other studies:   All pertinent labs and imaging studies were personally reviewed  Results from last 7 days   Lab Units 24  0458 24  0523 24  0503   WBC Thousand/uL 16.24* 13.71* 12.13*   HEMOGLOBIN g/dL 11.3* 11.2* 10.7*   PLATELETS Thousands/uL 182 126* 99*     Results from last 7 days   Lab Units 24  0458 24  0523 24  0503 24  0615 24  0543 24  0210 24  2126   SODIUM mmol/L 137 136 136   < > 134*   < > 135   POTASSIUM mmol/L 3.7 4.1 3.9   < > 4.6   < > 3.7   CHLORIDE mmol/L 107 104 105   < > 100   < > 99   CO2 mmol/L 21 25 22   < > 23   < > 23   BUN mg/dL 25 31* 43*   < > 40*   < > 34*   CREATININE mg/dL 1.27 1.38* 1.56*   < > 2.80*   < > 2.58*   EGFR ml/min/1.73sq m 39 36 31   < > 15   < > 16   CALCIUM  mg/dL 7.4* 7.2* 7.6*   < > 7.9*   < > 8.8   AST U/L  --  20  --   --  64*  --  46*   ALT U/L  --  11  --   --  16  --  11   ALK PHOS U/L  --  74  --   --  60  --  69    < > = values in this interval not displayed.     Results from last 7 days   Lab Units 02/02/24  0453 01/30/24  0909 01/30/24  0836 01/29/24  1046 01/29/24  0154 01/28/24 2230 01/28/24 2131 01/28/24 2126   BLOOD CULTURE  No Growth at 48 hrs.  No Growth at 48 hrs. No Growth After 4 Days. Enterococcus faecalis*  --   --   --  Escherichia coli*  Enterococcus faecalis* Escherichia coli*  Enterococcus faecalis*   GRAM STAIN RESULT   --   --  Gram positive cocci in pairs and chains*  --   --   --  Gram negative rods*  Gram positive cocci in pairs* Gram negative rods*  Gram positive cocci in pairs*   URINE CULTURE   --   --   --   --  10,000-19,000 cfu/ml Enterococcus faecalis* >100,000 cfu/ml Escherichia coli*  --   --    MRSA CULTURE ONLY   --   --   --  No Methicillin Resistant Staphlyococcus aureus (MRSA) isolated  --   --   --   --      Results from last 7 days   Lab Units 01/28/24 2126   PROCALCITONIN ng/ml 230.37*

## 2024-02-05 ENCOUNTER — ANESTHESIA (INPATIENT)
Dept: NON INVASIVE DIAGNOSTICS | Facility: HOSPITAL | Age: 79
DRG: 853 | End: 2024-02-05
Payer: MEDICARE

## 2024-02-05 LAB
ALBUMIN SERPL BCP-MCNC: 2.5 G/DL (ref 3.5–5)
ALP SERPL-CCNC: 80 U/L (ref 34–104)
ALT SERPL W P-5'-P-CCNC: 9 U/L (ref 7–52)
ANION GAP SERPL CALCULATED.3IONS-SCNC: 7 MMOL/L
AST SERPL W P-5'-P-CCNC: 15 U/L (ref 13–39)
BASOPHILS # BLD MANUAL: 0 THOUSAND/UL (ref 0–0.1)
BASOPHILS NFR MAR MANUAL: 0 % (ref 0–1)
BILIRUB SERPL-MCNC: 0.36 MG/DL (ref 0.2–1)
BUN SERPL-MCNC: 21 MG/DL (ref 5–25)
CALCIUM ALBUM COR SERPL-MCNC: 8.5 MG/DL (ref 8.3–10.1)
CALCIUM SERPL-MCNC: 7.3 MG/DL (ref 8.4–10.2)
CHLORIDE SERPL-SCNC: 106 MMOL/L (ref 96–108)
CO2 SERPL-SCNC: 25 MMOL/L (ref 21–32)
CREAT SERPL-MCNC: 1.19 MG/DL (ref 0.6–1.3)
EOSINOPHIL # BLD MANUAL: 0 THOUSAND/UL (ref 0–0.4)
EOSINOPHIL NFR BLD MANUAL: 0 % (ref 0–6)
ERYTHROCYTE [DISTWIDTH] IN BLOOD BY AUTOMATED COUNT: 15 % (ref 11.6–15.1)
GFR SERPL CREATININE-BSD FRML MDRD: 43 ML/MIN/1.73SQ M
GLUCOSE SERPL-MCNC: 85 MG/DL (ref 65–140)
HCT VFR BLD AUTO: 34.3 % (ref 34.8–46.1)
HGB BLD-MCNC: 10.9 G/DL (ref 11.5–15.4)
INR PPP: 1.7 (ref 0.84–1.19)
LYMPHOCYTES # BLD AUTO: 13 % (ref 14–44)
LYMPHOCYTES # BLD AUTO: 2.69 THOUSAND/UL (ref 0.6–4.47)
MAGNESIUM SERPL-MCNC: 2.1 MG/DL (ref 1.9–2.7)
MCH RBC QN AUTO: 31.4 PG (ref 26.8–34.3)
MCHC RBC AUTO-ENTMCNC: 31.8 G/DL (ref 31.4–37.4)
MCV RBC AUTO: 99 FL (ref 82–98)
MONOCYTES # BLD AUTO: 1.65 THOUSAND/UL (ref 0–1.22)
MONOCYTES NFR BLD: 8 % (ref 4–12)
NEUTROPHILS # BLD MANUAL: 16.34 THOUSAND/UL (ref 1.85–7.62)
NEUTS BAND NFR BLD MANUAL: 1 % (ref 0–8)
NEUTS SEG NFR BLD AUTO: 78 % (ref 43–75)
PLATELET # BLD AUTO: 214 THOUSANDS/UL (ref 149–390)
PLATELET BLD QL SMEAR: ADEQUATE
PMV BLD AUTO: 10.9 FL (ref 8.9–12.7)
POTASSIUM SERPL-SCNC: 4.2 MMOL/L (ref 3.5–5.3)
PROT SERPL-MCNC: 5.9 G/DL (ref 6.4–8.4)
PROTHROMBIN TIME: 20.8 SECONDS (ref 11.6–14.5)
RBC # BLD AUTO: 3.47 MILLION/UL (ref 3.81–5.12)
RBC MORPH BLD: NORMAL
SODIUM SERPL-SCNC: 138 MMOL/L (ref 135–147)
WBC # BLD AUTO: 20.68 THOUSAND/UL (ref 4.31–10.16)

## 2024-02-05 PROCEDURE — 94640 AIRWAY INHALATION TREATMENT: CPT

## 2024-02-05 PROCEDURE — 94664 DEMO&/EVAL PT USE INHALER: CPT

## 2024-02-05 PROCEDURE — 80053 COMPREHEN METABOLIC PANEL: CPT | Performed by: NURSE PRACTITIONER

## 2024-02-05 PROCEDURE — 85610 PROTHROMBIN TIME: CPT | Performed by: INTERNAL MEDICINE

## 2024-02-05 PROCEDURE — 99232 SBSQ HOSP IP/OBS MODERATE 35: CPT | Performed by: INTERNAL MEDICINE

## 2024-02-05 PROCEDURE — 85027 COMPLETE CBC AUTOMATED: CPT | Performed by: NURSE PRACTITIONER

## 2024-02-05 PROCEDURE — 93005 ELECTROCARDIOGRAM TRACING: CPT

## 2024-02-05 PROCEDURE — 93312 ECHO TRANSESOPHAGEAL: CPT

## 2024-02-05 PROCEDURE — 94760 N-INVAS EAR/PLS OXIMETRY 1: CPT

## 2024-02-05 PROCEDURE — 83735 ASSAY OF MAGNESIUM: CPT | Performed by: INTERNAL MEDICINE

## 2024-02-05 PROCEDURE — 85007 BL SMEAR W/DIFF WBC COUNT: CPT | Performed by: NURSE PRACTITIONER

## 2024-02-05 RX ORDER — KETAMINE HCL IN NACL, ISO-OSM 100MG/10ML
SYRINGE (ML) INJECTION AS NEEDED
Status: DISCONTINUED | OUTPATIENT
Start: 2024-02-05 | End: 2024-02-05

## 2024-02-05 RX ORDER — DIPHENHYDRAMINE HYDROCHLORIDE AND LIDOCAINE HYDROCHLORIDE AND ALUMINUM HYDROXIDE AND MAGNESIUM HYDRO
10 KIT EVERY 6 HOURS PRN
Status: DISCONTINUED | OUTPATIENT
Start: 2024-02-05 | End: 2024-02-08 | Stop reason: HOSPADM

## 2024-02-05 RX ORDER — SODIUM CHLORIDE 9 MG/ML
50 INJECTION, SOLUTION INTRAVENOUS CONTINUOUS
Status: DISCONTINUED | OUTPATIENT
Start: 2024-02-05 | End: 2024-02-06

## 2024-02-05 RX ORDER — ALBUTEROL SULFATE 90 UG/1
2 AEROSOL, METERED RESPIRATORY (INHALATION) EVERY 4 HOURS PRN
Status: DISCONTINUED | OUTPATIENT
Start: 2024-02-05 | End: 2024-02-08 | Stop reason: HOSPADM

## 2024-02-05 RX ORDER — HYDROXYZINE HYDROCHLORIDE 25 MG/1
25 TABLET, FILM COATED ORAL ONCE
Status: COMPLETED | OUTPATIENT
Start: 2024-02-05 | End: 2024-02-05

## 2024-02-05 RX ORDER — PROPOFOL 10 MG/ML
INJECTION, EMULSION INTRAVENOUS AS NEEDED
Status: DISCONTINUED | OUTPATIENT
Start: 2024-02-05 | End: 2024-02-05

## 2024-02-05 RX ORDER — SODIUM CHLORIDE, SODIUM LACTATE, POTASSIUM CHLORIDE, CALCIUM CHLORIDE 600; 310; 30; 20 MG/100ML; MG/100ML; MG/100ML; MG/100ML
INJECTION, SOLUTION INTRAVENOUS CONTINUOUS PRN
Status: DISCONTINUED | OUTPATIENT
Start: 2024-02-05 | End: 2024-02-05

## 2024-02-05 RX ORDER — LIDOCAINE HYDROCHLORIDE 10 MG/ML
INJECTION, SOLUTION EPIDURAL; INFILTRATION; INTRACAUDAL; PERINEURAL AS NEEDED
Status: DISCONTINUED | OUTPATIENT
Start: 2024-02-05 | End: 2024-02-05

## 2024-02-05 RX ORDER — GLYCOPYRROLATE 0.2 MG/ML
INJECTION INTRAMUSCULAR; INTRAVENOUS AS NEEDED
Status: DISCONTINUED | OUTPATIENT
Start: 2024-02-05 | End: 2024-02-05

## 2024-02-05 RX ADMIN — ACETAMINOPHEN 975 MG: 325 TABLET, FILM COATED ORAL at 22:37

## 2024-02-05 RX ADMIN — Medication 10 MG: at 12:10

## 2024-02-05 RX ADMIN — HEPARIN SODIUM 5000 UNITS: 5000 INJECTION INTRAVENOUS; SUBCUTANEOUS at 05:30

## 2024-02-05 RX ADMIN — SODIUM CHLORIDE 50 ML/HR: 0.9 INJECTION, SOLUTION INTRAVENOUS at 09:38

## 2024-02-05 RX ADMIN — SODIUM CHLORIDE, SODIUM LACTATE, POTASSIUM CHLORIDE, AND CALCIUM CHLORIDE: .6; .31; .03; .02 INJECTION, SOLUTION INTRAVENOUS at 11:51

## 2024-02-05 RX ADMIN — Medication 10 MG: at 12:12

## 2024-02-05 RX ADMIN — SALINE NASAL SPRAY 2 SPRAY: 1.5 SOLUTION NASAL at 08:07

## 2024-02-05 RX ADMIN — PROPOFOL 50 MG: 10 INJECTION, EMULSION INTRAVENOUS at 12:10

## 2024-02-05 RX ADMIN — PANTOPRAZOLE SODIUM 40 MG: 40 TABLET, DELAYED RELEASE ORAL at 05:30

## 2024-02-05 RX ADMIN — HYDROXYZINE HYDROCHLORIDE 25 MG: 25 TABLET, FILM COATED ORAL at 02:30

## 2024-02-05 RX ADMIN — Medication 1 TABLET: at 08:02

## 2024-02-05 RX ADMIN — HEPARIN SODIUM 5000 UNITS: 5000 INJECTION INTRAVENOUS; SUBCUTANEOUS at 14:31

## 2024-02-05 RX ADMIN — LIDOCAINE HYDROCHLORIDE 50 MG: 10 INJECTION, SOLUTION EPIDURAL; INFILTRATION; INTRACAUDAL; PERINEURAL at 12:10

## 2024-02-05 RX ADMIN — Medication 6 MG: at 22:37

## 2024-02-05 RX ADMIN — CEFTRIAXONE 2000 MG: 2 INJECTION, POWDER, FOR SOLUTION INTRAMUSCULAR; INTRAVENOUS at 08:02

## 2024-02-05 RX ADMIN — AMPICILLIN SODIUM 2000 MG: 2 INJECTION, POWDER, FOR SOLUTION INTRAVENOUS at 22:58

## 2024-02-05 RX ADMIN — PROPOFOL 50 MG: 10 INJECTION, EMULSION INTRAVENOUS at 12:33

## 2024-02-05 RX ADMIN — ALBUTEROL SULFATE 2 PUFF: 90 AEROSOL, METERED RESPIRATORY (INHALATION) at 07:24

## 2024-02-05 RX ADMIN — AMPICILLIN SODIUM 2000 MG: 2 INJECTION, POWDER, FOR SOLUTION INTRAVENOUS at 04:07

## 2024-02-05 RX ADMIN — DIPHENHYDRAMINE HYDROCHLORIDE AND LIDOCAINE HYDROCHLORIDE AND ALUMINUM HYDROXIDE AND MAGNESIUM HYDRO 10 ML: KIT at 19:14

## 2024-02-05 RX ADMIN — CLOPIDOGREL BISULFATE 75 MG: 75 TABLET ORAL at 08:02

## 2024-02-05 RX ADMIN — PROPOFOL 50 MG: 10 INJECTION, EMULSION INTRAVENOUS at 12:18

## 2024-02-05 RX ADMIN — AMPICILLIN SODIUM 2000 MG: 2 INJECTION, POWDER, FOR SOLUTION INTRAVENOUS at 17:17

## 2024-02-05 RX ADMIN — ATORVASTATIN CALCIUM 80 MG: 40 TABLET, FILM COATED ORAL at 17:17

## 2024-02-05 RX ADMIN — HEPARIN SODIUM 5000 UNITS: 5000 INJECTION INTRAVENOUS; SUBCUTANEOUS at 22:37

## 2024-02-05 RX ADMIN — PROPOFOL 50 MG: 10 INJECTION, EMULSION INTRAVENOUS at 12:24

## 2024-02-05 RX ADMIN — PROPOFOL 30 MG: 10 INJECTION, EMULSION INTRAVENOUS at 12:11

## 2024-02-05 RX ADMIN — PROPOFOL 20 MG: 10 INJECTION, EMULSION INTRAVENOUS at 12:12

## 2024-02-05 RX ADMIN — ASPIRIN 81 MG: 81 TABLET, COATED ORAL at 08:02

## 2024-02-05 RX ADMIN — CEFTRIAXONE 2000 MG: 2 INJECTION, POWDER, FOR SOLUTION INTRAMUSCULAR; INTRAVENOUS at 21:58

## 2024-02-05 RX ADMIN — GLYCOPYRROLATE 0.2 MG: 0.2 INJECTION INTRAMUSCULAR; INTRAVENOUS at 12:04

## 2024-02-05 RX ADMIN — PANTOPRAZOLE SODIUM 40 MG: 40 TABLET, DELAYED RELEASE ORAL at 17:17

## 2024-02-05 RX ADMIN — AMPICILLIN SODIUM 2000 MG: 2 INJECTION, POWDER, FOR SOLUTION INTRAVENOUS at 09:41

## 2024-02-05 RX ADMIN — AMLODIPINE BESYLATE 10 MG: 10 TABLET ORAL at 08:02

## 2024-02-05 RX ADMIN — ALBUTEROL SULFATE 2 PUFF: 90 AEROSOL, METERED RESPIRATORY (INHALATION) at 04:08

## 2024-02-05 NOTE — ASSESSMENT & PLAN NOTE
Pt presented with tachypnea, increased WOB and audible expiratory wheezing, SpO2 88% on room air.  No known pulmonary history, briefly smoked cigarettes many years ago.  Spo2 improved on 2L NC but wheezes persist. Albuterol x1 given in ED with minimal improvement.   LS diminished with wheeze in upper airway, no stridor  CXR - no acute cardiopulmonary abnormalities.  CT chest - No acute findings, no consolidation, pneumothorax, pleural effusion  BNP - 2,737  Initially thought to be volume overload, but now concern for undiagnosed COPD     Plan:  Continue supplemental oxygen for goal SpO2 > 88%, given likely undiagnosed COPD, currently on 1-2L NC  Suspect component of undiagnosed sleep apnea, continue 2 L NC qhs, consider CPAP  Atrovent and Xopenex as needed TID  Encourage incentive spirometry

## 2024-02-05 NOTE — ANESTHESIA PREPROCEDURE EVALUATION
Procedure:  LUC    Relevant Problems   CARDIO   (+) Hypertension      /RENAL   (+) RAKEL (acute kidney injury) (HCC)   (+) Obstructive pyelonephritis      HEMATOLOGY   (+) Anemia     BMI 54     Pt SOB, unable to speak in complete sentences. On O2 via NC, not on home O2.     ECHO 1/29/2024:   Left Ventricle: Left ventricular cavity size is normal. Wall thickness is moderately increased. The left ventricular ejection fraction is 65%. Systolic function is normal. Diastolic function is mildly abnormal, consistent with grade I (abnormal) relaxation.    The following segments are hypokinetic: basal inferoseptal and basal inferior.    All other segments are normal.    Right Ventricle: Right ventricular cavity size is normal. Systolic function is normal.    Left Atrium: The atrium is mildly dilated.    Mitral Valve: There is moderate thickening involving the leaflet margin more than the base. There is mild annular calcification. There is mild regurgitation.    Tricuspid Valve: There is mild regurgitation.    Physical Exam    Airway    Mallampati score: III  TM Distance: >3 FB  Neck ROM: full     Dental   No notable dental hx     Cardiovascular      Pulmonary      Other Findings  post-pubertal.      Anesthesia Plan  ASA Score- 4     Anesthesia Type- IV sedation with anesthesia with ASA Monitors.         Additional Monitors:     Airway Plan:            Plan Factors-    Chart reviewed.    Patient summary reviewed.                  Induction- intravenous.    Postoperative Plan-     Informed Consent- Anesthetic plan and risks discussed with patient.  I personally reviewed this patient with the CRNA. Discussed and agreed on the Anesthesia Plan with the CRNA..

## 2024-02-05 NOTE — ASSESSMENT & PLAN NOTE
Lab Results   Component Value Date     02/05/2024     02/04/2024     (L) 02/03/2024   Platelets improved today    - Continue to trend, resolved  - Likely secondary to sepsis  - Low suspicion for HIT

## 2024-02-05 NOTE — ASSESSMENT & PLAN NOTE
Lab Results   Component Value Date    CREATININE 1.19 02/05/2024    CREATININE 1.27 02/04/2024    CREATININE 1.38 (H) 02/03/2024      Initial sCr - 2.58  Unknown baseline sCr   Likely worsened in setting of severe sepsis, obstructing renal calculus  Received 500cc bolus in ED, additional fluids initially held given respiratory status, elevated BNP and concern for volume overload.     Today creatinine continue to improve    Plan:  Maintain navas catheter  Strict I&O  Avoid nephrotoxins and hypotension  Monitor and replete electrolytes  Repeat BMP daily  Continue with gentle hydration

## 2024-02-05 NOTE — ASSESSMENT & PLAN NOTE
Lab Results   Component Value Date    HGB 10.9 (L) 02/05/2024    HGB 11.3 (L) 02/04/2024    HGB 11.2 (L) 02/03/2024      - Patient had black, heme positive stools on AM of 1/31/24 during hospitalization   - GI consulted, appreciate recommendations   - Underwent EGD on 1/31/24 to rule out upper GI bleed, which revealed 2 ulcers (stomach and duodenum) but no active bleeding-negative for H. pylori    Plan:  Continue to monitor  Pantoprazole 4 mg twice daily  Repeat EGD in 3 months as outpatient

## 2024-02-05 NOTE — PROGRESS NOTES
Progress Note - Infectious Disease   Loida Duarte 78 y.o. female MRN: [id number]  Unit/Bed#: S -01 Encounter: [id number]      Impression/Plan:  Sepsis. POA. E/b fever to 100.7F, tachycardia, leukocytosis to 27, and lactic acidosis on admission. Most likely I/s/o #2, #3, and #4. CT C/A/P on admission showed a 6mm right distal ureteral calculus with right-sided hydronephrosis with perinephric and periureteral stranding. CT chest did not show any evidence of consolidation. Moderate cardiomegaly noted. COVID/Flu/RSV negative. UA obtained with innumerable WBC, large LE, and moderate blood c/f urinary source. Remains afebrile and HDS. WBC improved from 30 to 22 today. No new symptoms per patient. Urine culture from straight cath resulted positive for E.coli, Ucx from cystoscopy positive for E.faecalis. Now with c/f GI bleed. GI consulted for EGD.  EGD negative for a bleeding source.  Repeat blood cultures negative thus far.  White blood cell count has been steadily increasing of unclear source or significant  Antibiotics as below  Recheck blood cultures as below  Supportive care  Recheck CBC with differential to make sure white count does not continue to increase  Additional workup if the white count continues to rise.  Low threshold for stool for C. difficile     Polymicrobial bacteremia. Blood cultures on admission positive both sets (2/2) for GNRs and GPCs in pairs identified as E.coli and E.faecalis. Most likely etiology #3 and #4. TTE obtained showed moderate thickening of the mitral valve. Favor LUC to further assess for endocarditis.  Repeat blood cultures positive for gram-positive cocci concerning for persistent Enterococcus bacteremia.  LUC delayed due to previous GI bleeding, now due to respiratory status.  Continue intravenous ampicillin  Continue ceftriaxone for synergy in the setting of possible endocarditis  Follow-up repeat blood cultures to make sure bacteremia clearing  Check transesophageal  echocardiogram if felt to be safe to do so  Recheck CBC with differential and CMP to make sure not developing toxicity  Place PICC line     UTI complicated by pyelonephritis. Likely I/s/o #4. Likely etiology of #2. UA obtained in ED with innumerable WBC, large leukocytes, and moderate blood. CT A/P on admission showed #4 and perinephric and periureteral stranding. Ucx from straight cath resulted positive for E.coli, Ucx from cystoscopy positive for E.faecalis.   Antibiotics as above  No additional urologic workup for now     Right distal ureteral calculus c/b right-sided hydronephrosis. Likely etiology of #3. CT A/P on admission showed a 6mm right UVJ calculus. Now s/p OR on 1/29 for right ureteral stent placement with Urology.   Close urology follow-up.     Elevated troponin. Cardiology consulted. Suspected nonischemic myocardial injury in setting of sepsis. Started on IV Heparin, ASA, statin, and Plavix.   Patient for coronary angiogram today     RAKEL. Scr elevated to 2.8 on admission. Unknown baseline as patient has not seen a doctor in ~50 years. Consider pre-renal I/s/o sepsis vs obstructive I/s/o renal calculus.  Renal function continues to improve  Dose adjust antibiotics as needed  Volume management  Recheck BMP to see if need to dose adjust the antibiotics further     Acute hypoxic respiratory failure. POA. Noted to be 88% on room air. Also with diffuse wheezing on exam. CT chest demonstrated no acute pulmonary abnormalities. Showed cardiomegaly.  Still requiring oxygen support consider pulmonary edema.  Decreased oxygen needs  Monitor respiratory status  Oxygen support     Penicillin allergy- resolved. Had an allergy listed as swelling. States she had a rash as a child, but has not had it since. Has not seen a doctor in 50 years. Passed an amoxicillin challenge on 1/30. Tolerating ampicillin without difficulty.   No longer a true allergy      Morbid obesity. BMI noted to be 51 on admission. May affect  antibiotic dosing.     Discussed with the primary service the plan to continue the ampicillin and ceftriaxone for now.  They agree with the plan.    Antibiotics:  Ampicillin 6  Ceftriaxone 5  Antibiotics 9  Negative blood cultures 3    Subjective:  Patient has no fever, chills, sweats; no nausea, vomiting, occasional loose stool; no cough, shortness of breath; no pain. No new symptoms.  She is just not feeling well but cannot describe why    Objective:  Vitals:  Temp:  [98.4 °F (36.9 °C)-99.4 °F (37.4 °C)] 99.1 °F (37.3 °C)  HR:  [87] 87  Resp:  [18] 18  BP: (134-169)/(69-83) 169/83  SpO2:  [91 %-96 %] 92 %  Temp (24hrs), Av °F (37.2 °C), Min:98.4 °F (36.9 °C), Max:99.4 °F (37.4 °C)  Current: Temperature: 99.1 °F (37.3 °C)    Physical Exam:   General Appearance:  Alert, interactive, nontoxic, no acute distress.   Throat: Oropharynx moist without lesions.    Lungs:   Decreased breath sounds bilaterally; no wheezes, rhonchi or rales; respirations unlabored   Heart:  RRR; no murmur, rub or gallop   Abdomen:   Soft, non-tender, non-distended, positive bowel sounds.     Extremities: No clubbing, cyanosis or edema   Skin: No new rashes or lesions. No draining wounds noted.       Labs, Imaging, & Other studies:   All pertinent labs and imaging studies were personally reviewed  Results from last 7 days   Lab Units 24  05   WBC Thousand/uL 20.68* 16.24* 13.71*   HEMOGLOBIN g/dL 10.9* 11.3* 11.2*   PLATELETS Thousands/uL 214 182 126*     Results from last 7 days   Lab Units 24  05   SODIUM mmol/L 138 137 136   POTASSIUM mmol/L 4.2 3.7 4.1   CHLORIDE mmol/L 106 107 104   CO2 mmol/L 25 21 25   BUN mg/dL 21 25 31*   CREATININE mg/dL 1.19 1.27 1.38*   EGFR ml/min/1.73sq m 43 39 36   CALCIUM mg/dL 7.3* 7.4* 7.2*   AST U/L 15  --  20   ALT U/L 9  --  11   ALK PHOS U/L 80  --  74     Results from last 7 days   Lab Units 24  0453 24  0909  01/30/24  0836 01/29/24  1046   BLOOD CULTURE  No Growth at 48 hrs.  No Growth at 48 hrs. No Growth After 5 Days. Enterococcus faecalis*  --    GRAM STAIN RESULT   --   --  Gram positive cocci in pairs and chains*  --    MRSA CULTURE ONLY   --   --   --  No Methicillin Resistant Staphlyococcus aureus (MRSA) isolated             Results from last 7 days   Lab Units 02/04/24  0458   FERRITIN ng/mL 193

## 2024-02-05 NOTE — ASSESSMENT & PLAN NOTE
Lab Results   Component Value Date    CALCIUM 7.3 (L) 02/05/2024    CALCIUM 7.4 (L) 02/04/2024   Calcium low at this point, previously corrected to normal levels.  Giving vitamin D and calcium supplementation  Continue to monitor

## 2024-02-05 NOTE — ANESTHESIA POSTPROCEDURE EVALUATION
Post-Op Assessment Note    CV Status:  Stable  Pain Score: 0    Pain management: adequate       Mental Status:  Alert and awake   Hydration Status:  Euvolemic   PONV Controlled:  Controlled   Airway Patency:  Patent     Post Op Vitals Reviewed: Yes    No anethesia notable event occurred.    Staff: CRNA               BP   120/62   Temp      Pulse  84   Resp   18   SpO2   95

## 2024-02-05 NOTE — ASSESSMENT & PLAN NOTE
- Likely undiagnosed chronic hypertension  - Consistently 190s/80s    Plan:  - Started Amlodipine 5 mg daily   - Labetalol 10 mg or Hydralazine 5 mg PRN for SBP > 180

## 2024-02-05 NOTE — PLAN OF CARE
Problem: Potential for Falls  Goal: Patient will remain free of falls  Description: INTERVENTIONS:  - Educate patient/family on patient safety including physical limitations  - Instruct patient to call for assistance with activity   - Consult OT/PT to assist with strengthening/mobility   - Keep Call bell within reach  - Keep bed low and locked with side rails adjusted as appropriate  - Keep care items and personal belongings within reach  - Initiate and maintain comfort rounds  - Apply yellow socks and bracelet for high fall risk patients  - Consider moving patient to room near nurses station  Outcome: Progressing     Problem: Prexisting or High Potential for Compromised Skin Integrity  Goal: Skin integrity is maintained or improved  Description: INTERVENTIONS:  - Identify patients at risk for skin breakdown  - Assess and monitor skin integrity  - Assess and monitor nutrition and hydration status  - Monitor labs   - Assess for incontinence   - Turn and reposition patient  - Assist with mobility/ambulation  - Relieve pressure over bony prominences  - Avoid friction and shearing  - Provide appropriate hygiene as needed including keeping skin clean and dry  - Evaluate need for skin moisturizer/barrier cream  - Collaborate with interdisciplinary team   - Patient/family teaching  - Consider wound care consult   Outcome: Progressing     Problem: Nutrition/Hydration-ADULT  Goal: Nutrient/Hydration intake appropriate for improving, restoring or maintaining nutritional needs  Description: Monitor and assess patient's nutrition/hydration status for malnutrition. Collaborate with interdisciplinary team and initiate plan and interventions as ordered.  Monitor patient's weight and dietary intake as ordered or per policy. Utilize nutrition screening tool and intervene as necessary. Determine patient's food preferences and provide high-protein, high-caloric foods as appropriate.     INTERVENTIONS:  - Monitor oral intake, urinary  output, labs, and treatment plans  - Assess nutrition and hydration status and recommend course of action  - Evaluate amount of meals eaten  - Assist patient with eating if necessary   - Allow adequate time for meals  - Recommend/ encourage appropriate diets, oral nutritional supplements, and vitamin/mineral supplements  - Order, calculate, and assess calorie counts as needed  - Recommend, monitor, and adjust tube feedings and TPN/PPN based on assessed needs  - Assess need for intravenous fluids  - Provide specific nutrition/hydration education as appropriate  - Include patient/family/caregiver in decisions related to nutrition  Outcome: Progressing     Problem: PAIN - ADULT  Goal: Verbalizes/displays adequate comfort level or baseline comfort level  Description: Interventions:  - Encourage patient to monitor pain and request assistance  - Assess pain using appropriate pain scale  - Administer analgesics based on type and severity of pain and evaluate response  - Implement non-pharmacological measures as appropriate and evaluate response  - Consider cultural and social influences on pain and pain management  - Notify physician/advanced practitioner if interventions unsuccessful or patient reports new pain  Outcome: Progressing     Problem: INFECTION - ADULT  Goal: Absence or prevention of progression during hospitalization  Description: INTERVENTIONS:  - Assess and monitor for signs and symptoms of infection  - Monitor lab/diagnostic results  - Monitor all insertion sites, i.e. indwelling lines, tubes, and drains  - Monitor endotracheal if appropriate and nasal secretions for changes in amount and color  - [place] appropriate cooling/warming therapies per order  - Administer medications as ordered  - Instruct and encourage patient and family to use good hand hygiene technique  - Identify and instruct in appropriate isolation precautions for identified infection/condition  Outcome: Progressing  Goal: Absence of  fever/infection during neutropenic period  Description: INTERVENTIONS:  - Monitor WBC    Outcome: Progressing     Problem: DISCHARGE PLANNING  Goal: Discharge to home or other facility with appropriate resources  Description: INTERVENTIONS:  - Identify barriers to discharge w/patient and caregiver  - Arrange for needed discharge resources and transportation as appropriate  - Identify discharge learning needs (meds, wound care, etc.)  - Arrange for interpretive services to assist at discharge as needed  - Refer to Case Management Department for coordinating discharge planning if the patient needs post-hospital services based on physician/advanced practitioner order or complex needs related to functional status, cognitive ability, or social support system  Outcome: Progressing     Problem: Knowledge Deficit  Goal: Patient/family/caregiver demonstrates understanding of disease process, treatment plan, medications, and discharge instructions  Description: Complete learning assessment and assess knowledge base.  Interventions:  - Provide teaching at level of understanding  - Provide teaching via preferred learning methods  Outcome: Progressing

## 2024-02-05 NOTE — PLAN OF CARE
Problem: GENITOURINARY - ADULT  Goal: Maintains or returns to baseline urinary function  Description: INTERVENTIONS:  - Assess urinary function  - Encourage oral fluids to ensure adequate hydration if ordered  - Administer IV fluids as ordered to ensure adequate hydration  - Administer ordered medications as needed  - Offer frequent toileting  - Follow urinary retention protocol if ordered  Outcome: Progressing     Problem: GENITOURINARY - ADULT  Goal: Urinary catheter remains patent  Description: INTERVENTIONS:  - Assess patency of urinary catheter  - If patient has a chronic navas, consider changing catheter if non-functioning  - Follow guidelines for intermittent irrigation of non-functioning urinary catheter  Outcome: Progressing     Problem: GENITOURINARY - ADULT  Goal: Absence of urinary retention  Description: INTERVENTIONS:  - Assess patient’s ability to void and empty bladder  - Monitor I/O  - Bladder scan as needed  - Discuss with physician/AP medications to alleviate retention as needed  - Discuss catheterization for long term situations as appropriate  Outcome: Progressing     Problem: CARDIOVASCULAR - ADULT  Goal: Maintains optimal cardiac output and hemodynamic stability  Description: INTERVENTIONS:  - Monitor I/O, vital signs and rhythm  - Monitor for S/S and trends of decreased cardiac output  - Administer and titrate ordered vasoactive medications to optimize hemodynamic stability  - Assess quality of pulses, skin color and temperature  - Assess for signs of decreased coronary artery perfusion  - Instruct patient to report change in severity of symptoms  Outcome: Progressing     Problem: CARDIOVASCULAR - ADULT  Goal: Absence of cardiac dysrhythmias or at baseline rhythm  Description: INTERVENTIONS:  - Continuous cardiac monitoring, vital signs, obtain 12 lead EKG if ordered  - Administer antiarrhythmic and heart rate control medications as ordered  - Monitor electrolytes and administer replacement  therapy as ordered  Outcome: Progressing

## 2024-02-05 NOTE — PHYSICAL THERAPY NOTE
Physical Therapy Cancellation Note               02/05/24 1125   PT Last Visit   PT Visit Date 02/05/24   Note Type   Note Type Cancelled Session   Cancel Reasons Patient to operating room   Assessment   Assessment PT intervention is cx for this morning as pt is off the floor recieving a cardiac cath. PT will follow and attempt to see pt when PT schedule will allow     Julito Bui

## 2024-02-05 NOTE — ASSESSMENT & PLAN NOTE
Lab Results   Component Value Date    HGB 10.9 (L) 02/05/2024    HGB 11.3 (L) 02/04/2024    HGB 11.2 (L) 02/03/2024    Plan as above

## 2024-02-05 NOTE — ASSESSMENT & PLAN NOTE
Pt noted to be SOB, wheezing, tachypnea. Endorsed chest pain while in OR.  hsTroponin elevated  BNP - 2,737  CXR unremarkable  Cardiac echo 1/29 showed normal EF 65%, and grade 1 diastolic dysfunction with hypokinesis of basal inferoseptal and inferior walls   Concern for Type II MI per cardiology secondary to sepsis/hypoxia, although there is concern for CAD and she will eventually need cath     Plan:  Cardiology consulted, appreciate recommendations  Continue ASA/Plavix.  Subcu heparin for DVT prophylaxis  Possible cardiac cath planned for 2/5/2024  LUC planned 2/5/2024  Continue supplemental oxygen, wean as tolerated

## 2024-02-05 NOTE — PROGRESS NOTES
[facility]  Progress Note  Name: Loida Duarte I  MRN: [id number]  Unit/Bed#: S -01 I Date of Admission: 1/28/2024   Date of Service: 2/5/2024 I Hospital Day: 7    Assessment/Plan   * Severe sepsis (HCC)  Assessment & Plan  As evidenced by initial fever (100.7), tachycardia (110), tachypnea (26), leukocytosis (30), Lactate 3.8  Source: Urosepsis, bacteremia  CT chest/ABD/pelvis - 6mm calculus in the right ureterovesical junction and moderate hydronephrosis. Signs of pyelonephritis  Procalcitonin - 230  Received 500cc resuscitation fluid in ED, not 30cc/kg given concerns for respiratory status as pt noted to be tachyneic, hypoxic and wheezing after receiving IVF  Blood cultures x 2 drawn - positive for E faecalis and E. coli  UA: +leukocytes, +WBC, occasional bacteria, negative nitrites  Received Vancomycin, Cefepime, Cipro in ED  Blood cultures positive for E coli and enterococcus faecalis, urine culture positive for gram negative rods     Plan:  Currently on ampicillin and ceftriaxone for synergy, duration of antibiotic therapy to be determined  LUC to evaluate for endocarditis given bacteremia is planned today.  ID consulted, recommendations appreciated  Follow repeat blood cultures, obtained 2/2/2024.  No growth at 48 hours  Trend WBC, fever curve       Elevated troponin  Assessment & Plan  Pt noted to be SOB, wheezing, tachypnea. Endorsed chest pain while in OR.  hsTroponin elevated  BNP - 2,737  CXR unremarkable  Cardiac echo 1/29 showed normal EF 65%, and grade 1 diastolic dysfunction with hypokinesis of basal inferoseptal and inferior walls   Concern for Type II MI per cardiology secondary to sepsis/hypoxia, although there is concern for CAD and she will eventually need cath     Plan:  Cardiology consulted, appreciate recommendations  Continue ASA/Plavix.  Subcu heparin for DVT prophylaxis  Possible cardiac cath planned for 2/5/2024  LUC planned 2/5/2024  Continue  supplemental oxygen, wean as tolerated    Obstructive pyelonephritis  Assessment & Plan  Patient had right-sided ureterovesical calculus and hydronephrosis  Patient had urology consulted. Cystoscopy ureteroscopy and stent placement completed       Right ureteral calculus  Assessment & Plan  CT chest/ABD/pelvis - 6mm calculus in the right ureterovesical junction and moderate hydronephrosis.  Urology consulted in ED and patient was taken to OR for cysto with stent placement on 1/29/24    Plan:  Continue Abx  Maintain navas catheter  Monitor I&O  Monitor renal function    RAKEL (acute kidney injury) (HCC)  Assessment & Plan  Lab Results   Component Value Date    CREATININE 1.19 02/05/2024    CREATININE 1.27 02/04/2024    CREATININE 1.38 (H) 02/03/2024      Initial sCr - 2.58  Unknown baseline sCr   Likely worsened in setting of severe sepsis, obstructing renal calculus  Received 500cc bolus in ED, additional fluids initially held given respiratory status, elevated BNP and concern for volume overload.     Today creatinine continue to improve    Plan:  Maintain navas catheter  Strict I&O  Avoid nephrotoxins and hypotension  Monitor and replete electrolytes  Repeat BMP daily  Continue with gentle hydration    Acute respiratory insufficiency  Assessment & Plan  Pt presented with tachypnea, increased WOB and audible expiratory wheezing, SpO2 88% on room air.  No known pulmonary history, briefly smoked cigarettes many years ago.  Spo2 improved on 2L NC but wheezes persist. Albuterol x1 given in ED with minimal improvement.   LS diminished with wheeze in upper airway, no stridor  CXR - no acute cardiopulmonary abnormalities.  CT chest - No acute findings, no consolidation, pneumothorax, pleural effusion  BNP - 2,737  Initially thought to be volume overload, but now concern for undiagnosed COPD     Plan:  Continue supplemental oxygen for goal SpO2 > 88%, given likely undiagnosed COPD, currently on 1-2L NC  Suspect component of  undiagnosed sleep apnea, continue 2 L NC qhs, consider CPAP  Atrovent and Xopenex as needed TID  Encourage incentive spirometry       Anemia  Assessment & Plan  Lab Results   Component Value Date    HGB 10.9 (L) 02/05/2024    HGB 11.3 (L) 02/04/2024    HGB 11.2 (L) 02/03/2024    Plan as above    Heme positive stool  Assessment & Plan  Lab Results   Component Value Date    HGB 10.9 (L) 02/05/2024    HGB 11.3 (L) 02/04/2024    HGB 11.2 (L) 02/03/2024      - Patient had black, heme positive stools on AM of 1/31/24 during hospitalization   - GI consulted, appreciate recommendations   - Underwent EGD on 1/31/24 to rule out upper GI bleed, which revealed 2 ulcers (stomach and duodenum) but no active bleeding-negative for H. pylori    Plan:  Continue to monitor  Pantoprazole 4 mg twice daily  Repeat EGD in 3 months as outpatient    Electrolyte abnormality  Assessment & Plan  Lab Results   Component Value Date    CALCIUM 7.3 (L) 02/05/2024    CALCIUM 7.4 (L) 02/04/2024   Calcium low at this point, previously corrected to normal levels.  Giving vitamin D and calcium supplementation  Continue to monitor    Hypertension  Assessment & Plan  - Likely undiagnosed chronic hypertension  - Consistently 190s/80s    Plan:  - Started Amlodipine 5 mg daily   - Labetalol 10 mg or Hydralazine 5 mg PRN for SBP > 180       Thrombocytopenia (HCC)-resolved as of 2/4/2024  Assessment & Plan  Lab Results   Component Value Date     02/05/2024     02/04/2024     (L) 02/03/2024   Platelets improved today    - Continue to trend, resolved  - Likely secondary to sepsis  - Low suspicion for HIT               VTE Pharmacologic Prophylaxis: VTE Score: 8 High Risk (Score >/= 5) - Pharmacological DVT Prophylaxis Ordered: heparin. Sequential Compression Devices Ordered.    Mobility:   Basic Mobility Inpatient Raw Score: 11  -HLM Goal: 4: Move to chair/commode  JH-HLM Achieved: 1: Laying in bed  HLM Goal NOT achieved. Continue with  multidisciplinary rounding and encourage appropriate mobility to improve upon HLM goals.    Patient Centered Rounds: I performed bedside rounds with nursing staff today.  Discussions with Specialists or Other Care Team Provider: Attending physician and senior resident    Education and Discussions with Family / Patient: Updated  (daughter) at bedside.    Current Length of Stay: 7 day(s)  Current Patient Status: Inpatient   Discharge Plan: Anticipate discharge in 48-72 hrs to discharge location to be determined pending rehab evaluations.    Code Status: Level 1 - Full Code    Subjective:   Evaluated patient at bedside, states that she is once again unhappy that she cannot eat and has a dry mouth. Otherwise states that she is unchanged. No experiencing sob, chest pains, palpitations, lightheadedness, dizziness, fevers, chills, abd pains, n/v. Patient believes she is having softer stools at this point, likely secondary to abx course.     Objective:     Vitals:   Temp (24hrs), Av.4 °F (36.9 °C), Min:97.6 °F (36.4 °C), Max:99.4 °F (37.4 °C)    Temp:  [97.6 °F (36.4 °C)-99.4 °F (37.4 °C)] 98.9 °F (37.2 °C)  HR:  [80-92] 88  Resp:  [18-20] 18  BP: (124-169)/(66-83) 162/78  SpO2:  [91 %-96 %] 95 %  Body mass index is 53.89 kg/m².     Input and Output Summary (last 24 hours):     Intake/Output Summary (Last 24 hours) at 2024 1520  Last data filed at 2024 1242  Gross per 24 hour   Intake 390 ml   Output 775 ml   Net -385 ml       Physical Exam:   Physical Exam  Vitals and nursing note reviewed.   Constitutional:       General: She is not in acute distress.     Appearance: She is well-developed. She is obese.   HENT:      Head: Normocephalic and atraumatic.   Eyes:      Conjunctiva/sclera: Conjunctivae normal.   Cardiovascular:      Rate and Rhythm: Normal rate and regular rhythm.      Heart sounds: Murmur heard.   Pulmonary:      Effort: Pulmonary effort is normal. No respiratory distress.      Breath  sounds: Normal breath sounds.      Comments: Auscultated anteriorly due to patient's body habitus  On 3 L, saturating well    Abdominal:      General: Bowel sounds are normal.      Palpations: Abdomen is soft.      Tenderness: There is no abdominal tenderness.   Musculoskeletal:         General: No swelling.   Skin:     General: Skin is warm and dry.   Neurological:      Mental Status: She is alert and oriented to person, place, and time.   Psychiatric:      Comments: Unhappy due to being npo          Additional Data:     Labs:  Results from last 7 days   Lab Units 02/05/24  0411   WBC Thousand/uL 20.68*   HEMOGLOBIN g/dL 10.9*   HEMATOCRIT % 34.3*   PLATELETS Thousands/uL 214   BANDS PCT % 1   LYMPHO PCT % 13*   MONO PCT % 8   EOS PCT % 0     Results from last 7 days   Lab Units 02/05/24  0411   SODIUM mmol/L 138   POTASSIUM mmol/L 4.2   CHLORIDE mmol/L 106   CO2 mmol/L 25   BUN mg/dL 21   CREATININE mg/dL 1.19   ANION GAP mmol/L 7   CALCIUM mg/dL 7.3*   ALBUMIN g/dL 2.5*   TOTAL BILIRUBIN mg/dL 0.36   ALK PHOS U/L 80   ALT U/L 9   AST U/L 15   GLUCOSE RANDOM mg/dL 85     Results from last 7 days   Lab Units 02/05/24  0411   INR  1.70*                   Lines/Drains:  Invasive Devices       Peripheral Intravenous Line  Duration             Peripheral IV 02/01/24 Dorsal (posterior);Left Forearm 3 days    Peripheral IV 02/05/24 Right;Ventral (anterior) Forearm <1 day              Drain  Duration             Urethral Catheter Latex;Double-lumen 18 Fr. 7 days                  Urinary Catheter:  Goal for removal: Voiding trial when ambulation improves           Telemetry:  Telemetry Orders (From admission, onward)               24 Hour Telemetry Monitoring  Continuous x 24 Hours (Telem)        Question:  Reason for 24 Hour Telemetry  Answer:  PCI/EP study (including pacer and ICD implementation), Cardiac surgery, MI, abnormal cardiac cath, and chest pain- rule out MI                     Telemetry Reviewed: Normal Sinus  Rhythm  Indication for Continued Telemetry Use: Acute MI/Unstable Angina/Rule out ACS             Imaging: No pertinent imaging reviewed.    Recent Cultures (last 7 days):   Results from last 7 days   Lab Units 02/02/24  0453 01/30/24  0909 01/30/24  0836   BLOOD CULTURE  No Growth at 72 hrs.  No Growth at 72 hrs. No Growth After 5 Days. Enterococcus faecalis*   GRAM STAIN RESULT   --   --  Gram positive cocci in pairs and chains*       Last 24 Hours Medication List:   Current Facility-Administered Medications   Medication Dose Route Frequency Provider Last Rate    acetaminophen  975 mg Oral Q8H PRN Luiz Calle MD      albuterol  2 puff Inhalation Q4H PRN Frank Reynolds MD      amLODIPine  10 mg Oral Daily Jefe Tenorio MD      ampicillin  2,000 mg Intravenous Q6H Darren Carrasco MD 2,000 mg (02/05/24 0941)    aspirin  81 mg Oral Daily Luiz Calle MD      atorvastatin  80 mg Oral QPM Luiz Calle MD      calcium carbonate-vitamin D  1 tablet Oral Daily With Breakfast John Omer MD      cefTRIAXone  2,000 mg Intravenous Q12H Luiz Calle MD 2,000 mg (02/05/24 0802)    chlorhexidine  15 mL Mouth/Throat Q12H [facility] Luiz Calle MD      clopidogrel  75 mg Oral Daily Luiz Calle MD      diphenhydrAMINE (BENADRYL) 25 mg in sodium chloride 0.9 % 50 mL IVPB  25 mg Intravenous Q6H PRN Luiz Calle MD      EPINEPHrine PF  0.3 mg Intramuscular Once PRN Luiz Calle MD      heparin (porcine)  5,000 Units Subcutaneous Q8H GUNJAN Luiz Calle MD      hydrALAZINE  10 mg Intravenous Q8H PRN Luiz Calle MD      levalbuterol  1.25 mg Nebulization TID PRN John Omer MD      melatonin  6 mg Oral HS Luiz Calle MD      pantoprazole  40 mg Oral BID AC Luiz Calle MD      sodium chloride  2 spray Each Nare Daily Yessica Geiger MD      sodium chloride  50 mL/hr Intravenous Continuous  Jefe Tenorio MD 50 mL/hr (02/05/24 0938)    sodium chloride  50 mL/hr Intravenous Continuous MARLENE Ashby      trimethobenzamide  200 mg Intramuscular Q6H PRN Luiz Calle MD          Today, Patient Was Seen By: John Omer MD    **Please Note: This note may have been constructed using a voice recognition system.**

## 2024-02-05 NOTE — ASSESSMENT & PLAN NOTE
As evidenced by initial fever (100.7), tachycardia (110), tachypnea (26), leukocytosis (30), Lactate 3.8  Source: Urosepsis, bacteremia  CT chest/ABD/pelvis - 6mm calculus in the right ureterovesical junction and moderate hydronephrosis. Signs of pyelonephritis  Procalcitonin - 230  Received 500cc resuscitation fluid in ED, not 30cc/kg given concerns for respiratory status as pt noted to be tachyneic, hypoxic and wheezing after receiving IVF  Blood cultures x 2 drawn - positive for E faecalis and E. coli  UA: +leukocytes, +WBC, occasional bacteria, negative nitrites  Received Vancomycin, Cefepime, Cipro in ED  Blood cultures positive for E coli and enterococcus faecalis, urine culture positive for gram negative rods     Plan:  Currently on ampicillin and ceftriaxone for synergy, duration of antibiotic therapy to be determined  LUC to evaluate for endocarditis given bacteremia is planned today.  ID consulted, recommendations appreciated  Follow repeat blood cultures, obtained 2/2/2024.  No growth at 48 hours  Trend WBC, fever curve

## 2024-02-05 NOTE — PROGRESS NOTES
General Cardiology   Progress Note -  [facility] One   Loida Duarte 78 y.o. female MRN: [id number]    Unit/Bed#: S -01 Encounter: [id number]    Assessment/ Plan:    1.  Elevated troponin: Likely type II MI in the setting of sepsis and hypoxic respiratory failure with new wall motion abnormality seen on echocardiogram.  Status post IV heparin for 48 hours.  Aspirin and Plavix had been held due to possible GI bleed, now resumed post EGD and tolerating with stable Hgb.   Continue on statin.  Echocardiogram revealed normal LV function but with hypokinesis of basal IS and inferior walls.  Reports no chest pain overnight.  For cardiac catheterization today; renal function improved and stable.    2.  Acute hypoxic respiratory failure: Agree with holding off on diuretics due to active sepsis and renal failure.  Currently appears to be euvolemic with exception of mild LE edema.  Continues on 1L nasal cannula oxygen support which is improving.   3.  Urosepsis with right ureteral calculus: Continued on IV antibiotics. ID following  4.  Acute kidney injury: Likely secondary to sepsis and obstructing renal calculus.  Improving levels with IVF, now off IV fluids.  Cr stable 1.19  5.  Dyslipidemia: Continued on statin.  6.  Morbid obesity: With BMI of 52  7.  Bacteremia: Anesthesia deemed the patient too high risk to proceed with LUC last week. Scheduled for procedure today. Respiratory status acceptable today.   8.  Fecal occult positive stool: Likely from gastritis as per EGD. Has been tolerating DAPT with ASA and plavix      Subjective: Pt seen for follow up; no events overnight. Pt reports feeling ok; tired and dry mouth. No chest pain or palpitations. Occasional NP cough but no SOB or orthopnea.     Review of Systems   Constitutional: Positive for malaise/fatigue. Negative for decreased appetite and fever.   Cardiovascular:  Negative for chest pain, dyspnea on exertion, leg swelling, orthopnea and palpitations.  "  Respiratory:  Positive for cough. Negative for shortness of breath, sputum production and wheezing.    Gastrointestinal:  Negative for nausea and vomiting.   Genitourinary:  Negative for dysuria.   Neurological:  Negative for dizziness and light-headedness.   Psychiatric/Behavioral:  Negative for altered mental status.    All other systems reviewed and are negative.    Objective:   Vitals: Blood pressure 169/83, pulse 87, temperature 99.1 °F (37.3 °C), resp. rate 18, height 4' 9.01\" (1.448 m), weight 113 kg (249 lb 1.9 oz), SpO2 92%.,     Body mass index is 53.89 kg/m².,     Systolic (24hrs), Av , Min:134 , Max:169     Diastolic (24hrs), Av, Min:69, Max:83      Intake/Output Summary (Last 24 hours) at 2024 1006  Last data filed at 2024 0237  Gross per 24 hour   Intake 240 ml   Output 775 ml   Net -535 ml     Weight (last 2 days)       Date/Time Weight    24 0600 113 (249.12)    24 0600 113 (249.12)    24 0300 113 (249.34)    24 0600 112 (247.36)          Telemetry Review: No significant arrhythmias seen on telemetry review.   Sinus rhythm, no events    Physical Exam  Vitals reviewed.   Constitutional:       Appearance: Normal appearance. She is obese.      Comments: Pt laying in bed inNAD; alert and cooperative, family at bedside   HENT:      Head: Normocephalic.      Mouth/Throat:      Mouth: Mucous membranes are dry.   Cardiovascular:      Rate and Rhythm: Normal rate and regular rhythm.      Heart sounds: S1 normal and S2 normal. No murmur heard.     Comments: Mild NP LE edema  Pulmonary:      Effort: Pulmonary effort is normal.      Breath sounds: Wheezing present.      Comments: Faint upper lobe exp wheeze; on 1L NC; no resp distress  Abdominal:      Palpations: Abdomen is soft.      Comments: obese   Musculoskeletal:      Right lower leg: Edema present.      Left lower leg: Edema present.   Skin:     General: Skin is warm.   Neurological:      Mental Status: She is " alert and oriented to person, place, and time.   Psychiatric:         Mood and Affect: Mood normal.       LABORATORY RESULTS      CBC with diff:   Results from last 7 days   Lab Units 02/05/24  0411 02/04/24  0458 02/03/24  0523 02/02/24  0503 02/01/24  0502 01/31/24  0508 01/30/24  0615   WBC Thousand/uL 20.68* 16.24* 13.71* 12.13* 15.03* 22.74* 30.35*   HEMOGLOBIN g/dL 10.9* 11.3* 11.2* 10.7* 11.8 12.3 13.5   HEMATOCRIT % 34.3* 35.5 34.6* 32.8* 36.6 38.4 41.0   MCV fL 99* 101* 99* 97 99* 99* 98   PLATELETS Thousands/uL 214 182 126* 99* 86* 92* 103*   RBC Million/uL 3.47* 3.52* 3.51* 3.39* 3.71* 3.88 4.18   MCH pg 31.4 32.1 31.9 31.6 31.8 31.7 32.3   MCHC g/dL 31.8 31.8 32.4 32.6 32.2 32.0 32.9   RDW % 15.0 15.1 14.9 14.7 14.6 14.6 14.5   MPV fL 10.9 12.1 12.2 12.5 13.0* 12.2 12.2     CMP:  Results from last 7 days   Lab Units 02/05/24  0411 02/04/24  0458 02/03/24  0523 02/02/24  0503 02/01/24  0502 01/31/24  0508 01/30/24  1802   POTASSIUM mmol/L 4.2 3.7 4.1 3.9 4.2 4.5 4.6   CHLORIDE mmol/L 106 107 104 105 105 103 101   CO2 mmol/L 25 21 25 22 21 22 20*   BUN mg/dL 21 25 31* 43* 52* 57* 57*   CREATININE mg/dL 1.19 1.27 1.38* 1.56* 1.84* 2.26* 2.49*   CALCIUM mg/dL 7.3* 7.4* 7.2* 7.6* 7.8* 8.5 8.8   AST U/L 15  --  20  --   --   --   --    ALT U/L 9  --  11  --   --   --   --    ALK PHOS U/L 80  --  74  --   --   --   --    EGFR ml/min/1.73sq m 43 39 36 31 25 19 17     BMP:  Results from last 7 days   Lab Units 02/05/24 0411 02/04/24 0458 02/03/24  0523 02/02/24  0503 02/01/24  0502 01/31/24  0508 01/30/24  1802   POTASSIUM mmol/L 4.2 3.7 4.1 3.9 4.2 4.5 4.6   CHLORIDE mmol/L 106 107 104 105 105 103 101   CO2 mmol/L 25 21 25 22 21 22 20*   BUN mg/dL 21 25 31* 43* 52* 57* 57*   CREATININE mg/dL 1.19 1.27 1.38* 1.56* 1.84* 2.26* 2.49*   CALCIUM mg/dL 7.3* 7.4* 7.2* 7.6* 7.8* 8.5 8.8      Results from last 7 days   Lab Units 02/05/24 0411 02/04/24 0458 02/02/24  0503 02/01/24  0502 01/31/24  0508 01/30/24  0615  "  MAGNESIUM mg/dL 2.1 1.9 2.1 2.2 2.2 2.3     Results from last 7 days   Lab Units 02/05/24  0411   INR  1.70*     Lipid Profile:   No results found for: \"CHOL\"  Lab Results   Component Value Date    HDL 37 (L) 01/29/2024     Lab Results   Component Value Date    LDLCALC 123 (H) 01/29/2024     Lab Results   Component Value Date    TRIG 223 (H) 01/29/2024     Meds/Allergies   current meds:   Current Facility-Administered Medications   Medication Dose Route Frequency    acetaminophen (TYLENOL) tablet 975 mg  975 mg Oral Q8H PRN    albuterol (PROVENTIL HFA,VENTOLIN HFA) inhaler 2 puff  2 puff Inhalation Q4H PRN    amLODIPine (NORVASC) tablet 10 mg  10 mg Oral Daily    ampicillin (OMNIPEN) 2,000 mg in sodium chloride 0.9 % 100 mL IVPB  2,000 mg Intravenous Q6H    aspirin (ECOTRIN LOW STRENGTH) EC tablet 81 mg  81 mg Oral Daily    atorvastatin (LIPITOR) tablet 80 mg  80 mg Oral QPM    calcium carbonate-vitamin D 500 mg-5 mcg tablet 1 tablet  1 tablet Oral Daily With Breakfast    cefTRIAXone (ROCEPHIN) 2,000 mg in dextrose 5 % 50 mL IVPB  2,000 mg Intravenous Q12H    chlorhexidine (PERIDEX) 0.12 % oral rinse 15 mL  15 mL Mouth/Throat Q12H GUNJAN    clopidogrel (PLAVIX) tablet 75 mg  75 mg Oral Daily    diphenhydrAMINE (BENADRYL) 25 mg in sodium chloride 0.9 % 50 mL IVPB  25 mg Intravenous Q6H PRN    EPINEPHrine PF (ADRENALIN) 1 mg/mL injection 0.3 mg  0.3 mg Intramuscular Once PRN    heparin (porcine) subcutaneous injection 5,000 Units  5,000 Units Subcutaneous Q8H GUNJAN    hydrALAZINE (APRESOLINE) injection 10 mg  10 mg Intravenous Q8H PRN    levalbuterol (XOPENEX) inhalation solution 1.25 mg  1.25 mg Nebulization TID PRN    melatonin tablet 6 mg  6 mg Oral HS    pantoprazole (PROTONIX) EC tablet 40 mg  40 mg Oral BID AC    sodium chloride (OCEAN) 0.65 % nasal spray 2 spray  2 spray Each Nare Daily    sodium chloride 0.9 % infusion  50 mL/hr Intravenous Continuous    sodium chloride 0.9 % infusion  50 mL/hr Intravenous " Continuous    trimethobenzamide (TIGAN) IM injection 200 mg  200 mg Intramuscular Q6H PRN     No medications prior to admission.     sodium chloride, 50 mL/hr, Last Rate: 50 mL/hr (02/05/24 0938)  sodium chloride, 50 mL/hr      Assessment:  Principal Problem:    Severe sepsis ([facility])  Active Problems:    Right ureteral calculus    Acute respiratory insufficiency    RAKEL (acute kidney injury) ([facility])    Elevated troponin    Obstructive pyelonephritis    Heme positive stool    Hypertension    Anemia    Electrolyte abnormality    Counseling / Coordination of Care  Total floor / unit time spent today 20 minutes.  Greater than 50% of total time was spent with the patient and / or family counseling and / or coordination of care.      ** Please Note: Dragon 360 Dictation voice to text software may have been used in the creation of this document. **

## 2024-02-06 LAB
ANION GAP SERPL CALCULATED.3IONS-SCNC: 8 MMOL/L
BASOPHILS # BLD MANUAL: 0 THOUSAND/UL (ref 0–0.1)
BASOPHILS NFR MAR MANUAL: 0 % (ref 0–1)
BUN SERPL-MCNC: 18 MG/DL (ref 5–25)
CALCIUM SERPL-MCNC: 7.3 MG/DL (ref 8.4–10.2)
CHLORIDE SERPL-SCNC: 107 MMOL/L (ref 96–108)
CO2 SERPL-SCNC: 22 MMOL/L (ref 21–32)
CREAT SERPL-MCNC: 1.13 MG/DL (ref 0.6–1.3)
EOSINOPHIL # BLD MANUAL: 0 THOUSAND/UL (ref 0–0.4)
EOSINOPHIL NFR BLD MANUAL: 0 % (ref 0–6)
ERYTHROCYTE [DISTWIDTH] IN BLOOD BY AUTOMATED COUNT: 14.8 % (ref 11.6–15.1)
GFR SERPL CREATININE-BSD FRML MDRD: 46 ML/MIN/1.73SQ M
GLUCOSE SERPL-MCNC: 79 MG/DL (ref 65–140)
HCT VFR BLD AUTO: 32.7 % (ref 34.8–46.1)
HGB BLD-MCNC: 10.3 G/DL (ref 11.5–15.4)
LYMPHOCYTES # BLD AUTO: 1.18 THOUSAND/UL (ref 0.6–4.47)
LYMPHOCYTES # BLD AUTO: 6 % (ref 14–44)
MCH RBC QN AUTO: 31.4 PG (ref 26.8–34.3)
MCHC RBC AUTO-ENTMCNC: 31.5 G/DL (ref 31.4–37.4)
MCV RBC AUTO: 100 FL (ref 82–98)
METAMYELOCYTES NFR BLD MANUAL: 2 % (ref 0–1)
MONOCYTES # BLD AUTO: 0.99 THOUSAND/UL (ref 0–1.22)
MONOCYTES NFR BLD: 5 % (ref 4–12)
NEUTROPHILS # BLD MANUAL: 17.15 THOUSAND/UL (ref 1.85–7.62)
NEUTS BAND NFR BLD MANUAL: 4 % (ref 0–8)
NEUTS SEG NFR BLD AUTO: 83 % (ref 43–75)
PLATELET # BLD AUTO: 252 THOUSANDS/UL (ref 149–390)
PLATELET BLD QL SMEAR: ADEQUATE
PMV BLD AUTO: 10.6 FL (ref 8.9–12.7)
POTASSIUM SERPL-SCNC: 4.1 MMOL/L (ref 3.5–5.3)
RBC # BLD AUTO: 3.28 MILLION/UL (ref 3.81–5.12)
RBC MORPH BLD: NORMAL
SODIUM SERPL-SCNC: 137 MMOL/L (ref 135–147)
WBC # BLD AUTO: 19.71 THOUSAND/UL (ref 4.31–10.16)

## 2024-02-06 PROCEDURE — 94760 N-INVAS EAR/PLS OXIMETRY 1: CPT

## 2024-02-06 PROCEDURE — 85007 BL SMEAR W/DIFF WBC COUNT: CPT | Performed by: INTERNAL MEDICINE

## 2024-02-06 PROCEDURE — 99232 SBSQ HOSP IP/OBS MODERATE 35: CPT | Performed by: INTERNAL MEDICINE

## 2024-02-06 PROCEDURE — 97530 THERAPEUTIC ACTIVITIES: CPT

## 2024-02-06 PROCEDURE — 93005 ELECTROCARDIOGRAM TRACING: CPT

## 2024-02-06 PROCEDURE — 85027 COMPLETE CBC AUTOMATED: CPT | Performed by: INTERNAL MEDICINE

## 2024-02-06 PROCEDURE — 80048 BASIC METABOLIC PNL TOTAL CA: CPT | Performed by: INTERNAL MEDICINE

## 2024-02-06 RX ADMIN — HEPARIN SODIUM 5000 UNITS: 5000 INJECTION INTRAVENOUS; SUBCUTANEOUS at 05:25

## 2024-02-06 RX ADMIN — ASPIRIN 81 MG: 81 TABLET, COATED ORAL at 08:25

## 2024-02-06 RX ADMIN — CHLORHEXIDINE GLUCONATE 15 ML: 1.2 SOLUTION ORAL at 21:08

## 2024-02-06 RX ADMIN — PANTOPRAZOLE SODIUM 40 MG: 40 TABLET, DELAYED RELEASE ORAL at 08:27

## 2024-02-06 RX ADMIN — CEFTRIAXONE 2000 MG: 2 INJECTION, POWDER, FOR SOLUTION INTRAMUSCULAR; INTRAVENOUS at 08:25

## 2024-02-06 RX ADMIN — AMPICILLIN SODIUM 2000 MG: 2 INJECTION, POWDER, FOR SOLUTION INTRAVENOUS at 04:37

## 2024-02-06 RX ADMIN — SALINE NASAL SPRAY 2 SPRAY: 1.5 SOLUTION NASAL at 08:26

## 2024-02-06 RX ADMIN — AMPICILLIN SODIUM 2000 MG: 2 INJECTION, POWDER, FOR SOLUTION INTRAVENOUS at 10:26

## 2024-02-06 RX ADMIN — HEPARIN SODIUM 5000 UNITS: 5000 INJECTION INTRAVENOUS; SUBCUTANEOUS at 21:08

## 2024-02-06 RX ADMIN — PANTOPRAZOLE SODIUM 40 MG: 40 TABLET, DELAYED RELEASE ORAL at 15:40

## 2024-02-06 RX ADMIN — AMPICILLIN SODIUM 2000 MG: 2 INJECTION, POWDER, FOR SOLUTION INTRAVENOUS at 15:40

## 2024-02-06 RX ADMIN — AMLODIPINE BESYLATE 10 MG: 10 TABLET ORAL at 08:25

## 2024-02-06 RX ADMIN — HEPARIN SODIUM 5000 UNITS: 5000 INJECTION INTRAVENOUS; SUBCUTANEOUS at 14:05

## 2024-02-06 RX ADMIN — Medication 6 MG: at 21:08

## 2024-02-06 RX ADMIN — Medication 1 TABLET: at 08:25

## 2024-02-06 RX ADMIN — AMPICILLIN SODIUM 2000 MG: 2 INJECTION, POWDER, FOR SOLUTION INTRAVENOUS at 22:19

## 2024-02-06 RX ADMIN — ATORVASTATIN CALCIUM 80 MG: 40 TABLET, FILM COATED ORAL at 17:01

## 2024-02-06 RX ADMIN — CLOPIDOGREL BISULFATE 75 MG: 75 TABLET ORAL at 08:25

## 2024-02-06 RX ADMIN — CEFTRIAXONE 2000 MG: 2 INJECTION, POWDER, FOR SOLUTION INTRAMUSCULAR; INTRAVENOUS at 21:08

## 2024-02-06 NOTE — ASSESSMENT & PLAN NOTE
As evidenced by initial fever (100.7), tachycardia (110), tachypnea (26), leukocytosis (30), Lactate 3.8  Source: Urosepsis, bacteremia  CT chest/ABD/pelvis - 6mm calculus in the right ureterovesical junction and moderate hydronephrosis. Signs of pyelonephritis  Procalcitonin - 230  Received 500cc resuscitation fluid in ED, not 30cc/kg given concerns for respiratory status as pt noted to be tachyneic, hypoxic and wheezing after receiving IVF  Blood cultures x 2 drawn - positive for E faecalis and E. coli  UA: +leukocytes, +WBC, occasional bacteria, negative nitrites  Received Vancomycin, Cefepime, Cipro in ED  Blood cultures positive for E coli and enterococcus faecalis, urine culture positive for gram negative rods     Plan:  Currently on ampicillin and ceftriaxone, duration of antibiotic therapy to be determined  Follow-up LUC results  ID consulted, recommendations appreciated  Follow repeat blood cultures, obtained 2/2/2024.  No growth at 72 hours  Trend WBC, fever curve

## 2024-02-06 NOTE — PROGRESS NOTES
Intubation Note    Called to bedside secondary to  respiratory failure. Start:  8562  End:   1259     Patient pre-oxygenated with 100% oxygen. Smooth modified RSI with Propofol mg IV. DVL x 1 using Glide Scope. 7.5 ETT taped and secured at 22 cm at the teeth.    + Bilateral BS, + Chest rise, + ETCO2    CXR pending.     Care turned over to covering Attending MD. NEPHROLOGY HOSPITAL PROGRESS NOTE   Loida Duarte 78 y.o. female MRN: [id number]  Unit/Bed#: S -01 Encounter: [id number]  Reason for Consult: RAKEL    ASSESSMENT and PLAN:  Acute kidney injury (POA):  Admission creatinine 2.58 on January 28, 2024.  Peak creatinine 2.89 on January 30, 2024.  Etiology felt to be due to prerenal azotemia + ATN from bacteremia + obstructive uropathy (R hydronephrosis).   RAKEL resolved.  Creatinine down to 1.13 today.    Hypertension  BP elevated.  Current medications: Amlodipine 10 mg daily  Consider starting Metoprolol given abnormal cardiac biomarkers.     Sepsis due to polymicrobial bacteremia  ID following.   On Ampicillin and Ceftriaxone.   For LUC.     UTI, pyelonephritis    Right distal ureteral calculus with R hydronephrosis  S/p cystoscopy and R ureteral stent on 1/29/24.     Elevated troponins  Was initially planned for cardiac cath which has been placed on hod.   Cardiology following.     Respiratory failure  Defer to SLIM.     DISPOSITION:  Stable renal function.   Since plan for cardiac cath has been placed on hold, will remove order for IVF.     SUBJECTIVE / 24H INTERVAL HISTORY:  Reports some cough but feeling better.   No CP or SOB.     OBJECTIVE:  Current Weight: Weight - Scale: 113 kg (249 lb 1.9 oz)  Vitals:    02/05/24 2336 02/06/24 0600 02/06/24 0715 02/06/24 0717   BP: 168/78   161/86   BP Location:       Pulse:       Resp: 18   (!) 24   Temp: 99.8 °F (37.7 °C)   98.3 °F (36.8 °C)   TempSrc:       SpO2:   94%    Weight:  113 kg (249 lb 1.9 oz)     Height:           Intake/Output Summary (Last 24 hours) at 2/6/2024 0944  Last data filed at 2/5/2024 2020  Gross per 24 hour   Intake 390 ml   Output 900 ml   Net -510 ml     General: conscious, cooperative, no distress  Skin: dry  Eyes: pink conjunctivae  ENT: moist mucous membranes  Respiratory: equal chest expansion, decreased in bases.   Cardiovascular: distinct heart sounds, normal rate, regular rhythm, no  rub  Abdomen: soft, non tender, non distended, normal bowel sounds  Extremities: trace LE edema.   Genitourinary: + navas catheter.   Neuro: awake, alert.   Psych: appropriate affect    Medications:    Current Facility-Administered Medications:     acetaminophen (TYLENOL) tablet 975 mg, 975 mg, Oral, Q8H PRN, Luiz Calle MD, 975 mg at 02/05/24 2237    albuterol (PROVENTIL HFA,VENTOLIN HFA) inhaler 2 puff, 2 puff, Inhalation, Q4H PRN, Frank Reynolds MD, 2 puff at 02/05/24 0724    amLODIPine (NORVASC) tablet 10 mg, 10 mg, Oral, Daily, Jefe Tenorio MD, 10 mg at 02/06/24 0825    ampicillin (OMNIPEN) 2,000 mg in sodium chloride 0.9 % 100 mL IVPB, 2,000 mg, Intravenous, Q6H, Darren Carrasco MD, Last Rate: 200 mL/hr at 02/06/24 0437, 2,000 mg at 02/06/24 0437    aspirin (ECOTRIN LOW STRENGTH) EC tablet 81 mg, 81 mg, Oral, Daily, Luiz Calle MD, 81 mg at 02/06/24 0825    atorvastatin (LIPITOR) tablet 80 mg, 80 mg, Oral, QPM, Luiz Calle MD, 80 mg at 02/05/24 1717    calcium carbonate-vitamin D 500 mg-5 mcg tablet 1 tablet, 1 tablet, Oral, Daily With Breakfast, John Omer MD, 1 tablet at 02/06/24 0825    cefTRIAXone (ROCEPHIN) 2,000 mg in dextrose 5 % 50 mL IVPB, 2,000 mg, Intravenous, Q12H, Luiz Calle MD, Last Rate: 100 mL/hr at 02/06/24 0825, 2,000 mg at 02/06/24 0825    chlorhexidine (PERIDEX) 0.12 % oral rinse 15 mL, 15 mL, Mouth/Throat, Q12H GUNJAN, Luiz Calle MD, 15 mL at 02/04/24 2131    clopidogrel (PLAVIX) tablet 75 mg, 75 mg, Oral, Daily, Luiz Calle MD, 75 mg at 02/06/24 0825    diphenhydrAMINE (BENADRYL) 25 mg in sodium chloride 0.9 % 50 mL IVPB, 25 mg, Intravenous, Q6H PRN, Luiz Calle MD    diphenhydramine, lidocaine, Al/Mg hydroxide, simethicone (Magic Mouthwash) oral solution 10 mL, 10 mL, Swish & Spit, Q6H PRN, Corey Jo DO, 10 mL at 02/05/24 1914    EPINEPHrine PF (ADRENALIN) 1 mg/mL injection 0.3 mg, 0.3 mg,  Intramuscular, Once PRN, Luiz Calle MD    heparin (porcine) subcutaneous injection 5,000 Units, 5,000 Units, Subcutaneous, Q8H GUNJAN, Luiz Calle MD, 5,000 Units at 02/06/24 0525    hydrALAZINE (APRESOLINE) injection 10 mg, 10 mg, Intravenous, Q8H PRN, Luiz Calle MD, 10 mg at 02/04/24 0807    levalbuterol (XOPENEX) inhalation solution 1.25 mg, 1.25 mg, Nebulization, TID PRN, John Omer MD    melatonin tablet 6 mg, 6 mg, Oral, HS, Luiz Calle MD, 6 mg at 02/05/24 2237    pantoprazole (PROTONIX) EC tablet 40 mg, 40 mg, Oral, BID AC, Luiz Calle MD, 40 mg at 02/06/24 0827    sodium chloride (OCEAN) 0.65 % nasal spray 2 spray, 2 spray, Each Nare, Daily, Yessica Geiger MD, 2 spray at 02/06/24 0826    sodium chloride 0.9 % infusion, 50 mL/hr, Intravenous, Continuous, Jefe Tenorio MD, Last Rate: 50 mL/hr at 02/05/24 0938, 50 mL/hr at 02/05/24 0938    sodium chloride 0.9 % infusion, 50 mL/hr, Intravenous, Continuous, MARLENE Ashby    trimethobenzamide (TIGAN) IM injection 200 mg, 200 mg, Intramuscular, Q6H PRN, Luiz Calle MD, 200 mg at 01/30/24 1958    Laboratory Results:  Results from last 7 days   Lab Units 02/06/24  0517 02/05/24  0411 02/04/24  0458 02/03/24  0523 02/02/24  0503 02/01/24  0502 01/31/24  0508   WBC Thousand/uL 19.71* 20.68* 16.24* 13.71* 12.13* 15.03* 22.74*   HEMOGLOBIN g/dL 10.3* 10.9* 11.3* 11.2* 10.7* 11.8 12.3   HEMATOCRIT % 32.7* 34.3* 35.5 34.6* 32.8* 36.6 38.4   PLATELETS Thousands/uL 252 214 182 126* 99* 86* 92*   POTASSIUM mmol/L 4.1 4.2 3.7 4.1 3.9 4.2 4.5   CHLORIDE mmol/L 107 106 107 104 105 105 103   CO2 mmol/L 22 25 21 25 22 21 22   BUN mg/dL 18 21 25 31* 43* 52* 57*   CREATININE mg/dL 1.13 1.19 1.27 1.38* 1.56* 1.84* 2.26*   CALCIUM mg/dL 7.3* 7.3* 7.4* 7.2* 7.6* 7.8* 8.5   MAGNESIUM mg/dL  --  2.1 1.9  --  2.1 2.2 2.2   PHOSPHORUS mg/dL  --   --   --   --  2.7 2.8 3.4

## 2024-02-06 NOTE — ASSESSMENT & PLAN NOTE
Pt presented with tachypnea, increased WOB and audible expiratory wheezing, SpO2 88% on room air.  No known pulmonary history, briefly smoked cigarettes many years ago.  Spo2 improved on 2L NC but wheezes persist. Albuterol x1 given in ED with minimal improvement.   LS diminished with wheeze in upper airway, no stridor  CXR - no acute cardiopulmonary abnormalities.  CT chest - No acute findings, no consolidation, pneumothorax, pleural effusion  BNP - 2,737  Initially thought to be volume overload, but now concern for undiagnosed COPD     Plan:  Continue supplemental oxygen for goal SpO2 > 88%, given likely undiagnosed COPD, currently on 1-2L NC, wean as needed  Suspect component of undiagnosed sleep apnea, continue 2 L NC qhs, consider CPAP  Atrovent and Xopenex as needed TID  Encourage incentive spirometry

## 2024-02-06 NOTE — ASSESSMENT & PLAN NOTE
Pt noted to be SOB, wheezing, tachypnea. Endorsed chest pain while in OR.  hsTroponin elevated  BNP - 2,737  CXR unremarkable  Cardiac echo 1/29 showed normal EF 65%, and grade 1 diastolic dysfunction with hypokinesis of basal inferoseptal and inferior walls   Concern for Type II MI per cardiology secondary to sepsis/hypoxia, although there is concern for CAD and she will eventually need cath     Plan:  Cardiology consulted, appreciate recommendations  Continue ASA/Plavix.  Subcu heparin for DVT prophylaxis  Tentative cardiac cath as inpatient  Continue supplemental oxygen, wean as tolerated

## 2024-02-06 NOTE — OCCUPATIONAL THERAPY NOTE
Occupational Therapy Attempt Note       02/06/24 1432   Note Type   Note Type Cancelled Session   Cancel Reasons Refusal  (Attempted OT treatment this PM however patient declined due to fatigue; Will follow up as patient is agreeable and as schedule permits)     Kathleen Dorantes, OTR/L

## 2024-02-06 NOTE — CASE MANAGEMENT
"   Case Management Progress Note    Patient name Loida Duarte  Location S /S -01 MRN [id number]  : 1945 Date 2024       LOS (days): 8  Geometric Mean LOS (GMLOS) (days): 9.9  Days to GMLOS:1.4        OBJECTIVE:        Current admission status: Inpatient  Preferred Pharmacy: No Pharmacies Listed  Primary Care Provider: No primary care provider on file.    Primary Insurance: MEDICARE  Secondary Insurance: [employer] PENDING    PROGRESS NOTE:      Patient was reviewed in care coordination rounds today - As per provider, patient will likely remain inpatient for an additional \"few days\" secondary to IV antibiotics plan, awaiting a PICC line. Plan remains for patient to admit to [place] for STR Upon medical stability -- Update given to their team via [facility] for their records. CM will continue to monitor.     "

## 2024-02-06 NOTE — PROGRESS NOTES
[facility]  Progress Note  Name: Loida Duarte I  MRN: [id number]  Unit/Bed#: S -01 I Date of Admission: 1/28/2024   Date of Service: 2/6/2024 I Hospital Day: 8    Assessment/Plan   * Severe sepsis (HCC)  Assessment & Plan  As evidenced by initial fever (100.7), tachycardia (110), tachypnea (26), leukocytosis (30), Lactate 3.8  Source: Urosepsis, bacteremia  CT chest/ABD/pelvis - 6mm calculus in the right ureterovesical junction and moderate hydronephrosis. Signs of pyelonephritis  Procalcitonin - 230  Received 500cc resuscitation fluid in ED, not 30cc/kg given concerns for respiratory status as pt noted to be tachyneic, hypoxic and wheezing after receiving IVF  Blood cultures x 2 drawn - positive for E faecalis and E. coli  UA: +leukocytes, +WBC, occasional bacteria, negative nitrites  Received Vancomycin, Cefepime, Cipro in ED  Blood cultures positive for E coli and enterococcus faecalis, urine culture positive for gram negative rods     Plan:  Currently on ampicillin and ceftriaxone, duration of antibiotic therapy to be determined  Follow-up LUC results  ID consulted, recommendations appreciated  Follow repeat blood cultures, obtained 2/2/2024.  No growth at 72 hours  Trend WBC, fever curve       Elevated troponin  Assessment & Plan  Pt noted to be SOB, wheezing, tachypnea. Endorsed chest pain while in OR.  hsTroponin elevated  BNP - 2,737  CXR unremarkable  Cardiac echo 1/29 showed normal EF 65%, and grade 1 diastolic dysfunction with hypokinesis of basal inferoseptal and inferior walls   Concern for Type II MI per cardiology secondary to sepsis/hypoxia, although there is concern for CAD and she will eventually need cath     Plan:  Cardiology consulted, appreciate recommendations  Continue ASA/Plavix.  Subcu heparin for DVT prophylaxis  Tentative cardiac cath as inpatient  Continue supplemental oxygen, wean as tolerated    Obstructive pyelonephritis  Assessment & Plan  Patient  had right-sided ureterovesical calculus and hydronephrosis  Patient had urology consulted. Cystoscopy ureteroscopy and stent placement completed       Right ureteral calculus  Assessment & Plan  CT chest/ABD/pelvis - 6mm calculus in the right ureterovesical junction and moderate hydronephrosis.  Urology consulted in ED and patient was taken to OR for cysto with stent placement on 1/29/24    Plan:  Continue Abx  Maintain navas catheter  Monitor I&O  Monitor renal function    RAKEL (acute kidney injury) (HCC)  Assessment & Plan  Lab Results   Component Value Date    CREATININE 1.13 02/06/2024    CREATININE 1.19 02/05/2024    CREATININE 1.27 02/04/2024      Initial sCr - 2.58  Unknown baseline sCr   Likely worsened in setting of severe sepsis, obstructing renal calculus  Received 500cc bolus in ED, additional fluids initially held given respiratory status, elevated BNP and concern for volume overload.     Today creatinine continue to improve    Plan:  Maintain navas catheter  Strict I&O  Avoid nephrotoxins and hypotension  Monitor and replete electrolytes  Repeat BMP daily  Continue with gentle hydration    Acute respiratory insufficiency  Assessment & Plan  Pt presented with tachypnea, increased WOB and audible expiratory wheezing, SpO2 88% on room air.  No known pulmonary history, briefly smoked cigarettes many years ago.  Spo2 improved on 2L NC but wheezes persist. Albuterol x1 given in ED with minimal improvement.   LS diminished with wheeze in upper airway, no stridor  CXR - no acute cardiopulmonary abnormalities.  CT chest - No acute findings, no consolidation, pneumothorax, pleural effusion  BNP - 2,737  Initially thought to be volume overload, but now concern for undiagnosed COPD     Plan:  Continue supplemental oxygen for goal SpO2 > 88%, given likely undiagnosed COPD, currently on 1-2L NC, wean as needed  Suspect component of undiagnosed sleep apnea, continue 2 L NC qhs, consider CPAP  Atrovent and Xopenex as  needed TID  Encourage incentive spirometry       Anemia  Assessment & Plan  Lab Results   Component Value Date    HGB 10.3 (L) 02/06/2024    HGB 10.9 (L) 02/05/2024    HGB 11.3 (L) 02/04/2024    Plan as above    Heme positive stool  Assessment & Plan  Lab Results   Component Value Date    HGB 10.3 (L) 02/06/2024    HGB 10.9 (L) 02/05/2024    HGB 11.3 (L) 02/04/2024      - Patient had black, heme positive stools on AM of 1/31/24 during hospitalization   - GI consulted, appreciate recommendations   - Underwent EGD on 1/31/24 to rule out upper GI bleed, which revealed 2 ulcers (stomach and duodenum) but no active bleeding-negative for H. pylori    Plan:  Continue to monitor  Pantoprazole 4 mg twice daily  Repeat EGD in 3 months as outpatient    Electrolyte abnormality  Assessment & Plan  Lab Results   Component Value Date    CALCIUM 7.3 (L) 02/06/2024    CALCIUM 7.3 (L) 02/05/2024   Calcium low at this point, previously corrected to normal levels.  Giving vitamin D and calcium supplementation  Continue to monitor    Hypertension  Assessment & Plan  - Likely undiagnosed chronic hypertension  - Consistently 190s/80s    Plan:  - Started Amlodipine 5 mg daily   - Labetalol 10 mg or Hydralazine 5 mg PRN for SBP > 180       Thrombocytopenia (HCC)-resolved as of 2/4/2024  Assessment & Plan  Lab Results   Component Value Date     02/06/2024     02/05/2024     02/04/2024   Platelets improved today    - Continue to trend, resolved  - Likely secondary to sepsis  - Low suspicion for HIT               VTE Pharmacologic Prophylaxis: VTE Score: 8 High Risk (Score >/= 5) - Pharmacological DVT Prophylaxis Ordered: heparin. Sequential Compression Devices Ordered.    Mobility:   Basic Mobility Inpatient Raw Score: 10  JH-HLM Goal: 4: Move to chair/commode  JH-HLM Achieved: 4: Move to chair/commode  HLM Goal achieved. Continue to encourage appropriate mobility.    Patient Centered Rounds: I performed bedside rounds  with nursing staff today.  Discussions with Specialists or Other Care Team Provider: Attending physician, senior resident    Education and Discussions with Family / Patient: Updated  (daughter) at bedside.    Current Length of Stay: 8 day(s)  Current Patient Status: Inpatient   Discharge Plan: Anticipate discharge in >72 hrs to discharge location to be determined pending rehab evaluations.    Code Status: Level 1 - Full Code    Subjective:   Evaluated patient at bedside this morning, states that she is doing much better than yesterday, has no throat pain and is eating well.  States that she is breathing well and has not been using the nasal cannula secondary to it continuously falling off her face, continues to sat well.  Patient states that she has not had any significant change in her bowel movements and does not believe that her diarrhea is worsening or liquidy.  Denying any shortness of breath, chest pain, palpitations, cough, abdominal pains, vomiting, nausea.    Objective:     Vitals:   Temp (24hrs), Av.1 °F (37.3 °C), Min:98.3 °F (36.8 °C), Max:99.8 °F (37.7 °C)    Temp:  [98.3 °F (36.8 °C)-99.8 °F (37.7 °C)] 98.3 °F (36.8 °C)  HR:  [92] 92  Resp:  [18-24] 19  BP: (161-169)/(72-86) 169/72  SpO2:  [94 %-96 %] 96 %  Body mass index is 53.89 kg/m².     Input and Output Summary (last 24 hours):     Intake/Output Summary (Last 24 hours) at 2024 1711  Last data filed at 2024 2020  Gross per 24 hour   Intake 240 ml   Output 900 ml   Net -660 ml       Physical Exam:   Physical Exam  Vitals and nursing note reviewed.   Constitutional:       General: She is not in acute distress.     Appearance: She is well-developed. She is obese.   HENT:      Head: Normocephalic and atraumatic.   Eyes:      Conjunctiva/sclera: Conjunctivae normal.   Cardiovascular:      Rate and Rhythm: Normal rate and regular rhythm.      Heart sounds: Murmur heard.   Pulmonary:      Effort: Pulmonary effort is normal. No  respiratory distress.      Breath sounds: Normal breath sounds.      Comments: Auscultated anteriorly due to patient's body habitus      Abdominal:      General: Bowel sounds are normal.      Palpations: Abdomen is soft.      Tenderness: There is no abdominal tenderness.   Musculoskeletal:         General: No swelling.   Skin:     General: Skin is warm and dry.      Findings: Bruising (arms, healing) present.   Neurological:      Mental Status: She is alert and oriented to person, place, and time.   Psychiatric:         Mood and Affect: Mood normal.          Additional Data:     Labs:  Results from last 7 days   Lab Units 24  0517   WBC Thousand/uL 19.71*   HEMOGLOBIN g/dL 10.3*   HEMATOCRIT % 32.7*   PLATELETS Thousands/uL 252   BANDS PCT % 4   LYMPHO PCT % 6*   MONO PCT % 5   EOS PCT % 0     Results from last 7 days   Lab Units 24  0517 24  0411   SODIUM mmol/L 137 138   POTASSIUM mmol/L 4.1 4.2   CHLORIDE mmol/L 107 106   CO2 mmol/L 22 25   BUN mg/dL 18 21   CREATININE mg/dL 1.13 1.19   ANION GAP mmol/L 8 7   CALCIUM mg/dL 7.3* 7.3*   ALBUMIN g/dL  --  2.5*   TOTAL BILIRUBIN mg/dL  --  0.36   ALK PHOS U/L  --  80   ALT U/L  --  9   AST U/L  --  15   GLUCOSE RANDOM mg/dL 79 85     Results from last 7 days   Lab Units 24  0411   INR  1.70*                   Lines/Drains:  Invasive Devices       Peripheral Intravenous Line  Duration             Peripheral IV 24 Left;Proximal;Ventral (anterior) Forearm <1 day              Drain  Duration             Urethral Catheter Latex;Double-lumen 18 Fr. 8 days                  Urinary Catheter:  Goal for removal: Voiding trial when ambulation improves           Telemetry:  Telemetry Orders (From admission, onward)               24 Hour Telemetry Monitoring  Continuous x 24 Hours (Telem)           Question:  Reason for 24 Hour Telemetry  Answer:  PCI/EP study (including pacer and ICD implementation), Cardiac surgery, MI, abnormal cardiac  cath, and chest pain- rule out MI                     Telemetry Reviewed: Normal Sinus Rhythm  Indication for Continued Telemetry Use: Acute MI/Unstable Angina/Rule out ACS             Imaging: No pertinent imaging reviewed.  Awaiting LUC results    Recent Cultures (last 7 days):   Results from last 7 days   Lab Units 02/02/24  0453   BLOOD CULTURE  No Growth After 4 Days.  No Growth After 4 Days.       Last 24 Hours Medication List:   Current Facility-Administered Medications   Medication Dose Route Frequency Provider Last Rate    acetaminophen  975 mg Oral Q8H PRN Luiz Calle MD      albuterol  2 puff Inhalation Q4H PRN Frank Reynolds MD      amLODIPine  10 mg Oral Daily Jefe Tenorio MD      ampicillin  2,000 mg Intravenous Q6H Darren Carrasco MD 2,000 mg (02/06/24 1540)    aspirin  81 mg Oral Daily Luiz Calle MD      atorvastatin  80 mg Oral QPM Luiz Calle MD      calcium carbonate-vitamin D  1 tablet Oral Daily With Breakfast John Omer MD      cefTRIAXone  2,000 mg Intravenous Q12H Luiz Calle MD 2,000 mg (02/06/24 0825)    chlorhexidine  15 mL Mouth/Throat Q12H GUNJAN Luiz Calle MD      clopidogrel  75 mg Oral Daily Luiz Calle MD      diphenhydrAMINE (BENADRYL) 25 mg in sodium chloride 0.9 % 50 mL IVPB  25 mg Intravenous Q6H PRN Luiz Calle MD      diphenhydramine, lidocaine, Al/Mg hydroxide, simethicone  10 mL Swish & Spit Q6H PRN Corey Jo DO      EPINEPHrine PF  0.3 mg Intramuscular Once PRN Luiz Calle MD      heparin (porcine)  5,000 Units Subcutaneous Q8H GUNJAN Luiz Calle MD      hydrALAZINE  10 mg Intravenous Q8H PRN Luiz Calle MD      levalbuterol  1.25 mg Nebulization TID PRN John Omer MD      melatonin  6 mg Oral HS Luiz Calle MD      pantoprazole  40 mg Oral BID AC Luiz Calle MD      sodium chloride  2 spray Each Nare Daily Yessica  Felipe Geiger MD      trimethobenzamide  200 mg Intramuscular Q6H PRN Luiz Calle MD          Today, Patient Was Seen By: John Omer MD    **Please Note: This note may have been constructed using a voice recognition system.**

## 2024-02-06 NOTE — PHYSICAL THERAPY NOTE
PHYSICAL THERAPY NOTE          Patient Name: Loida Duarte  Today's Date: 2/6/2024 02/06/24 1130   PT Last Visit   PT Visit Date 02/06/24   Note Type   Note Type Treatment   Pain Assessment   Pain Assessment Tool 0-10   Pain Score No Pain   Pain Location/Orientation Location: Generalized  (weakness)   Effect of Pain on Daily Activities limited activity tolerance and OOB mobility   Patient's Stated Pain Goal No pain   Hospital Pain Intervention(s) Repositioned;Ambulation/increased activity;Emotional support;Rest   Multiple Pain Sites No   Pain Rating: FLACC (Rest) - Face 0   Pain Rating: FLACC (Rest) - Legs 0   Pain Rating: FLACC (Rest) - Activity 0   Pain Rating: FLACC (Rest) - Cry 0   Pain Rating: FLACC (Rest) - Consolability 0   Score: FLACC (Rest) 0   Pain Rating: FLACC (Activity) - Face 1   Pain Rating: FLACC (Activity) - Legs 1   Pain Rating: FLACC (Activity) - Activity 1   Pain Rating: FLACC (Activity) - Cry 1   Pain Rating: FLACC (Activity) - Consolability 1   Score: FLACC (Activity) 5   Restrictions/Precautions   Weight Bearing Precautions Per Order No   Other Precautions Chair Alarm;Bed Alarm;Telemetry;Fall Risk;Pain  (room air in todays tx session)   General   Chart Reviewed Yes   Response to Previous Treatment Patient with no complaints from previous session.   Family/Caregiver Present Yes  (daughters)   Cognition   Overall Cognitive Status Impaired   Arousal/Participation Alert;Responsive;Cooperative  (cooperative at times as pt did require several VC's to redirect for increased safety in todays tx session)   Attention Attends with cues to redirect   Orientation Level Oriented to person;Oriented to place;Oriented to situation;Disoriented to time   Memory Decreased short term memory;Decreased recall of recent events;Decreased recall of precautions   Following Commands Follows one step commands inconsistently    Comments pt identified b y name and  on wrist band   Subjective   Subjective pt was agreeable to participate in PT intervention and stated 0/10 pain pre/post tx session   Bed Mobility   Supine to Sit 2  Maximal assistance   Additional items Assist x 1;HOB elevated;Bedrails;Increased time required;Verbal cues;LE management;Other  (trunk maangement)   Sit to Supine 2  Maximal assistance   Additional items Assist x 2;HOB elevated;Bedrails;Increased time required;Verbal cues;LE management;Other  (trunk management)   Additional Comments pt was able to sit EOB with initially min Ax1 but progressed to close /s and no LOB >10 minutes   Transfers   Sit to Stand 3  Moderate assistance   Additional items Assist x 2;Increased time required;Verbal cues   Stand to Sit 3  Moderate assistance   Additional items Assist x 2;Armrests;Increased time required;Verbal cues  (w/ RW)   Stand pivot 3  Moderate assistance   Additional items Assist x 2;Armrests;Increased time required;Verbal cues  (w/ RW)   Additional Comments pt required an increase in assistance  for all functional transfers in todays tx session compared to previous tx session with RW mod ax2 , VC's for hand placement, RW management and upright posture   Ambulation/Elevation   Gait pattern Not tested;Not appropriate   Ambulation/Elevation Additional Comments pt limited with standing tolerance/balance. pt unable to obtain upright posture as pt required mod ax1 in order to complete functional transfers in [facility] tx session   Balance   Static Sitting Fair +   Dynamic Sitting Fair   Static Standing Poor  (poor x2)   Dynamic Standing Poor -   Ambulatory Zero  (pt with limited functional mobility, standing tolerance/balance)   Endurance Deficit   Endurance Deficit Yes   Endurance Deficit Description limited activity tolerance, bed mobility, functional transfers , standing tolerance/balance   Activity Tolerance   Activity Tolerance Patient limited by fatigue;Other  (Comment)  (weakness)   Nurse Made Aware Spoke to RN Matilda   Assessment   Prognosis Fair   Problem List Decreased range of motion;Decreased strength;Impaired balance;Decreased endurance;Decreased mobility;Impaired judgement;Decreased safety awareness   Assessment pt began tx session lying supine in the bed and was agreeable to participate in PT intervention. Pt intervention to focus on bed mobility, transfer training, OOB mobility and increasing activity/standing tolerance. pt overall mobility status has declined from previous PT intervention w/ bed mobility, sitting balance, functional transfers, standing tolerance and all OOB mobility. pt required max ax1 w/ LE and trunk management in order to sit EOB from supine but required max Ax2 to return to supine position. pt continues to require assistance for static sitting EOB as pt initially was min ax1 due to retropulsion but was able to progress to close /s. pt limited w/ functional transfers in todays tx session as pt required mod ax1 for all STS and SPT in todays tx session with RW. Post SPT from EOb to reclineer pt was unable to maintain sitting balance in recliner and required return to supine position. pt remains at an increased risk for falls and injuries due to poor RW management, hand placement on RW, inability to follow directions for increased safety and balance in todays tx session. Post tx session pt continues to demonstrate the following defiicts: limited bed mobility, sitting balance/tolerance, standing tolerance/balance, OOB mobility as pt cotninues to be functioning below her baseline and would benefit from continued skilled PT intervention.Continue to recommend DC w/ level 2 moderate rehab resource intensity when medically cleared   Barriers to Discharge Decreased caregiver support   Goals   Patient Goals none stated   STG Expiration Date 02/12/24   PT Treatment Day 2   Plan   Treatment/Interventions Functional transfer training;LE  strengthening/ROM;Therapeutic exercise;Endurance training;Cognitive reorientation;Patient/family training;Equipment eval/education;Bed mobility;Gait training;Spoke to nursing;Compensatory technique education   Progress No functional improvements   PT Frequency 3-5x/wk   Discharge Recommendation   Rehab Resource Intensity Level, PT II (Moderate Resource Intensity)   Equipment Recommended Walker   Walker Package Recommended Wheeled walker   Change/add to Walker Package? No   AM-PAC Basic Mobility Inpatient   Turning in Flat Bed Without Bedrails 2   Lying on Back to Sitting on Edge of Flat Bed Without Bedrails 2   Moving Bed to Chair 2   Standing Up From Chair Using Arms 2   Walk in Room 1   Climb 3-5 Stairs With Railing 1   Basic Mobility Inpatient Raw Score 10   Highest Level Of Mobility   -HLM Goal 4: Move to chair/commode   JH-HLM Achieved 4: Move to chair/commode   Education   Education Provided Mobility training;Assistive device   Patient Reinforcement needed   End of Consult   Patient Position at End of Consult Supine;Bed/Chair alarm activated;All needs within reach   The patient's AM-PAC Basic Mobility Inpatient Short Form Raw Score is 10. A Raw score of less than or equal to 16 suggests the patient may benefit from discharge to post-acute rehabilitation services. Please also refer to the recommendation of the Physical Therapist for safe discharge planning.      Julito Bui

## 2024-02-06 NOTE — PLAN OF CARE
Problem: Nutrition/Hydration-ADULT  Goal: Nutrient/Hydration intake appropriate for improving, restoring or maintaining nutritional needs  Description: Monitor and assess patient's nutrition/hydration status for malnutrition. Collaborate with interdisciplinary team and initiate plan and interventions as ordered.  Monitor patient's weight and dietary intake as ordered or per policy. Utilize nutrition screening tool and intervene as necessary. Determine patient's food preferences and provide high-protein, high-caloric foods as appropriate.     INTERVENTIONS:  - Monitor oral intake, urinary output, labs, and treatment plans  - Assess nutrition and hydration status and recommend course of action  - Evaluate amount of meals eaten  - Assist patient with eating if necessary   - Allow adequate time for meals  - Recommend/ encourage appropriate diets, oral nutritional supplements, and vitamin/mineral supplements  - Order, calculate, and assess calorie counts as needed  - Recommend, monitor, and adjust tube feedings and TPN/PPN based on assessed needs  - Assess need for intravenous fluids  - Provide specific nutrition/hydration education as appropriate  - Include patient/family/caregiver in decisions related to nutrition  Outcome: Progressing     Problem: INFECTION - ADULT  Goal: Absence or prevention of progression during hospitalization  Description: INTERVENTIONS:  - Assess and monitor for signs and symptoms of infection  - Monitor lab/diagnostic results  - Monitor all insertion sites, i.e. indwelling lines, tubes, and drains  - Monitor endotracheal if appropriate and nasal secretions for changes in amount and color  - [place] appropriate cooling/warming therapies per order  - Administer medications as ordered  - Instruct and encourage patient and family to use good hand hygiene technique  - Identify and instruct in appropriate isolation precautions for identified infection/condition  Outcome: Progressing     Problem:  PAIN - ADULT  Goal: Verbalizes/displays adequate comfort level or baseline comfort level  Description: Interventions:  - Encourage patient to monitor pain and request assistance  - Assess pain using appropriate pain scale  - Administer analgesics based on type and severity of pain and evaluate response  - Implement non-pharmacological measures as appropriate and evaluate response  - Consider cultural and social influences on pain and pain management  - Notify physician/advanced practitioner if interventions unsuccessful or patient reports new pain  Outcome: Progressing     Problem: INFECTION - ADULT  Goal: Absence of fever/infection during neutropenic period  Description: INTERVENTIONS:  - Monitor WBC    Outcome: Progressing     Problem: SAFETY ADULT  Goal: Maintain or return to baseline ADL function  Description: INTERVENTIONS:  -  Assess patient's ability to carry out ADLs; assess patient's baseline for ADL function and identify physical deficits which impact ability to perform ADLs (bathing, care of mouth/teeth, toileting, grooming, dressing, etc.)  - Assess/evaluate cause of self-care deficits   - Assess range of motion  - Assess patient's mobility; develop plan if impaired  - Assess patient's need for assistive devices and provide as appropriate  - Encourage maximum independence but intervene and supervise when necessary  - Involve family in performance of ADLs  - Assess for home care needs following discharge   - Consider OT consult to assist with ADL evaluation and planning for discharge  - Provide patient education as appropriate  Outcome: Progressing     Problem: DISCHARGE PLANNING  Goal: Discharge to home or other facility with appropriate resources  Description: INTERVENTIONS:  - Identify barriers to discharge w/patient and caregiver  - Arrange for needed discharge resources and transportation as appropriate  - Identify discharge learning needs (meds, wound care, etc.)  - Arrange for interpretive services  to assist at discharge as needed  - Refer to Case Management Department for coordinating discharge planning if the patient needs post-hospital services based on physician/advanced practitioner order or complex needs related to functional status, cognitive ability, or social support system  Outcome: Progressing     Problem: Knowledge Deficit  Goal: Patient/family/caregiver demonstrates understanding of disease process, treatment plan, medications, and discharge instructions  Description: Complete learning assessment and assess knowledge base.  Interventions:  - Provide teaching at level of understanding  - Provide teaching via preferred learning methods  Outcome: Progressing     Problem: GENITOURINARY - ADULT  Goal: Maintains or returns to baseline urinary function  Description: INTERVENTIONS:  - Assess urinary function  - Encourage oral fluids to ensure adequate hydration if ordered  - Administer IV fluids as ordered to ensure adequate hydration  - Administer ordered medications as needed  - Offer frequent toileting  - Follow urinary retention protocol if ordered  Outcome: Progressing     Problem: GENITOURINARY - ADULT  Goal: Absence of urinary retention  Description: INTERVENTIONS:  - Assess patient’s ability to void and empty bladder  - Monitor I/O  - Bladder scan as needed  - Discuss with physician/AP medications to alleviate retention as needed  - Discuss catheterization for long term situations as appropriate  Outcome: Progressing     Problem: GENITOURINARY - ADULT  Goal: Urinary catheter remains patent  Description: INTERVENTIONS:  - Assess patency of urinary catheter  - If patient has a chronic navas, consider changing catheter if non-functioning  - Follow guidelines for intermittent irrigation of non-functioning urinary catheter  Outcome: Progressing     Problem: CARDIOVASCULAR - ADULT  Goal: Maintains optimal cardiac output and hemodynamic stability  Description: INTERVENTIONS:  - Monitor I/O, vital signs  and rhythm  - Monitor for S/S and trends of decreased cardiac output  - Administer and titrate ordered vasoactive medications to optimize hemodynamic stability  - Assess quality of pulses, skin color and temperature  - Assess for signs of decreased coronary artery perfusion  - Instruct patient to report change in severity of symptoms  Outcome: Progressing

## 2024-02-06 NOTE — ASSESSMENT & PLAN NOTE
Lab Results   Component Value Date    HGB 10.3 (L) 02/06/2024    HGB 10.9 (L) 02/05/2024    HGB 11.3 (L) 02/04/2024      - Patient had black, heme positive stools on AM of 1/31/24 during hospitalization   - GI consulted, appreciate recommendations   - Underwent EGD on 1/31/24 to rule out upper GI bleed, which revealed 2 ulcers (stomach and duodenum) but no active bleeding-negative for H. pylori    Plan:  Continue to monitor  Pantoprazole 4 mg twice daily  Repeat EGD in 3 months as outpatient

## 2024-02-06 NOTE — ASSESSMENT & PLAN NOTE
Lab Results   Component Value Date    HGB 10.3 (L) 02/06/2024    HGB 10.9 (L) 02/05/2024    HGB 11.3 (L) 02/04/2024    Plan as above

## 2024-02-06 NOTE — PLAN OF CARE
Problem: PHYSICAL THERAPY ADULT  Goal: Performs mobility at highest level of function for planned discharge setting.  See evaluation for individualized goals.  Description: Treatment/Interventions: Functional transfer training, LE strengthening/ROM, Therapeutic exercise, Endurance training, Cognitive reorientation, Patient/family training, Equipment eval/education, Bed mobility, Gait training, Compensatory technique education, Spoke to nursing, Spoke to case management      See flowsheet documentation for full assessment, interventions and recommendations.  Outcome: Not Progressing  Note: Prognosis: Fair  Problem List: Decreased range of motion, Decreased strength, Impaired balance, Decreased endurance, Decreased mobility, Impaired judgement, Decreased safety awareness  Assessment: pt began tx session lying supine in the bed and was agreeable to participate in PT intervention. Pt intervention to focus on bed mobility, transfer training, OOB mobility and increasing activity/standing tolerance. pt overall mobility status has declined from previous PT intervention w/ bed mobility, sitting balance, functional transfers, standing tolerance and all OOB mobility. pt required max ax1 w/ LE and trunk management in order to sit EOB from supine but required max Ax2 to return to supine position. pt continues to require assistance for static sitting EOB as pt initially was min ax1 due to retropulsion but was able to progress to close /s. pt limited w/ functional transfers in todays tx session as pt required mod ax1 for all STS and SPT in todays tx session with RW. Post SPT from EOb to reclineer pt was unable to maintain sitting balance in recliner and required return to supine position. pt remains at an increased risk for falls and injuries due to poor RW management, hand placement on RW, inability to follow directions for increased safety and balance in todays tx session. Post tx session pt continues to demonstrate the  following defiicts: limited bed mobility, sitting balance/tolerance, standing tolerance/balance, OOB mobility as pt cotninues to be functioning below her baseline and would benefit from continued skilled PT intervention.Continue to recommend DC w/ level 2 moderate rehab resource intensity when medically cleared  Barriers to Discharge: Decreased caregiver support     Rehab Resource Intensity Level, PT: II (Moderate Resource Intensity)    See flowsheet documentation for full assessment.

## 2024-02-06 NOTE — PROGRESS NOTES
Progress Note - Infectious Disease   Loida Duarte 78 y.o. female MRN: [id number]  Unit/Bed#: S -01 Encounter: [id number]      Impression/Plan:  Sepsis. POA. E/b fever to 100.7F, tachycardia, leukocytosis to 27, and lactic acidosis on admission. Most likely I/s/o #2, #3, and #4. CT C/A/P on admission showed a 6mm right distal ureteral calculus with right-sided hydronephrosis with perinephric and periureteral stranding. CT chest did not show any evidence of consolidation. Moderate cardiomegaly noted. COVID/Flu/RSV negative. UA obtained with innumerable WBC, large LE, and moderate blood c/f urinary source. Remains afebrile and HDS. WBC improved from 30 to 22 today. No new symptoms per patient. Urine culture from straight cath resulted positive for E.coli, Ucx from cystoscopy positive for E.faecalis. Now with c/f GI bleed. GI consulted for EGD.  EGD negative for a bleeding source.  Repeat blood cultures negative thus far.  White blood cell count has been steadily increasing of unclear source or significant.  Now slightly decreasing WBC count.  Antibiotics as below  Recheck blood cultures as below  Supportive care  Recheck CBC with differential to make sure white count does not continue to increase  Additional workup if the white count continues to rise.  Low threshold for stool for C. difficile     Polymicrobial bacteremia. Blood cultures on admission positive both sets (2/2) for GNRs and GPCs in pairs identified as E.coli and E.faecalis. Most likely etiology #3 and #4. TTE obtained showed moderate thickening of the mitral valve. Favor LUC to further assess for endocarditis.  Repeat blood cultures positive for gram-positive cocci concerning for persistent Enterococcus bacteremia.  LUC delayed due to previous GI bleeding, now due to respiratory status.  Continue intravenous ampicillin  Continue ceftriaxone for synergy in the setting of possible endocarditis  Follow-up repeat blood cultures to make sure bacteremia  clearing  Follow-up transesophageal echocardiogram results  Recheck CBC with differential and CMP to make sure not developing toxicity  Place PICC line     UTI complicated by pyelonephritis. Likely I/s/o #4. Likely etiology of #2. UA obtained in ED with innumerable WBC, large leukocytes, and moderate blood. CT A/P on admission showed #4 and perinephric and periureteral stranding. Ucx from straight cath resulted positive for E.coli, Ucx from cystoscopy positive for E.faecalis.   Antibiotics as above  No additional urologic workup for now     Right distal ureteral calculus c/b right-sided hydronephrosis. Likely etiology of #3. CT A/P on admission showed a 6mm right UVJ calculus. Now s/p OR on 1/29 for right ureteral stent placement with Urology.   Close urology follow-up.     5. RAKEL. Scr elevated to 2.8 on admission. Unknown baseline as patient has not seen a doctor in ~50 years. Consider pre-renal I/s/o sepsis vs obstructive I/s/o renal calculus.  Renal function continues to improve  Dose adjust antibiotics as needed  Volume management  Recheck BMP to see if need to dose adjust the antibiotics further     6. Acute hypoxic respiratory failure. POA. Noted to be 88% on room air. Also with diffuse wheezing on exam. CT chest demonstrated no acute pulmonary abnormalities. Showed cardiomegaly.  Still requiring oxygen support consider pulmonary edema.  Decreased oxygen needs  Monitor respiratory status  Oxygen support     7. Penicillin allergy- resolved. Had an allergy listed as swelling. States she had a rash as a child, but has not had it since. Has not seen a doctor in 50 years. Passed an amoxicillin challenge on 1/30. Tolerating ampicillin without difficulty.   No longer a true allergy      8. Morbid obesity. BMI noted to be 51 on admission. May affect antibiotic dosing.     Discussed with the primary service the plan to continue the ampicillin and ceftriaxone awaiting additional data.  They agree with the  plan.    Antibiotics:  Ampicillin 7  Ceftriaxone 6  Antibiotics 10  Negative blood cultures 4    Subjective:  Patient has no fever, chills, sweats; no nausea, vomiting, diarrhea; no cough, shortness of breath; no pain. No new symptoms.  Requiring very modest levels of oxygen by nasal cannula.  She states she is feeling much better overall today.    Objective:  Vitals:  Temp:  [97.6 °F (36.4 °C)-99.8 °F (37.7 °C)] 98.3 °F (36.8 °C)  HR:  [80-92] 88  Resp:  [18-24] 24  BP: (124-168)/(66-86) 161/86  SpO2:  [94 %-95 %] 94 %  Temp (24hrs), Av.3 °F (36.8 °C), Min:97.6 °F (36.4 °C), Max:99.8 °F (37.7 °C)  Current: Temperature: 98.3 °F (36.8 °C)    Physical Exam:   General Appearance:  Alert, interactive, nontoxic, no acute distress.   Throat: Oropharynx moist without lesions.    Lungs:   Decreased breath sounds bilaterally; no wheezes, rhonchi or rales; respirations unlabored   Heart:  RRR; no murmur, rub or gallop   Abdomen:   Soft, non-tender, non-distended, positive bowel sounds.     Extremities: No clubbing, cyanosis or edema   Skin: No new rashes or lesions. No draining wounds noted.       Labs, Imaging, & Other studies:   All pertinent labs and imaging studies were personally reviewed  Results from last 7 days   Lab Units 24   WBC Thousand/uL 19.71* 20.68* 16.24*   HEMOGLOBIN g/dL 10.3* 10.9* 11.3*   PLATELETS Thousands/uL 252 214 182     Results from last 7 days   Lab Units 248 24  0523   SODIUM mmol/L 137 138 137 136   POTASSIUM mmol/L 4.1 4.2 3.7 4.1   CHLORIDE mmol/L 107 106 107 104   CO2 mmol/L 22 25 21 25   BUN mg/dL 18 21 25 31*   CREATININE mg/dL 1.13 1.19 1.27 1.38*   EGFR ml/min/1.73sq m 46 43 39 36   CALCIUM mg/dL 7.3* 7.3* 7.4* 7.2*   AST U/L  --  15  --  20   ALT U/L  --  9  --  11   ALK PHOS U/L  --  80  --  74     Results from last 7 days   Lab Units 24  0453   BLOOD CULTURE  No Growth at 72 hrs.  No  Growth at 72 hrs.             Results from last 7 days   Lab Units 02/04/24  0458   FERRITIN ng/mL 193

## 2024-02-06 NOTE — ASSESSMENT & PLAN NOTE
Lab Results   Component Value Date     02/06/2024     02/05/2024     02/04/2024   Platelets improved today    - Continue to trend, resolved  - Likely secondary to sepsis  - Low suspicion for HIT

## 2024-02-06 NOTE — ASSESSMENT & PLAN NOTE
Lab Results   Component Value Date    CALCIUM 7.3 (L) 02/06/2024    CALCIUM 7.3 (L) 02/05/2024   Calcium low at this point, previously corrected to normal levels.  Giving vitamin D and calcium supplementation  Continue to monitor

## 2024-02-06 NOTE — ASSESSMENT & PLAN NOTE
Lab Results   Component Value Date    CREATININE 1.13 02/06/2024    CREATININE 1.19 02/05/2024    CREATININE 1.27 02/04/2024      Initial sCr - 2.58  Unknown baseline sCr   Likely worsened in setting of severe sepsis, obstructing renal calculus  Received 500cc bolus in ED, additional fluids initially held given respiratory status, elevated BNP and concern for volume overload.     Today creatinine continue to improve    Plan:  Maintain navas catheter  Strict I&O  Avoid nephrotoxins and hypotension  Monitor and replete electrolytes  Repeat BMP daily  Continue with gentle hydration   No

## 2024-02-07 ENCOUNTER — APPOINTMENT (INPATIENT)
Dept: RADIOLOGY | Facility: HOSPITAL | Age: 79
DRG: 853 | End: 2024-02-07
Payer: MEDICARE

## 2024-02-07 LAB
ANION GAP SERPL CALCULATED.3IONS-SCNC: 6 MMOL/L
ATRIAL RATE: 80 BPM
ATRIAL RATE: 85 BPM
ATRIAL RATE: 92 BPM
BACTERIA BLD CULT: NORMAL
BACTERIA BLD CULT: NORMAL
BUN SERPL-MCNC: 17 MG/DL (ref 5–25)
CALCIUM SERPL-MCNC: 7.3 MG/DL (ref 8.4–10.2)
CHLORIDE SERPL-SCNC: 105 MMOL/L (ref 96–108)
CO2 SERPL-SCNC: 25 MMOL/L (ref 21–32)
CREAT SERPL-MCNC: 1.14 MG/DL (ref 0.6–1.3)
ERYTHROCYTE [DISTWIDTH] IN BLOOD BY AUTOMATED COUNT: 14.8 % (ref 11.6–15.1)
GFR SERPL CREATININE-BSD FRML MDRD: 45 ML/MIN/1.73SQ M
GLUCOSE SERPL-MCNC: 84 MG/DL (ref 65–140)
HCT VFR BLD AUTO: 31.4 % (ref 34.8–46.1)
HGB BLD-MCNC: 10 G/DL (ref 11.5–15.4)
MAGNESIUM SERPL-MCNC: 1.7 MG/DL (ref 1.9–2.7)
MCH RBC QN AUTO: 31.8 PG (ref 26.8–34.3)
MCHC RBC AUTO-ENTMCNC: 31.8 G/DL (ref 31.4–37.4)
MCV RBC AUTO: 100 FL (ref 82–98)
P AXIS: -2 DEGREES
P AXIS: -25 DEGREES
P AXIS: 20 DEGREES
PLATELET # BLD AUTO: 298 THOUSANDS/UL (ref 149–390)
PMV BLD AUTO: 10.2 FL (ref 8.9–12.7)
POTASSIUM SERPL-SCNC: 4.2 MMOL/L (ref 3.5–5.3)
PR INTERVAL: 168 MS
PR INTERVAL: 184 MS
PR INTERVAL: 198 MS
QRS AXIS: -55 DEGREES
QRS AXIS: -57 DEGREES
QRS AXIS: -60 DEGREES
QRSD INTERVAL: 142 MS
QRSD INTERVAL: 142 MS
QRSD INTERVAL: 146 MS
QT INTERVAL: 412 MS
QT INTERVAL: 434 MS
QT INTERVAL: 446 MS
QTC INTERVAL: 509 MS
QTC INTERVAL: 514 MS
QTC INTERVAL: 516 MS
RBC # BLD AUTO: 3.14 MILLION/UL (ref 3.81–5.12)
SODIUM SERPL-SCNC: 136 MMOL/L (ref 135–147)
T WAVE AXIS: 56 DEGREES
T WAVE AXIS: 58 DEGREES
T WAVE AXIS: 72 DEGREES
VENTRICULAR RATE: 80 BPM
VENTRICULAR RATE: 85 BPM
VENTRICULAR RATE: 92 BPM
WBC # BLD AUTO: 20.76 THOUSAND/UL (ref 4.31–10.16)

## 2024-02-07 PROCEDURE — 80048 BASIC METABOLIC PNL TOTAL CA: CPT

## 2024-02-07 PROCEDURE — 99222 1ST HOSP IP/OBS MODERATE 55: CPT | Performed by: INTERNAL MEDICINE

## 2024-02-07 PROCEDURE — 99233 SBSQ HOSP IP/OBS HIGH 50: CPT | Performed by: HOSPITALIST

## 2024-02-07 PROCEDURE — 85027 COMPLETE CBC AUTOMATED: CPT

## 2024-02-07 PROCEDURE — 71045 X-RAY EXAM CHEST 1 VIEW: CPT

## 2024-02-07 PROCEDURE — 83735 ASSAY OF MAGNESIUM: CPT

## 2024-02-07 PROCEDURE — 97535 SELF CARE MNGMENT TRAINING: CPT

## 2024-02-07 PROCEDURE — 93005 ELECTROCARDIOGRAM TRACING: CPT

## 2024-02-07 PROCEDURE — 93010 ELECTROCARDIOGRAM REPORT: CPT | Performed by: INTERNAL MEDICINE

## 2024-02-07 PROCEDURE — 02H633Z INSERTION OF INFUSION DEVICE INTO RIGHT ATRIUM, PERCUTANEOUS APPROACH: ICD-10-PCS | Performed by: INTERNAL MEDICINE

## 2024-02-07 PROCEDURE — 99232 SBSQ HOSP IP/OBS MODERATE 35: CPT | Performed by: STUDENT IN AN ORGANIZED HEALTH CARE EDUCATION/TRAINING PROGRAM

## 2024-02-07 PROCEDURE — C1751 CATH, INF, PER/CENT/MIDLINE: HCPCS

## 2024-02-07 RX ORDER — METOPROLOL SUCCINATE 25 MG/1
25 TABLET, EXTENDED RELEASE ORAL ONCE
Status: DISCONTINUED | OUTPATIENT
Start: 2024-02-07 | End: 2024-02-07

## 2024-02-07 RX ORDER — METOPROLOL TARTRATE 50 MG/1
50 TABLET, FILM COATED ORAL ONCE
Status: COMPLETED | OUTPATIENT
Start: 2024-02-07 | End: 2024-02-07

## 2024-02-07 RX ORDER — NYSTATIN 100000 [USP'U]/G
POWDER TOPICAL 2 TIMES DAILY
Status: DISCONTINUED | OUTPATIENT
Start: 2024-02-07 | End: 2024-02-08 | Stop reason: HOSPADM

## 2024-02-07 RX ORDER — MAGNESIUM SULFATE HEPTAHYDRATE 40 MG/ML
2 INJECTION, SOLUTION INTRAVENOUS ONCE
Status: COMPLETED | OUTPATIENT
Start: 2024-02-07 | End: 2024-02-07

## 2024-02-07 RX ORDER — METOPROLOL SUCCINATE 25 MG/1
25 TABLET, EXTENDED RELEASE ORAL DAILY
Status: DISCONTINUED | OUTPATIENT
Start: 2024-02-07 | End: 2024-02-07

## 2024-02-07 RX ORDER — FUROSEMIDE 20 MG/1
20 TABLET ORAL ONCE
Status: COMPLETED | OUTPATIENT
Start: 2024-02-07 | End: 2024-02-07

## 2024-02-07 RX ADMIN — AMLODIPINE BESYLATE 10 MG: 10 TABLET ORAL at 08:33

## 2024-02-07 RX ADMIN — HEPARIN SODIUM 5000 UNITS: 5000 INJECTION INTRAVENOUS; SUBCUTANEOUS at 14:18

## 2024-02-07 RX ADMIN — Medication 1 TABLET: at 08:35

## 2024-02-07 RX ADMIN — NYSTATIN: 100000 POWDER TOPICAL at 21:34

## 2024-02-07 RX ADMIN — METOPROLOL TARTRATE 50 MG: 50 TABLET, FILM COATED ORAL at 14:18

## 2024-02-07 RX ADMIN — HEPARIN SODIUM 5000 UNITS: 5000 INJECTION INTRAVENOUS; SUBCUTANEOUS at 05:32

## 2024-02-07 RX ADMIN — FUROSEMIDE 20 MG: 20 TABLET ORAL at 16:02

## 2024-02-07 RX ADMIN — AMPICILLIN SODIUM 2000 MG: 2 INJECTION, POWDER, FOR SOLUTION INTRAVENOUS at 21:34

## 2024-02-07 RX ADMIN — DEXTROSE 150 MG: 50 INJECTION, SOLUTION INTRAVENOUS at 14:50

## 2024-02-07 RX ADMIN — HEPARIN SODIUM 5000 UNITS: 5000 INJECTION INTRAVENOUS; SUBCUTANEOUS at 21:34

## 2024-02-07 RX ADMIN — CHLORHEXIDINE GLUCONATE 15 ML: 1.2 SOLUTION ORAL at 21:34

## 2024-02-07 RX ADMIN — ATORVASTATIN CALCIUM 80 MG: 40 TABLET, FILM COATED ORAL at 18:15

## 2024-02-07 RX ADMIN — SALINE NASAL SPRAY 2 SPRAY: 1.5 SOLUTION NASAL at 10:21

## 2024-02-07 RX ADMIN — CLOPIDOGREL BISULFATE 75 MG: 75 TABLET ORAL at 08:33

## 2024-02-07 RX ADMIN — ASPIRIN 81 MG: 81 TABLET, COATED ORAL at 08:33

## 2024-02-07 RX ADMIN — PANTOPRAZOLE SODIUM 40 MG: 40 TABLET, DELAYED RELEASE ORAL at 05:33

## 2024-02-07 RX ADMIN — PANTOPRAZOLE SODIUM 40 MG: 40 TABLET, DELAYED RELEASE ORAL at 16:02

## 2024-02-07 RX ADMIN — ALBUTEROL SULFATE 2 PUFF: 90 AEROSOL, METERED RESPIRATORY (INHALATION) at 14:16

## 2024-02-07 RX ADMIN — Medication 6 MG: at 21:34

## 2024-02-07 RX ADMIN — CHLORHEXIDINE GLUCONATE 15 ML: 1.2 SOLUTION ORAL at 08:33

## 2024-02-07 RX ADMIN — AMPICILLIN SODIUM 2000 MG: 2 INJECTION, POWDER, FOR SOLUTION INTRAVENOUS at 17:24

## 2024-02-07 RX ADMIN — AMPICILLIN SODIUM 2000 MG: 2 INJECTION, POWDER, FOR SOLUTION INTRAVENOUS at 04:26

## 2024-02-07 RX ADMIN — MAGNESIUM SULFATE HEPTAHYDRATE 2 G: 40 INJECTION, SOLUTION INTRAVENOUS at 08:21

## 2024-02-07 RX ADMIN — AMPICILLIN SODIUM 2000 MG: 2 INJECTION, POWDER, FOR SOLUTION INTRAVENOUS at 11:16

## 2024-02-07 NOTE — ASSESSMENT & PLAN NOTE
Lab Results   Component Value Date    HGB 10.0 (L) 02/07/2024    HGB 10.3 (L) 02/06/2024    HGB 10.9 (L) 02/05/2024      - Patient had black, heme positive stools on AM of 1/31/24 during hospitalization   - GI consulted, appreciate recommendations   - Underwent EGD on 1/31/24 to rule out upper GI bleed, which revealed 2 ulcers (stomach and duodenum) but no active bleeding-negative for H. pylori    Plan:  Continue to monitor  Pantoprazole 4 mg twice daily  Repeat EGD in 3 months as outpatient

## 2024-02-07 NOTE — ASSESSMENT & PLAN NOTE
As evidenced by initial fever (100.7), tachycardia (110), tachypnea (26), leukocytosis (30), Lactate 3.8  Source: Urosepsis, bacteremia  CT chest/ABD/pelvis - 6mm calculus in the right ureterovesical junction and moderate hydronephrosis. Signs of pyelonephritis  Procalcitonin - 230  Received 500cc resuscitation fluid in ED, not 30cc/kg given concerns for respiratory status as pt noted to be tachyneic, hypoxic and wheezing after receiving IVF  Blood cultures x 2 drawn - positive for E faecalis and E. coli  UA: +leukocytes, +WBC, occasional bacteria, negative nitrites  Received Vancomycin, Cefepime, Cipro in ED  Blood cultures positive for E coli and enterococcus faecalis, urine culture positive for gram negative rods  LUC without signs of vegetation or endocarditis, PICC placed     Plan:  Currently on ampicillin, with PICC placement  ID consulted, recommendations appreciated  Follow repeat blood cultures, obtained 2/2/2024.  No growth at 4 days  Trend WBC, fever curve

## 2024-02-07 NOTE — ASSESSMENT & PLAN NOTE
CT chest/ABD/pelvis - 6mm calculus in the right ureterovesical junction and moderate hydronephrosis.  Urology consulted in ED and patient was taken to OR for cysto with stent placement on 1/29/24    Plan:  Continue Abx  Remove King catheter, voiding trial  Monitor I&O  Monitor renal function

## 2024-02-07 NOTE — PLAN OF CARE
Problem: Potential for Falls  Goal: Patient will remain free of falls  Description: INTERVENTIONS:  - Educate patient/family on patient safety including physical limitations  - Instruct patient to call for assistance with activity   - Consult OT/PT to assist with strengthening/mobility   - Keep Call bell within reach  - Keep bed low and locked with side rails adjusted as appropriate  - Keep care items and personal belongings within reach  - Initiate and maintain comfort rounds  - Make Fall Risk Sign visible to staff  - Offer Toileting every 2 Hours, in advance of need  - Initiate/Maintain bed alarm  - Obtain necessary fall risk management equipment: bed alarm  - Apply yellow socks and bracelet for high fall risk patients  - Consider moving patient to room near nurses station  Outcome: Progressing     Problem: Prexisting or High Potential for Compromised Skin Integrity  Goal: Skin integrity is maintained or improved  Description: INTERVENTIONS:  - Identify patients at risk for skin breakdown  - Assess and monitor skin integrity  - Assess and monitor nutrition and hydration status  - Monitor labs   - Assess for incontinence   - Turn and reposition patient  - Assist with mobility/ambulation  - Relieve pressure over bony prominences  - Avoid friction and shearing  - Provide appropriate hygiene as needed including keeping skin clean and dry  - Evaluate need for skin moisturizer/barrier cream  - Collaborate with interdisciplinary team   - Patient/family teaching  - Consider wound care consult   Outcome: Progressing     Problem: Nutrition/Hydration-ADULT  Goal: Nutrient/Hydration intake appropriate for improving, restoring or maintaining nutritional needs  Description: Monitor and assess patient's nutrition/hydration status for malnutrition. Collaborate with interdisciplinary team and initiate plan and interventions as ordered.  Monitor patient's weight and dietary intake as ordered or per policy. Utilize nutrition  screening tool and intervene as necessary. Determine patient's food preferences and provide high-protein, high-caloric foods as appropriate.     INTERVENTIONS:  - Monitor oral intake, urinary output, labs, and treatment plans  - Assess nutrition and hydration status and recommend course of action  - Evaluate amount of meals eaten  - Assist patient with eating if necessary   - Allow adequate time for meals  - Recommend/ encourage appropriate diets, oral nutritional supplements, and vitamin/mineral supplements  - Order, calculate, and assess calorie counts as needed  - Recommend, monitor, and adjust tube feedings and TPN/PPN based on assessed needs  - Assess need for intravenous fluids  - Provide specific nutrition/hydration education as appropriate  - Include patient/family/caregiver in decisions related to nutrition  Outcome: Progressing     Problem: PAIN - ADULT  Goal: Verbalizes/displays adequate comfort level or baseline comfort level  Description: Interventions:  - Encourage patient to monitor pain and request assistance  - Assess pain using appropriate pain scale  - Administer analgesics based on type and severity of pain and evaluate response  - Implement non-pharmacological measures as appropriate and evaluate response  - Consider cultural and social influences on pain and pain management  - Notify physician/advanced practitioner if interventions unsuccessful or patient reports new pain  Outcome: Progressing     Problem: INFECTION - ADULT  Goal: Absence or prevention of progression during hospitalization  Description: INTERVENTIONS:  - Assess and monitor for signs and symptoms of infection  - Monitor lab/diagnostic results  - Monitor all insertion sites, i.e. indwelling lines, tubes, and drains  - Monitor endotracheal if appropriate and nasal secretions for changes in amount and color  - [place] appropriate cooling/warming therapies per order  - Administer medications as ordered  - Instruct and encourage  patient and family to use good hand hygiene technique  - Identify and instruct in appropriate isolation precautions for identified infection/condition  Outcome: Progressing  Goal: Absence of fever/infection during neutropenic period  Description: INTERVENTIONS:  - Monitor WBC    Outcome: Progressing     Problem: SAFETY ADULT  Goal: Patient will remain free of falls  Description: INTERVENTIONS:  - Educate patient/family on patient safety including physical limitations  - Instruct patient to call for assistance with activity   - Consult OT/PT to assist with strengthening/mobility   - Keep Call bell within reach  - Keep bed low and locked with side rails adjusted as appropriate  - Keep care items and personal belongings within reach  - Initiate and maintain comfort rounds  - Make Fall Risk Sign visible to staff  - Offer Toileting every 2 Hours, in advance of need  - Initiate/Maintain bed alarm  - Obtain necessary fall risk management equipment: bed alarm  - Apply yellow socks and bracelet for high fall risk patients  - Consider moving patient to room near nurses station  Outcome: Progressing  Goal: Maintain or return to baseline ADL function  Description: INTERVENTIONS:  -  Assess patient's ability to carry out ADLs; assess patient's baseline for ADL function and identify physical deficits which impact ability to perform ADLs (bathing, care of mouth/teeth, toileting, grooming, dressing, etc.)  - Assess/evaluate cause of self-care deficits   - Assess range of motion  - Assess patient's mobility; develop plan if impaired  - Assess patient's need for assistive devices and provide as appropriate  - Encourage maximum independence but intervene and supervise when necessary  - Involve family in performance of ADLs  - Assess for home care needs following discharge   - Consider OT consult to assist with ADL evaluation and planning for discharge  - Provide patient education as appropriate  Outcome: Progressing  Goal:  Maintains/Returns to pre admission functional level  Description: INTERVENTIONS:  - Perform AM-PAC 6 Click Basic Mobility/ Daily Activity assessment daily.  - Set and communicate daily mobility goal to care team and patient/family/caregiver.   - Collaborate with rehabilitation services on mobility goals if consulted  - Perform Range of Motion 2 times a day.  - Reposition patient every 3 hours.  - Dangle patient 2 times a day  - Stand patient 2 times a day  - Ambulate patient 3 times a day  - Out of bed to chair 3 times a day   - Out of bed for meals 3 times a day  - Out of bed for toileting  - Record patient progress and toleration of activity level   Outcome: Progressing     Problem: DISCHARGE PLANNING  Goal: Discharge to home or other facility with appropriate resources  Description: INTERVENTIONS:  - Identify barriers to discharge w/patient and caregiver  - Arrange for needed discharge resources and transportation as appropriate  - Identify discharge learning needs (meds, wound care, etc.)  - Arrange for interpretive services to assist at discharge as needed  - Refer to Case Management Department for coordinating discharge planning if the patient needs post-hospital services based on physician/advanced practitioner order or complex needs related to functional status, cognitive ability, or social support system  Outcome: Progressing     Problem: Knowledge Deficit  Goal: Patient/family/caregiver demonstrates understanding of disease process, treatment plan, medications, and discharge instructions  Description: Complete learning assessment and assess knowledge base.  Interventions:  - Provide teaching at level of understanding  - Provide teaching via preferred learning methods  Outcome: Progressing     Problem: GENITOURINARY - ADULT  Goal: Maintains or returns to baseline urinary function  Description: INTERVENTIONS:  - Assess urinary function  - Encourage oral fluids to ensure adequate hydration if ordered  -  Administer IV fluids as ordered to ensure adequate hydration  - Administer ordered medications as needed  - Offer frequent toileting  - Follow urinary retention protocol if ordered  Outcome: Progressing  Goal: Absence of urinary retention  Description: INTERVENTIONS:  - Assess patient’s ability to void and empty bladder  - Monitor I/O  - Bladder scan as needed  - Discuss with physician/AP medications to alleviate retention as needed  - Discuss catheterization for long term situations as appropriate  Outcome: Progressing  Goal: Urinary catheter remains patent  Description: INTERVENTIONS:  - Assess patency of urinary catheter  - If patient has a chronic navas, consider changing catheter if non-functioning  - Follow guidelines for intermittent irrigation of non-functioning urinary catheter  Outcome: Progressing     Problem: CARDIOVASCULAR - ADULT  Goal: Maintains optimal cardiac output and hemodynamic stability  Description: INTERVENTIONS:  - Monitor I/O, vital signs and rhythm  - Monitor for S/S and trends of decreased cardiac output  - Administer and titrate ordered vasoactive medications to optimize hemodynamic stability  - Assess quality of pulses, skin color and temperature  - Assess for signs of decreased coronary artery perfusion  - Instruct patient to report change in severity of symptoms  Outcome: Progressing  Goal: Absence of cardiac dysrhythmias or at baseline rhythm  Description: INTERVENTIONS:  - Continuous cardiac monitoring, vital signs, obtain 12 lead EKG if ordered  - Administer antiarrhythmic and heart rate control medications as ordered  - Monitor electrolytes and administer replacement therapy as ordered  Outcome: Progressing

## 2024-02-07 NOTE — PROGRESS NOTES
[facility]    Cardiology Progress Note  Loida Duarte 78 y.o. female   YOB: 1945 MRN: [id number]  Unit/Bed#: S -01 Encounter: [id number]      Subjective:   No significant events overnight.  She continues to be very weak and tired and has difficulty getting out of bed    Assessments  Principal Problem:    Severe sepsis (HCC)  Active Problems:    Right ureteral calculus    Acute respiratory insufficiency    RAKEL (acute kidney injury) ([facility])    Elevated troponin    Obstructive pyelonephritis    Heme positive stool    Hypertension    Anemia    Electrolyte abnormality    Sepsis 2/2 ureteral calculus  S/p D-J uretenal stent insertion (1/29/24)  Blood culture -   1/28/24: E.coli and enterococcus faecalis  1/30/24: E. Faecalis  2/2/24: no growth at 72 hours  Elevated troponin / type 2 MI  Peaked at 14k  RAKEL  Post-renal, now resolved  Hyperlipidemia  Morbid obesity, Body mass index is 53.89 kg/m².   Fecal occult blood positive  Anemia     Plan:  Sepsis, r/o endocarditis  LUC results reviewed with patient and daughter today  No definite evidence of valvular vegetation or endocarditis, or any significant valvular regurgitation.    Technically difficult study and there is significant valvular thickening and calcification noted on aortic and mitral valve, likely related to valvular degeneration.  WBC slightly down today  No complaints of fever  Continue antibiotics per ID    Type 2 MI   She was also initially scheduled for cardiac catheterization yesterday, but considering her absence of ischemic symptoms, her overall comorbid status and severe weakness, after discussion with interventional cardiology, cath was held off yesterday  The patient appears to have inferior wall motion abnormality on echocardiogram she does not have any current or recent symptoms of chest pain anginal equivalent, and this may be an old infarct  There is an additional concern about fecal occult blood test being  "positive on 24 and she has anemia, which may further limit intervention options during cath  At this point, with absence of symptoms, it is in her best interest that she continue current medical therapy and get stronger from her current infectious issues  When she is a bit stronger and able to pursue further testing, we can consider a nuclear Lexiscan stress test to risk stratify further  If needed, this could be completed as an outpatient  If she has more symptoms of chest pain or worsening shortness of breath, then we can reconsider ischemic evaluation in the hospital  She had elevated troponins and concerns about wall motion abnormity on echocardiogram and as a result is also scheduled for cath & is NPO         Review of Systems   Constitutional:  Positive for activity change and fatigue.   Respiratory:  Negative for chest tightness.    Neurological:  Positive for weakness.   All other systems reviewed and are negative.      Objective:   Vitals: Blood pressure 169/72, pulse 92, temperature 98.3 °F (36.8 °C), resp. rate 19, height 4' 9.01\" (1.448 m), weight 113 kg (249 lb 1.9 oz), SpO2 96%., Body mass index is 53.89 kg/m².,   Orthostatic Blood Pressures      Flowsheet Row Most Recent Value   Blood Pressure 169/72 filed at 2024 1513   Patient Position - Orthostatic VS Lying filed at 2024 1513           Systolic (24hrs), Av , Min:161 , Max:169     Diastolic (24hrs), Av, Min:72, Max:86    Wt Readings from Last 5 Encounters:   24 113 kg (249 lb 1.9 oz)     I/O          0701   0700  0701   0700    P.O. 240     I.V. (mL/kg) 150 (1.3)     Total Intake(mL/kg) 390 (3.5)     Urine (mL/kg/hr) 900 (0.3)     Stool      Total Output 900     Net -510                       Physical Exam  Vitals and nursing note reviewed.   Constitutional:       General: She is not in acute distress.     Appearance: Normal appearance. She is well-developed. She is obese. She is ill-appearing. "   HENT:      Head: Normocephalic and atraumatic.      Nose: No congestion.   Eyes:      General: No scleral icterus.     Conjunctiva/sclera: Conjunctivae normal.   Neck:      Vascular: No carotid bruit or JVD.   Cardiovascular:      Rate and Rhythm: Normal rate and regular rhythm.      Pulses: Normal pulses.      Heart sounds: Normal heart sounds. No murmur heard.     No friction rub. No gallop.   Pulmonary:      Effort: Pulmonary effort is normal. No respiratory distress.      Breath sounds: Normal breath sounds. No rales.   Abdominal:      General: There is no distension.      Palpations: Abdomen is soft.      Tenderness: There is no abdominal tenderness.   Musculoskeletal:         General: No swelling or tenderness.      Cervical back: Neck supple.      Right lower leg: No edema.      Left lower leg: No edema.   Skin:     General: Skin is warm.   Neurological:      General: No focal deficit present.      Mental Status: She is alert and oriented to person, place, and time. Mental status is at baseline.   Psychiatric:         Mood and Affect: Mood normal.         Behavior: Behavior normal.         Thought Content: Thought content normal.         Laboratory Results: personally reviewed        CBC with diff:   Results from last 7 days   Lab Units 02/06/24  0517 02/05/24  0411 02/04/24  0458 02/03/24  0523 02/02/24  0503 02/01/24  0502 01/31/24  0508   WBC Thousand/uL 19.71* 20.68* 16.24* 13.71* 12.13* 15.03* 22.74*   HEMOGLOBIN g/dL 10.3* 10.9* 11.3* 11.2* 10.7* 11.8 12.3   HEMATOCRIT % 32.7* 34.3* 35.5 34.6* 32.8* 36.6 38.4   MCV fL 100* 99* 101* 99* 97 99* 99*   PLATELETS Thousands/uL 252 214 182 126* 99* 86* 92*   RBC Million/uL 3.28* 3.47* 3.52* 3.51* 3.39* 3.71* 3.88   MCH pg 31.4 31.4 32.1 31.9 31.6 31.8 31.7   MCHC g/dL 31.5 31.8 31.8 32.4 32.6 32.2 32.0   RDW % 14.8 15.0 15.1 14.9 14.7 14.6 14.6   MPV fL 10.6 10.9 12.1 12.2 12.5 13.0* 12.2         CMP:  Results from last 7 days   Lab Units 02/06/24  0517  "02/05/24  0411 02/04/24  0458 02/03/24  0523 02/02/24  0503 02/01/24  0502 01/31/24  0508   POTASSIUM mmol/L 4.1 4.2 3.7 4.1 3.9 4.2 4.5   CHLORIDE mmol/L 107 106 107 104 105 105 103   CO2 mmol/L 22 25 21 25 22 21 22   BUN mg/dL 18 21 25 31* 43* 52* 57*   CREATININE mg/dL 1.13 1.19 1.27 1.38* 1.56* 1.84* 2.26*   CALCIUM mg/dL 7.3* 7.3* 7.4* 7.2* 7.6* 7.8* 8.5   AST U/L  --  15  --  20  --   --   --    ALT U/L  --  9  --  11  --   --   --    ALK PHOS U/L  --  80  --  74  --   --   --    EGFR ml/min/1.73sq m 46 43 39 36 31 25 19         BMP:  Results from last 7 days   Lab Units 02/06/24 0517 02/05/24 0411 02/04/24 0458 02/03/24  0523 02/02/24  0503 02/01/24  0502 01/31/24  0508   POTASSIUM mmol/L 4.1 4.2 3.7 4.1 3.9 4.2 4.5   CHLORIDE mmol/L 107 106 107 104 105 105 103   CO2 mmol/L 22 25 21 25 22 21 22   BUN mg/dL 18 21 25 31* 43* 52* 57*   CREATININE mg/dL 1.13 1.19 1.27 1.38* 1.56* 1.84* 2.26*   CALCIUM mg/dL 7.3* 7.3* 7.4* 7.2* 7.6* 7.8* 8.5       BNP: No results for input(s): \"BNP\" in the last 72 hours.    Magnesium:   Results from last 7 days   Lab Units 02/05/24 0411 02/04/24 0458 02/02/24  0503 02/01/24  0502 01/31/24  0508   MAGNESIUM mg/dL 2.1 1.9 2.1 2.2 2.2       Coags:   Results from last 7 days   Lab Units 02/05/24  0411 01/31/24  0341 01/30/24  2128   PTT seconds  --  75* 53*   INR  1.70*  --   --        TSH:        Hemoglobin A1C       Lipid Profile:       Meds/Allergies   all current active meds have been reviewed and current meds:   Current Facility-Administered Medications   Medication Dose Route Frequency    acetaminophen (TYLENOL) tablet 975 mg  975 mg Oral Q8H PRN    albuterol (PROVENTIL HFA,VENTOLIN HFA) inhaler 2 puff  2 puff Inhalation Q4H PRN    amLODIPine (NORVASC) tablet 10 mg  10 mg Oral Daily    ampicillin (OMNIPEN) 2,000 mg in sodium chloride 0.9 % 100 mL IVPB  2,000 mg Intravenous Q6H    aspirin (ECOTRIN LOW STRENGTH) EC tablet 81 mg  81 mg Oral Daily    atorvastatin (LIPITOR) " tablet 80 mg  80 mg Oral QPM    calcium carbonate-vitamin D 500 mg-5 mcg tablet 1 tablet  1 tablet Oral Daily With Breakfast    cefTRIAXone (ROCEPHIN) 2,000 mg in dextrose 5 % 50 mL IVPB  2,000 mg Intravenous Q12H    chlorhexidine (PERIDEX) 0.12 % oral rinse 15 mL  15 mL Mouth/Throat Q12H GUNJAN    clopidogrel (PLAVIX) tablet 75 mg  75 mg Oral Daily    diphenhydrAMINE (BENADRYL) 25 mg in sodium chloride 0.9 % 50 mL IVPB  25 mg Intravenous Q6H PRN    diphenhydramine, lidocaine, Al/Mg hydroxide, simethicone (Magic Mouthwash) oral solution 10 mL  10 mL Swish & Spit Q6H PRN    EPINEPHrine PF (ADRENALIN) 1 mg/mL injection 0.3 mg  0.3 mg Intramuscular Once PRN    heparin (porcine) subcutaneous injection 5,000 Units  5,000 Units Subcutaneous Q8H GUNJAN    hydrALAZINE (APRESOLINE) injection 10 mg  10 mg Intravenous Q8H PRN    levalbuterol (XOPENEX) inhalation solution 1.25 mg  1.25 mg Nebulization TID PRN    melatonin tablet 6 mg  6 mg Oral HS    pantoprazole (PROTONIX) EC tablet 40 mg  40 mg Oral BID AC    sodium chloride (OCEAN) 0.65 % nasal spray 2 spray  2 spray Each Nare Daily    trimethobenzamide (TIGAN) IM injection 200 mg  200 mg Intramuscular Q6H PRN     No medications prior to admission.            Cardiac testing: reviewed  No results found for this or any previous visit.    No results found for this or any previous visit.    No results found for this or any previous visit.    No results found for this or any previous visit.

## 2024-02-07 NOTE — ASSESSMENT & PLAN NOTE
Lab Results   Component Value Date    CALCIUM 7.3 (L) 02/07/2024    CALCIUM 7.3 (L) 02/06/2024   Calcium low at this point, previously corrected to normal levels.  Giving vitamin D and calcium supplementation  Continue to monitor

## 2024-02-07 NOTE — QUICK NOTE
RENAL NOTE   Loida Duarte 78 y.o. female MRN: [id number]  Unit/Bed#: S -01 Encounter: [id number]    Discussed with cardiology on February 6, 2024.  No current plan for cardiac catheterization while inpatient unless change in symptomatology.   Renal function is stable.  Creatinine 1.14.  Nothing further to add from a renal standpoint. Will sign off.   Feel free to call us back for any questions or concerns.

## 2024-02-07 NOTE — PROCEDURES
Insert Complex Venous Access Line    Date/Time: 2/7/2024 1:00 PM    Performed by: Maranda Whittington RN  Authorized by: John Omer MD    Patient location:  Bedside  Consent:     Consent obtained:  Verbal and written (obtained by md)    Consent given by:  Guardian (obtained by md)    Procedural risks discussed: discussed with md.    Alternatives discussed: discussed with md.  Universal protocol:     Procedure explained and questions answered to patient or proxy's satisfaction: yes      Relevant documents present and verified: yes      Test results available and properly labeled: yes      Radiology Images displayed and confirmed.  If images not available, report reviewed: yes      Required blood products, implants, devices, and special equipment available: yes      Site/side marked: yes      Immediately prior to procedure, a time out was called: yes      Patient identity confirmed:  Verbally with patient and arm band  Pre-procedure details:     Hand hygiene: Hand hygiene performed prior to insertion      Sterile barrier technique: All elements of maximal sterile technique followed      Skin preparation:  2% chlorhexidine    Skin preparation agent: Skin preparation agent completely dried prior to procedure    Indications:     PICC line indications: long term antibiotics    Anesthesia (see MAR for exact dosages):     Anesthesia method:  Local infiltration    Local anesthetic:  Lidocaine 1% w/o epi (2ml used)  Procedure details:     Location:  Cephalic    Vessel type: vein      Laterality:  Right    Approach: percutaneous technique used      Patient position:  Flat    Procedural supplies:  Single lumen    Catheter size:  4 Fr    Landmarks identified: yes      Ultrasound guidance: yes      Ultrasound image availability:  Not saved    Sterile ultrasound techniques: Sterile gel and sterile probe covers were used      Number of attempts:  1    Successful placement: yes      Vessel of catheter tip end:  Chest Xray needed  to confirm placement    Total catheter length (cm):  46    Catheter out on skin (cm):  3cm    Max flow rate:  999ml/hr    Arm circumference:  57cm  Post-procedure details:     Post-procedure:  Dressing applied and securement device placed    Post-procedure complications: none      Patient tolerance of procedure:  Tolerated well, no immediate complications  Comments:      CXR pulled back 3 cm, RN aware ok to use picc line

## 2024-02-07 NOTE — PROGRESS NOTES
[facility]  Progress Note  Name: Loida Duarte I  MRN: [id number]  Unit/Bed#: S -01 I Date of Admission: 1/28/2024   Date of Service: 2/7/2024 I Hospital Day: 9    Assessment/Plan   * Severe sepsis (HCC)  Assessment & Plan  As evidenced by initial fever (100.7), tachycardia (110), tachypnea (26), leukocytosis (30), Lactate 3.8  Source: Urosepsis, bacteremia  CT chest/ABD/pelvis - 6mm calculus in the right ureterovesical junction and moderate hydronephrosis. Signs of pyelonephritis  Procalcitonin - 230  Received 500cc resuscitation fluid in ED, not 30cc/kg given concerns for respiratory status as pt noted to be tachyneic, hypoxic and wheezing after receiving IVF  Blood cultures x 2 drawn - positive for E faecalis and E. coli  UA: +leukocytes, +WBC, occasional bacteria, negative nitrites  Received Vancomycin, Cefepime, Cipro in ED  Blood cultures positive for E coli and enterococcus faecalis, urine culture positive for gram negative rods  LUC without signs of vegetation or endocarditis, PICC placed     Plan:  Currently on ampicillin, with PICC placement  ID consulted, recommendations appreciated  Follow repeat blood cultures, obtained 2/2/2024.  No growth at 4 days  Trend WBC, fever curve       Elevated troponin  Assessment & Plan  Pt noted to be SOB, wheezing, tachypnea. Endorsed chest pain while in OR.  hsTroponin elevated  BNP - 2,737  CXR unremarkable  Cardiac echo 1/29 showed normal EF 65%, and grade 1 diastolic dysfunction with hypokinesis of basal inferoseptal and inferior walls   Concern for Type II MI per cardiology secondary to sepsis/hypoxia, although there is concern for CAD and she will eventually need cath     Plan:  Cardiology consulted, appreciate recommendations  Continue ASA/Plavix.  Subcu heparin for DVT prophylaxis  Tentative cardiac cath as inpatient  Continue supplemental oxygen, wean as tolerated    Obstructive pyelonephritis  Assessment & Plan  Patient had  right-sided ureterovesical calculus and hydronephrosis  Patient had urology consulted. Cystoscopy ureteroscopy and stent placement completed       Right ureteral calculus  Assessment & Plan  CT chest/ABD/pelvis - 6mm calculus in the right ureterovesical junction and moderate hydronephrosis.  Urology consulted in ED and patient was taken to OR for cysto with stent placement on 1/29/24    Plan:  Continue Abx  Remove King catheter, voiding trial  Monitor I&O  Monitor renal function    RAKEL (acute kidney injury) (HCC)  Assessment & Plan  Lab Results   Component Value Date    CREATININE 1.14 02/07/2024    CREATININE 1.13 02/06/2024    CREATININE 1.19 02/05/2024      Initial sCr - 2.58  Unknown baseline sCr   Likely worsened in setting of severe sepsis, obstructing renal calculus  Received 500cc bolus in ED, additional fluids initially held given respiratory status, elevated BNP and concern for volume overload.     Today creatinine continue to improve    Plan:  Strict I&O  Avoid nephrotoxins and hypotension  Monitor and replete electrolytes  Repeat BMP daily  Continue with gentle hydration    Acute respiratory insufficiency  Assessment & Plan  Pt presented with tachypnea, increased WOB and audible expiratory wheezing, SpO2 88% on room air.  No known pulmonary history, briefly smoked cigarettes many years ago.  Spo2 improved on 2L NC but wheezes persist. Albuterol x1 given in ED with minimal improvement.   LS diminished with wheeze in upper airway, no stridor  CXR - no acute cardiopulmonary abnormalities.  CT chest - No acute findings, no consolidation, pneumothorax, pleural effusion  BNP - 2,737  Initially thought to be volume overload, but now concern for undiagnosed COPD     Plan:  Continue supplemental oxygen for goal SpO2 > 88%, given likely undiagnosed COPD, currently on 1-2L NC, wean as needed  Suspect component of undiagnosed sleep apnea, continue 2 L NC qhs, consider CPAP  Atrovent and Xopenex as needed  TID  Encourage incentive spirometry       Anemia  Assessment & Plan  Lab Results   Component Value Date    HGB 10.0 (L) 02/07/2024    HGB 10.3 (L) 02/06/2024    HGB 10.9 (L) 02/05/2024    Plan as above    Heme positive stool  Assessment & Plan  Lab Results   Component Value Date    HGB 10.0 (L) 02/07/2024    HGB 10.3 (L) 02/06/2024    HGB 10.9 (L) 02/05/2024      - Patient had black, heme positive stools on AM of 1/31/24 during hospitalization   - GI consulted, appreciate recommendations   - Underwent EGD on 1/31/24 to rule out upper GI bleed, which revealed 2 ulcers (stomach and duodenum) but no active bleeding-negative for H. pylori    Plan:  Continue to monitor  Pantoprazole 4 mg twice daily  Repeat EGD in 3 months as outpatient    Electrolyte abnormality  Assessment & Plan  Lab Results   Component Value Date    CALCIUM 7.3 (L) 02/07/2024    CALCIUM 7.3 (L) 02/06/2024   Calcium low at this point, previously corrected to normal levels.  Giving vitamin D and calcium supplementation  Continue to monitor    Hypertension  Assessment & Plan  - Likely undiagnosed chronic hypertension  - Consistently 190s/80s    Plan:  - Started Amlodipine 5 mg daily   - Labetalol 10 mg or Hydralazine 5 mg PRN for SBP > 180                  VTE Pharmacologic Prophylaxis: VTE Score: 8 High Risk (Score >/= 5) - Pharmacological DVT Prophylaxis Ordered: heparin. Sequential Compression Devices Ordered.    Mobility:   Basic Mobility Inpatient Raw Score: 10  JH-HLM Goal: 4: Move to chair/commode  JH-HLM Achieved: 3: Sit at edge of bed  HLM Goal NOT achieved. Continue with multidisciplinary rounding and encourage appropriate mobility to improve upon HLM goals.    Patient Centered Rounds: I performed bedside rounds with nursing staff today.  Discussions with Specialists or Other Care Team Provider: Physician, senior resident, infectious disease    Education and Discussions with Family / Patient: Updated  (daughter) at  bedside.    Current Length of Stay: 9 day(s)  Current Patient Status: Inpatient   Discharge Plan: Anticipate discharge in 24-48 hrs to rehab facility.    Code Status: Level 1 - Full Code    Subjective:   Elevated patient at bedside, patient remained stable from previous exam.  States that she feels well and is happy that she is able to eat.  Denying any current respiratory issues, chest pains, palpitations, nausea, vomiting, fevers, chills.  No change in bowel movements noted by patient.  Attempted to patient for PICC line early in the morning, patient stated that she was not completely understanding the procedure and deferred to her daughter.  Returned later, daughter was present, explained procedure, risks, benefits to patient and daughter, understanding was expressed and daughter was able to teach back.  Consent signed.  Later in the day patient had PICC placed, after placement had series of V. tach secondary to PICC being inserted distally to goal length, given 50 metoprolol tartrate at 150 amnio bolus, PICC was pulled back 3 cm, with resolution of arrhythmia.    Objective:     Vitals:   Temp (24hrs), Av.1 °F (37.3 °C), Min:98.7 °F (37.1 °C), Max:99.4 °F (37.4 °C)    Temp:  [98.7 °F (37.1 °C)-99.4 °F (37.4 °C)] 99.4 °F (37.4 °C)  HR:  [86-89] 86  Resp:  [16-22] 16  BP: (139-165)/(66-85) 139/71  SpO2:  [94 %-96 %] 95 %  Body mass index is 54.7 kg/m².     Input and Output Summary (last 24 hours):     Intake/Output Summary (Last 24 hours) at 2024 1521  Last data filed at 2024 1229  Gross per 24 hour   Intake --   Output 1500 ml   Net -1500 ml       Physical Exam:   Physical Exam  Vitals and nursing note reviewed.   Constitutional:       General: She is not in acute distress.     Appearance: She is well-developed. She is obese.   HENT:      Head: Normocephalic and atraumatic.   Eyes:      Conjunctiva/sclera: Conjunctivae normal.   Cardiovascular:      Rate and Rhythm: Normal rate and regular rhythm.       Heart sounds: Murmur heard.   Pulmonary:      Effort: Pulmonary effort is normal. No respiratory distress.      Breath sounds: Normal breath sounds.      Comments: Auscultated anteriorly due to patient's body habitus  Tory wheezing while speaking      Abdominal:      General: Bowel sounds are normal.      Palpations: Abdomen is soft.      Tenderness: There is no abdominal tenderness.   Musculoskeletal:         General: No swelling.   Skin:     General: Skin is warm and dry.      Findings: Bruising (arms, healing) present.   Neurological:      Mental Status: She is alert and oriented to person, place, and time.   Psychiatric:         Mood and Affect: Mood normal.          Additional Data:     Labs:  Results from last 7 days   Lab Units 02/07/24 0513 02/06/24 0517   WBC Thousand/uL 20.76* 19.71*   HEMOGLOBIN g/dL 10.0* 10.3*   HEMATOCRIT % 31.4* 32.7*   PLATELETS Thousands/uL 298 252   BANDS PCT %  --  4   LYMPHO PCT %  --  6*   MONO PCT %  --  5   EOS PCT %  --  0     Results from last 7 days   Lab Units 02/07/24 0513 02/06/24  0517 02/05/24  0411   SODIUM mmol/L 136   < > 138   POTASSIUM mmol/L 4.2   < > 4.2   CHLORIDE mmol/L 105   < > 106   CO2 mmol/L 25   < > 25   BUN mg/dL 17   < > 21   CREATININE mg/dL 1.14   < > 1.19   ANION GAP mmol/L 6   < > 7   CALCIUM mg/dL 7.3*   < > 7.3*   ALBUMIN g/dL  --   --  2.5*   TOTAL BILIRUBIN mg/dL  --   --  0.36   ALK PHOS U/L  --   --  80   ALT U/L  --   --  9   AST U/L  --   --  15   GLUCOSE RANDOM mg/dL 84   < > 85    < > = values in this interval not displayed.     Results from last 7 days   Lab Units 02/05/24  0411   INR  1.70*                   Lines/Drains:  Invasive Devices       Peripherally Inserted Central Catheter Line  Duration             PICC Line 02/07/24 Right Cephalic vein <1 day              Peripheral Intravenous Line  Duration             Peripheral IV 02/06/24 Right Wrist <1 day                    Central Line:  Goal for removal: N/A - Discharging with  PICC for IV ABX/medications         Telemetry:  Telemetry Orders (From admission, onward)               24 Hour Telemetry Monitoring  Continuous x 24 Hours (Telem)        Question:  Reason for 24 Hour Telemetry  Answer:  PCI/EP study (including pacer and ICD implementation), Cardiac surgery, MI, abnormal cardiac cath, and chest pain- rule out MI                     Telemetry Reviewed: Normal Sinus Rhythm  Indication for Continued Telemetry Use: Awaiting PCI/EP Study/CABG             Imaging: No pertinent imaging reviewed.    Recent Cultures (last 7 days):   Results from last 7 days   Lab Units 02/02/24  0453   BLOOD CULTURE  No Growth After 5 Days.  No Growth After 5 Days.       Last 24 Hours Medication List:   Current Facility-Administered Medications   Medication Dose Route Frequency Provider Last Rate    acetaminophen  975 mg Oral Q8H PRN Luiz Calle MD      albuterol  2 puff Inhalation Q4H PRN Frank Reynolds MD      amLODIPine  10 mg Oral Daily Jefe Tenorio MD      ampicillin  2,000 mg Intravenous Q6H Darren Carrasco MD 2,000 mg (02/07/24 1116)    aspirin  81 mg Oral Daily Luiz Calle MD      atorvastatin  80 mg Oral QPM Luiz Calle MD      calcium carbonate-vitamin D  1 tablet Oral Daily With Breakfast John Omer MD      chlorhexidine  15 mL Mouth/Throat Q12H GUNJAN Luiz Calle MD      clopidogrel  75 mg Oral Daily Luiz Calle MD      diphenhydrAMINE (BENADRYL) 25 mg in sodium chloride 0.9 % 50 mL IVPB  25 mg Intravenous Q6H PRN Luiz Calle MD      diphenhydramine, lidocaine, Al/Mg hydroxide, simethicone  10 mL Swish & Spit Q6H PRN Corey Jo DO      EPINEPHrine PF  0.3 mg Intramuscular Once PRN Luiz Calle MD      furosemide  20 mg Oral Once Yessica Geiger MD      heparin (porcine)  5,000 Units Subcutaneous Q8H GUNJAN Luiz Calle MD      hydrALAZINE  10 mg Intravenous Q8H PRN Luiz  MD Alva      levalbuterol  1.25 mg Nebulization TID PRN John Omer MD      melatonin  6 mg Oral HS Luiz Calle MD      pantoprazole  40 mg Oral BID AC Luiz Calle MD      sodium chloride  2 spray Each Nare Daily Yessica Geiger MD      trimethobenzamide  200 mg Intramuscular Q6H PRN Luiz Calle MD          Today, Patient Was Seen By: John Omer MD    **Please Note: This note may have been constructed using a voice recognition system.**

## 2024-02-07 NOTE — PROGRESS NOTES
Progress Note - Infectious Disease   Loida Duarte 78 y.o. female MRN: [id number]  Unit/Bed#: S -01 Encounter: [id number]      Impression/Plan:  Sepsis. POA. E/b fever to 100.7F, tachycardia, leukocytosis to 27, and lactic acidosis on admission. Most likely I/s/o #2, #3, and #4. CT C/A/P on admission showed a 6mm right distal ureteral calculus with right-sided hydronephrosis with perinephric and periureteral stranding. CT chest did not show any evidence of consolidation. Moderate cardiomegaly noted. COVID/Flu/RSV negative. UA obtained with innumerable WBC, large LE, and moderate blood c/f urinary source. Remains afebrile and HDS. WBC improved from 30 to 22 today. No new symptoms per patient. Urine culture from straight cath resulted positive for E.coli, Ucx from cystoscopy positive for E.faecalis. Now with c/f GI bleed. GI consulted for EGD.  EGD negative for a bleeding source.  Repeat blood cultures negative thus far.  White blood cell count has been steadily increasing of unclear source or significant.  White blood cell count continues to wax and wane but remains elevated suggesting the possibility of a primary hematologic process  Antibiotics as below  Recheck blood cultures as below  Supportive care  Recheck CBC with differential to make sure white count does not continue to increase     Polymicrobial bacteremia. Blood cultures on admission positive both sets (2/2) for GNRs and GPCs in pairs identified as E.coli and E.faecalis. Most likely etiology #3 and #4.  Repeat blood cultures positive in 1 of 2 sets for Enterococcus again but this was prior to the ampicillin, and now the bacteremia is cleared..  Transesophageal echocardiogram without valvular vegetation appreciated although clearly abnormal valves.  Continue intravenous ampicillin through 2/16/2024 to complete 2 weeks from the first negative blood culture  Discontinue ceftriaxone  Follow-up repeat blood cultures to make sure bacteremia  clearing  Follow-up transesophageal echocardiogram results  Recheck CBC with differential and CMP to make sure not developing toxicity  Place PICC line     UTI complicated by pyelonephritis. Likely I/s/o #4. Likely etiology of #2. UA obtained in ED with innumerable WBC, large leukocytes, and moderate blood. CT A/P on admission showed #4 and perinephric and periureteral stranding. Ucx from straight cath resulted positive for E.coli, Ucx from cystoscopy positive for E.faecalis.   Antibiotics as above  No additional urologic workup for now     Right distal ureteral calculus c/b right-sided hydronephrosis. Likely etiology of #3. CT A/P on admission showed a 6mm right UVJ calculus. Now s/p OR on 1/29 for right ureteral stent placement with Urology.   Close urology follow-up.     5. Acute hypoxic respiratory failure. POA. Noted to be 88% on room air. Also with diffuse wheezing on exam. CT chest demonstrated no acute pulmonary abnormalities. Showed cardiomegaly.  Still requiring oxygen support consider pulmonary edema.  Decreased oxygen needs  Monitor respiratory status  Oxygen support     6. Penicillin allergy- resolved. Had an allergy listed as swelling. States she had a rash as a child, but has not had it since. Has not seen a doctor in 50 years. Passed an amoxicillin challenge on 1/30. Tolerating ampicillin without difficulty.   No longer a true allergy      7. Morbid obesity. BMI noted to be 51 on admission. May affect antibiotic dosing.    8.  Persistent leukocytosis.  With the patient clinically improved and the white count staying elevated, consideration for the possibility of a primary hematologic process that has gone undiagnosed.  The patient has not had labs done in decades as she has not seen a physician in 50 years.  Consider hematology oncology evaluation    With the primary service the plan to continue the intravenous ampicillin and discontinue the ceftriaxone.  They agree with the  plan    Antibiotics:  Ampicillin 8  Ceftriaxone 7  Antibiotics 11  Negative blood cultures 5    Subjective:  Patient has no fever, chills, sweats; no nausea, vomiting, diarrhea; no cough, shortness of breath; no pain. No new symptoms.  She is feeling better overall.    Objective:  Vitals:  Temp:  [98.7 °F (37.1 °C)-99.2 °F (37.3 °C)] 98.7 °F (37.1 °C)  HR:  [86-92] 86  Resp:  [19-22] 19  BP: (150-169)/(66-85) 150/69  SpO2:  [94 %-96 %] 96 %  Temp (24hrs), Av °F (37.2 °C), Min:98.7 °F (37.1 °C), Max:99.2 °F (37.3 °C)  Current: Temperature: 98.7 °F (37.1 °C)    Physical Exam:   General Appearance:  Alert, interactive, nontoxic, no acute distress.   Throat: Oropharynx moist without lesions.    Lungs:   Decreased breath sounds bilaterally; no wheezes, rhonchi or rales; respirations unlabored   Heart:  RRR; no murmur, rub or gallop   Abdomen:   Soft, non-tender, non-distended, positive bowel sounds.     Extremities: No clubbing, cyanosis or edema   Skin: No new rashes or lesions. No draining wounds noted.       Labs, Imaging, & Other studies:   All pertinent labs and imaging studies were personally reviewed  Results from last 7 days   Lab Units 24  0513 24  0517 24  0411   WBC Thousand/uL 20.76* 19.71* 20.68*   HEMOGLOBIN g/dL 10.0* 10.3* 10.9*   PLATELETS Thousands/uL 298 252 214     Results from last 7 days   Lab Units 24  0513 24  0517 24  0411 24  0458 24  0523   SODIUM mmol/L 136 137 138   < > 136   POTASSIUM mmol/L 4.2 4.1 4.2   < > 4.1   CHLORIDE mmol/L 105 107 106   < > 104   CO2 mmol/L 25 22 25   < > 25   BUN mg/dL 17 18 21   < > 31*   CREATININE mg/dL 1.14 1.13 1.19   < > 1.38*   EGFR ml/min/1.73sq m 45 46 43   < > 36   CALCIUM mg/dL 7.3* 7.3* 7.3*   < > 7.2*   AST U/L  --   --  15  --  20   ALT U/L  --   --  9  --  11   ALK PHOS U/L  --   --  80  --  74    < > = values in this interval not displayed.     Results from last 7 days   Lab Units 24  6909    BLOOD CULTURE  No Growth After 4 Days.  No Growth After 4 Days.             Results from last 7 days   Lab Units 02/04/24  0458   FERRITIN ng/mL 193

## 2024-02-07 NOTE — PHYSICAL THERAPY NOTE
Physical Therapy Cancellation Note               02/07/24 1529   PT Last Visit   PT Visit Date 02/07/24   Note Type   Note Type Cancelled Session   Cancel Reasons Refusal   Assessment   Assessment pt is refusing PT intervention this afternoon stating she is to tired. pt educated on the importance of participating in PT intervention on a daily basis but pt continues to refuse. PT will follow and attempt to see pt when PT schedule will allow     Julito Bui

## 2024-02-07 NOTE — ASSESSMENT & PLAN NOTE
Lab Results   Component Value Date    CREATININE 1.14 02/07/2024    CREATININE 1.13 02/06/2024    CREATININE 1.19 02/05/2024      Initial sCr - 2.58  Unknown baseline sCr   Likely worsened in setting of severe sepsis, obstructing renal calculus  Received 500cc bolus in ED, additional fluids initially held given respiratory status, elevated BNP and concern for volume overload.     Today creatinine continue to improve    Plan:  Strict I&O  Avoid nephrotoxins and hypotension  Monitor and replete electrolytes  Repeat BMP daily  Continue with gentle hydration

## 2024-02-07 NOTE — PLAN OF CARE
Problem: OCCUPATIONAL THERAPY ADULT  Goal: Performs self-care activities at highest level of function for planned discharge setting.  See evaluation for individualized goals.  Description: Treatment Interventions: ADL retraining, Functional transfer training, Endurance training, Cognitive reorientation, Patient/family training, Equipment evaluation/education, Compensatory technique education, Energy conservation, Activityengagement          See flowsheet documentation for full assessment, interventions and recommendations.   Outcome: Not Progressing  Note: Limitation: Decreased ADL status, Decreased UE strength, Decreased Safe judgement during ADL, Decreased cognition, Decreased endurance, Decreased self-care trans, Decreased high-level ADLs (impaired pain, balance, fxnl mobility, act kellee, fxnl reach, trunk control, standing kellee, strength, fxnl sitting balance, fxnl sitting kellee, attention to task, direction following, safety awareness, insight, problem solving, learning new tasks)  Prognosis: Good  Assessment: Pt agreeable to OT treatment session, upon arrival patient was found supine in bed. Patient participated in skilled OT interventions to address ADL tasks, functional transfers, and overall activity tolerance and participation. In comparison to previous session, patient with regression in active participation in session and activity tolerance this session. Pt required moderate assistance for UB dressing and maximal assistance for hair care supine in bed. Pt was only able to tolerate sitting EOB for 5 minutes and was limited by pain, fatigue and lack of motivation. Pt goals updated to reflect current performance. Patient to benefit from continued IPOT treatment to address deficits as defined above and maximize level of functional independence with ADLs and functional mobility.     Rehab Resource Intensity Level, OT: II (Moderate Resource Intensity)     TEO Perera, OTR/L  [place] [id number]  [place]  License [id number]

## 2024-02-07 NOTE — ASSESSMENT & PLAN NOTE
Lab Results   Component Value Date    HGB 10.0 (L) 02/07/2024    HGB 10.3 (L) 02/06/2024    HGB 10.9 (L) 02/05/2024    Plan as above

## 2024-02-07 NOTE — PLAN OF CARE
Problem: Potential for Falls  Goal: Patient will remain free of falls  Description: INTERVENTIONS:  - Educate patient/family on patient safety including physical limitations  - Instruct patient to call for assistance with activity   - Consult OT/PT to assist with strengthening/mobility   - Keep Call bell within reach  - Keep bed low and locked with side rails adjusted as appropriate  - Keep care items and personal belongings within reach  - Initiate and maintain comfort rounds  - Make Fall Risk Sign visible to staff  - Offer Toileting every 2 Hours, in advance of need  - Initiate/Maintain bed alarm  - Obtain necessary fall risk management equipment  - Apply yellow socks and bracelet for high fall risk patients  - Consider moving patient to room near nurses station  Outcome: Progressing     Problem: Prexisting or High Potential for Compromised Skin Integrity  Goal: Skin integrity is maintained or improved  Description: INTERVENTIONS:  - Identify patients at risk for skin breakdown  - Assess and monitor skin integrity  - Assess and monitor nutrition and hydration status  - Monitor labs   - Assess for incontinence   - Turn and reposition patient  - Assist with mobility/ambulation  - Relieve pressure over bony prominences  - Avoid friction and shearing  - Provide appropriate hygiene as needed including keeping skin clean and dry  - Evaluate need for skin moisturizer/barrier cream  - Collaborate with interdisciplinary team   - Patient/family teaching  - Consider wound care consult   Outcome: Progressing     Problem: Nutrition/Hydration-ADULT  Goal: Nutrient/Hydration intake appropriate for improving, restoring or maintaining nutritional needs  Description: Monitor and assess patient's nutrition/hydration status for malnutrition. Collaborate with interdisciplinary team and initiate plan and interventions as ordered.  Monitor patient's weight and dietary intake as ordered or per policy. Utilize nutrition screening tool  and intervene as necessary. Determine patient's food preferences and provide high-protein, high-caloric foods as appropriate.     INTERVENTIONS:  - Monitor oral intake, urinary output, labs, and treatment plans  - Assess nutrition and hydration status and recommend course of action  - Evaluate amount of meals eaten  - Assist patient with eating if necessary   - Allow adequate time for meals  - Recommend/ encourage appropriate diets, oral nutritional supplements, and vitamin/mineral supplements  - Order, calculate, and assess calorie counts as needed  - Recommend, monitor, and adjust tube feedings and TPN/PPN based on assessed needs  - Assess need for intravenous fluids  - Provide specific nutrition/hydration education as appropriate  - Include patient/family/caregiver in decisions related to nutrition  Outcome: Progressing     Problem: PAIN - ADULT  Goal: Verbalizes/displays adequate comfort level or baseline comfort level  Description: Interventions:  - Encourage patient to monitor pain and request assistance  - Assess pain using appropriate pain scale  - Administer analgesics based on type and severity of pain and evaluate response  - Implement non-pharmacological measures as appropriate and evaluate response  - Consider cultural and social influences on pain and pain management  - Notify physician/advanced practitioner if interventions unsuccessful or patient reports new pain  Outcome: Progressing     Problem: INFECTION - ADULT  Goal: Absence or prevention of progression during hospitalization  Description: INTERVENTIONS:  - Assess and monitor for signs and symptoms of infection  - Monitor lab/diagnostic results  - Monitor all insertion sites, i.e. indwelling lines, tubes, and drains  - Monitor endotracheal if appropriate and nasal secretions for changes in amount and color  - [place] appropriate cooling/warming therapies per order  - Administer medications as ordered  - Instruct and encourage patient and  family to use good hand hygiene technique  - Identify and instruct in appropriate isolation precautions for identified infection/condition  Outcome: Progressing  Goal: Absence of fever/infection during neutropenic period  Description: INTERVENTIONS:  - Monitor WBC    Outcome: Progressing     Problem: SAFETY ADULT  Goal: Patient will remain free of falls  Description: INTERVENTIONS:  - Educate patient/family on patient safety including physical limitations  - Instruct patient to call for assistance with activity   - Consult OT/PT to assist with strengthening/mobility   - Keep Call bell within reach  - Keep bed low and locked with side rails adjusted as appropriate  - Keep care items and personal belongings within reach  - Initiate and maintain comfort rounds  - Make Fall Risk Sign visible to staff  - Offer Toileting every 2 Hours, in advance of need  - Initiate/Maintain bed alarm  - Obtain necessary fall risk management equipment  - Apply yellow socks and bracelet for high fall risk patients  - Consider moving patient to room near nurses station  Outcome: Progressing  Goal: Maintain or return to baseline ADL function  Description: INTERVENTIONS:  -  Assess patient's ability to carry out ADLs; assess patient's baseline for ADL function and identify physical deficits which impact ability to perform ADLs (bathing, care of mouth/teeth, toileting, grooming, dressing, etc.)  - Assess/evaluate cause of self-care deficits   - Assess range of motion  - Assess patient's mobility; develop plan if impaired  - Assess patient's need for assistive devices and provide as appropriate  - Encourage maximum independence but intervene and supervise when necessary  - Involve family in performance of ADLs  - Assess for home care needs following discharge   - Consider OT consult to assist with ADL evaluation and planning for discharge  - Provide patient education as appropriate  Outcome: Progressing  Goal: Maintains/Returns to pre admission  functional level  Description: INTERVENTIONS:  - Perform AM-PAC 6 Click Basic Mobility/ Daily Activity assessment daily.  - Set and communicate daily mobility goal to care team and patient/family/caregiver.   - Collaborate with rehabilitation services on mobility goals if consulted  - Perform Range of Motion 4 times a day.  - Reposition patient every 2 hours.  - Dangle patient 3 times a day  - Stand patient 3 times a day  - Ambulate patient 3 times a day  - Out of bed to chair 3 times a day   - Out of bed for meals 3 times a day  - Out of bed for toileting  - Record patient progress and toleration of activity level   Outcome: Progressing     Problem: DISCHARGE PLANNING  Goal: Discharge to home or other facility with appropriate resources  Description: INTERVENTIONS:  - Identify barriers to discharge w/patient and caregiver  - Arrange for needed discharge resources and transportation as appropriate  - Identify discharge learning needs (meds, wound care, etc.)  - Arrange for interpretive services to assist at discharge as needed  - Refer to Case Management Department for coordinating discharge planning if the patient needs post-hospital services based on physician/advanced practitioner order or complex needs related to functional status, cognitive ability, or social support system  Outcome: Progressing     Problem: Knowledge Deficit  Goal: Patient/family/caregiver demonstrates understanding of disease process, treatment plan, medications, and discharge instructions  Description: Complete learning assessment and assess knowledge base.  Interventions:  - Provide teaching at level of understanding  - Provide teaching via preferred learning methods  Outcome: Progressing     Problem: GENITOURINARY - ADULT  Goal: Maintains or returns to baseline urinary function  Description: INTERVENTIONS:  - Assess urinary function  - Encourage oral fluids to ensure adequate hydration if ordered  - Administer IV fluids as ordered to  ensure adequate hydration  - Administer ordered medications as needed  - Offer frequent toileting  - Follow urinary retention protocol if ordered  Outcome: Progressing  Goal: Absence of urinary retention  Description: INTERVENTIONS:  - Assess patient’s ability to void and empty bladder  - Monitor I/O  - Bladder scan as needed  - Discuss with physician/AP medications to alleviate retention as needed  - Discuss catheterization for long term situations as appropriate  Outcome: Progressing  Goal: Urinary catheter remains patent  Description: INTERVENTIONS:  - Assess patency of urinary catheter  - If patient has a chronic navas, consider changing catheter if non-functioning  - Follow guidelines for intermittent irrigation of non-functioning urinary catheter  Outcome: Progressing     Problem: CARDIOVASCULAR - ADULT  Goal: Maintains optimal cardiac output and hemodynamic stability  Description: INTERVENTIONS:  - Monitor I/O, vital signs and rhythm  - Monitor for S/S and trends of decreased cardiac output  - Administer and titrate ordered vasoactive medications to optimize hemodynamic stability  - Assess quality of pulses, skin color and temperature  - Assess for signs of decreased coronary artery perfusion  - Instruct patient to report change in severity of symptoms  Outcome: Progressing  Goal: Absence of cardiac dysrhythmias or at baseline rhythm  Description: INTERVENTIONS:  - Continuous cardiac monitoring, vital signs, obtain 12 lead EKG if ordered  - Administer antiarrhythmic and heart rate control medications as ordered  - Monitor electrolytes and administer replacement therapy as ordered  Outcome: Progressing

## 2024-02-07 NOTE — OCCUPATIONAL THERAPY NOTE
Occupational Therapy Progress Note     Patient Name: Loida Duarte  Today's Date: 2/7/2024  Problem List  Principal Problem:    Severe sepsis (HCC)  Active Problems:    Right ureteral calculus    Acute respiratory insufficiency    RAKEL (acute kidney injury) (HCC)    Elevated troponin    Obstructive pyelonephritis    Heme positive stool    Hypertension    Anemia    Electrolyte abnormality        02/07/24 1139   OT Last Visit   OT Visit Date 02/07/24   Note Type   Note Type Treatment   Pain Assessment   Pain Assessment Tool FLACC   Pain Location/Orientation Orientation: Bilateral;Location: Leg;Location: Buttocks  (also w/ intermittent sharp pain under L breast (RN notified))   Pain Onset/Description Frequency: Intermittent;Descriptor: Sharp   Patient's Stated Pain Goal No pain   Hospital Pain Intervention(s) Repositioned;Ambulation/increased activity;Emotional support   Pain Rating: FLACC (Rest) - Face 0   Pain Rating: FLACC (Rest) - Legs 0   Pain Rating: FLACC (Rest) - Activity 0   Pain Rating: FLACC (Rest) - Cry 0   Pain Rating: FLACC (Rest) - Consolability 0   Score: FLACC (Rest) 0   Pain Rating: FLACC (Activity) - Face 1   Pain Rating: FLACC (Activity) - Legs 1   Pain Rating: FLACC (Activity) - Activity 1   Pain Rating: FLACC (Activity) - Cry 1   Pain Rating: FLACC (Activity) - Consolability 1   Score: FLACC (Activity) 5   Restrictions/Precautions   Weight Bearing Precautions Per Order No   Other Precautions Cognitive;Chair Alarm;Bed Alarm;Multiple lines;O2;Telemetry;Pain;Fall Risk  (2L O2 yosef BARLOW)   Lifestyle   Autonomy PTA pt living with  in [address]+ apartment, pt (I) with ADLs and IADLS, (+)falls, (+)drives, use of rollator at baseline   Reciprocal Relationships supportive caregiver 3x/wk to assist with care for    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 Limited use of RUE (due to IV per daughter), shima to briefly A w/ scrubbing in shower cap however fatigued easily   UB Dressing Assistance 3  Moderate Assistance   UB Dressing Deficit Setup;Verbal cueing;Supervision/safety;Increased time to complete;Thread RUE;Thread LUE;Pull around back   UB Dressing Comments supine in bed, cues for sequencing   Bed Mobility   Rolling L 4  Minimal assistance   Additional items Assist x 1;Bedrails;Increased time required;Verbal cues   Supine to Sit 2  Maximal assistance   Additional items Assist x 1;HOB elevated;Bedrails;Increased time required;Verbal cues;LE management   Sit to Supine 2  Maximal assistance   Additional items Assist x 2;Increased time required;Verbal cues;LE management   Additional Comments Able to sit EOB w/ variable min/mod A x 1 for approx 5 minutes. Increased SOB, somewhat agitated by pain under L breast (despite multiple checks to report no forgein objects/wounds) and requesitng return to supine stating \"I just want to lay down\"   Transfers   Sit to Stand Unable to assess  (pt refusing all attempts at OOB mobility this session)   Subjective   Subjective \"I just want to die\" \"I don't care what you do just lay me down\"   Cognition   Overall Cognitive Status Impaired   Arousal/Participation Alert;Cooperative  (w/ extensive encouragement)   Attention Attends with cues to redirect   Orientation Level Oriented to person;Oriented to place   Memory Decreased recall of recent events;Decreased recall of precautions   Following Commands Follows one step commands with increased time or repetition   Comments Distractible, somewhat agitated " "(upset by pain under breast and blaming therapist). Required extensive encouragement to participate in session and apply effort. Intermittently making statements including \"Just let me die\" (ADIA Ball notified).   Activity Tolerance   Activity Tolerance Patient limited by fatigue;Patient limited by pain  (SOB, generalized weakness/fatigue)   Medical Staff Made Aware Spoke to ADIA Trevino   Assessment   Assessment Pt agreeable to OT treatment session, upon arrival patient was found supine in bed. Patient participated in skilled OT interventions to address ADL tasks, functional transfers, and overall activity tolerance and participation. In comparison to previous session, patient with regression in active participation in session and activity tolerance this session. Pt required moderate assistance for UB dressing and maximal assistance for hair care supine in bed. Pt was only able to tolerate sitting EOB for 5 minutes and was limited by pain, fatigue and lack of motivation. Pt goals updated to reflect current performance. Patient to benefit from continued IPOT treatment to address deficits as defined above and maximize level of functional independence with ADLs and functional mobility.   Plan   Treatment Interventions ADL retraining;Functional transfer training;UE strengthening/ROM;Endurance training;Cognitive reorientation;Patient/family training;Equipment evaluation/education;Compensatory technique education;Continued evaluation;Energy conservation;Activityengagement   Goal Expiration Date 02/17/24  (goals extended by 10 days, please see updated goals below to reflect pt current functional status)   OT Treatment Day 2   OT Frequency 3-5x/wk   Discharge Recommendation   Rehab Resource Intensity Level, OT II (Moderate Resource Intensity)   Additional Comments  The patient's raw score on the AM-PAC Daily Activity Inpatient Short Form is 11. A raw score of less than 19 suggests the patient may benefit from discharge " to post-acute rehabilitation services. Please refer to the recommendation of the Occupational Therapist for safe discharge planning.   AM-PAC Daily Activity Inpatient   Lower Body Dressing 1   Bathing 2   Toileting 1   Upper Body Dressing 2   Grooming 2   Eating 3   Daily Activity Raw Score 11   Daily Activity Standardized Score (Calc for Raw Score >=11) 29.04   AM-PAC Applied Cognition Inpatient   Following a Speech/Presentation 3   Understanding Ordinary Conversation 3   Taking Medications 2   Remembering Where Things Are Placed or Put Away 3   Remembering List of 4-5 Errands 2   Taking Care of Complicated Tasks 2   Applied Cognition Raw Score 15   Applied Cognition Standardized Score 33.54     GOALS:      -Patient will perform grooming tasks sitting EOB with overall min A in order to increase overall independence w/ self-care (PROGRESSING)     -Patient will be S with UB dressing using AE and AD as needed in order to increase (I) with ADLs (PROGRESSING)     -Patient will be S with UB bathing using AE and AD as needed in order to increase (I) with ADLs     -Patient will be Mod A  with LB dressing with use of AE and AD as needed in order to increase (I) with ADLs     -Patient will be Mod A  with LB bathing with use of AE and AD as needed in order to increase (I) with ADLs     -Patient will complete toileting w/ Mod A  w/ G hygiene/thoroughness in order to reduce caregiver burden     -Patient will demonstrate MIN A with bed mobility for ability to manage own comfort and initiate OOB tasks.      -Patient will ENGAGE and perform functional transfers with mod A x 2 to/from all surfaces using DME as needed in order to increase (I) with functional tasks NOT PROGRESSING     -Patient will engage in ongoing cognitive assessment in order to assist with safe discharge planning/recommendations.     -Patient will follow multi-step instructions with no VC in order to safely complete functional tasks PROGRESSING      -Patient will  independently integrate one pacing strategy into morning ADL's.     -Patient will demonstrate PLB techniques during ADL tasks with min VC PROGRESSING    TEO Perera, OTR/L  [place] License [id number]  [place] License [id number]

## 2024-02-07 NOTE — PROGRESS NOTES
NEPHROLOGY HOSPITAL PROGRESS NOTE   Loida Duarte 78 y.o. female MRN: [id number]  Unit/Bed#: S -01 Encounter: [id number]  Reason for Consult: Acute kidney injury    ASSESSMENT and PLAN:  78-year-old female who presented with sepsis and found to have E. coli/Enterococcus bacteremia, non-ST elevation MI and heme positive stool.  Nephrology was consulted for management of acute kidney injury.    Acute kidney injury (POA):  Admitted on 1/28/2024, creatinine 2.58.  Peak creatinine 2.89 on 1/30  Etiology: Prerenal azotemia plus ATN due to bacteremia plus obstructive uropathy (right-sided hydronephrosis)  Acute kidney injury resolved.  Creatinine 1.14  Renal function has been stable.  We will sign off.  Please call if there are any questions or concerns.    Hypertension:  Blood pressure remains elevated, above goal.  Currently on amlodipine 10 mg daily.  Echocardiogram: Ejection fraction 65% with grade 1 diastolic dysfunction.  Wall thickness moderately increased.  Right systolic function normal.  Mild mitral regurg, mild tricuspid regurg.  Recommendations: Consider the addition of carvedilol or metoprolol.    Sepsis due to polymicrobial bacteremia  ID following  LUC without valvular vegetation  On ampicillin through 2/16  Follow-up blood cultures    UTI with pyelonephritis:  Cystoscopy cultures positive for E faecalis  Management per ID    Obstructive uropathy/right hydronephrosis due to right distal ureteral calculus.    Status post right ureteral stent on 1/29.  Urology follow-up    Elevated troponins:  Radiology following  There was a tentative plan for cardiac cath which was aborted since there were no current symptoms.  Overall weakened state with multiple issues.    DISPOSITION:  Stable from a nephrology standpoint.  Will sign off.  Thank you for allowing us to help take care of Loida.  Please let us know if there are any questions or concerns.    SUBJECTIVE / 24H INTERVAL HISTORY:  Slept better last night  therefore felt a little bit perkier today.  Her daughter felt that her color was better.  She offers no complaints of shortness of breath, pain.  No nausea, vomiting.  She is weak and easily fatigued.    OBJECTIVE:  Current Weight: Weight - Scale: 115 kg (252 lb 13.9 oz)  Vitals:    02/06/24 2113 02/07/24 0019 02/07/24 0550 02/07/24 0640   BP: 165/66 159/85  150/69   BP Location: Right arm   Right arm   Pulse: 89   86   Resp: 22   19   Temp:  99.2 °F (37.3 °C)  98.7 °F (37.1 °C)   TempSrc:  Oral  Oral   SpO2: 94%   96%   Weight:   115 kg (252 lb 13.9 oz)    Height:           Intake/Output Summary (Last 24 hours) at 2/7/2024 0942  Last data filed at 2/7/2024 0534  Gross per 24 hour   Intake --   Output 750 ml   Net -750 ml     General: Ill-appearing female in no acute distress.  Skin: no rash  Eyes: anicteric sclera  ENT: moist mucous membrane  Neck: supple.  No JVD appreciated  Chest: CTA b/l, no ronchii, no wheeze, no rubs, no rales.  Normal effort  CVS: s1s2, no murmur, no gallop, no rub.  Normal rate regular rhythm  Abdomen: soft, nontender, nl sounds  Extremities: no edema LE b/l  :  navas  Neuro: AAOX3  Psych: normal affect   Medications:    Current Facility-Administered Medications:     acetaminophen (TYLENOL) tablet 975 mg, 975 mg, Oral, Q8H PRN, Luiz Calle MD, 975 mg at 02/05/24 2237    albuterol (PROVENTIL HFA,VENTOLIN HFA) inhaler 2 puff, 2 puff, Inhalation, Q4H PRN, Frank Reynolds MD, 2 puff at 02/05/24 0724    amLODIPine (NORVASC) tablet 10 mg, 10 mg, Oral, Daily, Jefe Tenorio MD, 10 mg at 02/07/24 0833    ampicillin (OMNIPEN) 2,000 mg in sodium chloride 0.9 % 100 mL IVPB, 2,000 mg, Intravenous, Q6H, Darren Carrasco MD, Last Rate: 200 mL/hr at 02/07/24 0426, 2,000 mg at 02/07/24 0426    aspirin (ECOTRIN LOW STRENGTH) EC tablet 81 mg, 81 mg, Oral, Daily, Luiz Calle MD, 81 mg at 02/07/24 0833    atorvastatin (LIPITOR) tablet 80 mg, 80 mg, Oral, QPM, Luiz  MD Alva, 80 mg at 02/06/24 1701    calcium carbonate-vitamin D 500 mg-5 mcg tablet 1 tablet, 1 tablet, Oral, Daily With Breakfast, John Omer MD, 1 tablet at 02/07/24 0835    chlorhexidine (PERIDEX) 0.12 % oral rinse 15 mL, 15 mL, Mouth/Throat, Q12H GUNJAN, Luiz Calle MD, 15 mL at 02/07/24 0833    clopidogrel (PLAVIX) tablet 75 mg, 75 mg, Oral, Daily, Luiz Calle MD, 75 mg at 02/07/24 0833    diphenhydrAMINE (BENADRYL) 25 mg in sodium chloride 0.9 % 50 mL IVPB, 25 mg, Intravenous, Q6H PRN, Luiz Calle MD    diphenhydramine, lidocaine, Al/Mg hydroxide, simethicone (Magic Mouthwash) oral solution 10 mL, 10 mL, Swish & Spit, Q6H PRN, Corey Jo , 10 mL at 02/05/24 1914    EPINEPHrine PF (ADRENALIN) 1 mg/mL injection 0.3 mg, 0.3 mg, Intramuscular, Once PRN, Luiz Calle MD    heparin (porcine) subcutaneous injection 5,000 Units, 5,000 Units, Subcutaneous, Q8H GUNJAN, Luiz Calle MD, 5,000 Units at 02/07/24 0532    hydrALAZINE (APRESOLINE) injection 10 mg, 10 mg, Intravenous, Q8H PRN, Luiz Calle MD, 10 mg at 02/04/24 0807    levalbuterol (XOPENEX) inhalation solution 1.25 mg, 1.25 mg, Nebulization, TID PRN, John Omer MD    magnesium sulfate 2 g/50 mL IVPB (premix) 2 g, 2 g, Intravenous, Once, John Omer MD, Last Rate: 25 mL/hr at 02/07/24 0821, 2 g at 02/07/24 0821    melatonin tablet 6 mg, 6 mg, Oral, HS, Luiz Calle MD, 6 mg at 02/06/24 2108    pantoprazole (PROTONIX) EC tablet 40 mg, 40 mg, Oral, BID AC, Luiz Calle MD, 40 mg at 02/07/24 0533    sodium chloride (OCEAN) 0.65 % nasal spray 2 spray, 2 spray, Each Nare, Daily, Yessica Geiger MD, 2 spray at 02/06/24 0826    trimethobenzamide (TIGAN) IM injection 200 mg, 200 mg, Intramuscular, Q6H PRN, Luiz Calle MD, 200 mg at 01/30/24 6397    Laboratory Results:  Results from last 7 days   Lab Units 02/07/24  0557  02/06/24  0517 02/05/24  0411 02/04/24  0458 02/03/24  0523 02/02/24  0503 02/01/24  0502   WBC Thousand/uL 20.76* 19.71* 20.68* 16.24* 13.71* 12.13* 15.03*   HEMOGLOBIN g/dL 10.0* 10.3* 10.9* 11.3* 11.2* 10.7* 11.8   HEMATOCRIT % 31.4* 32.7* 34.3* 35.5 34.6* 32.8* 36.6   PLATELETS Thousands/uL 298 252 214 182 126* 99* 86*   POTASSIUM mmol/L 4.2 4.1 4.2 3.7 4.1 3.9 4.2   CHLORIDE mmol/L 105 107 106 107 104 105 105   CO2 mmol/L 25 22 25 21 25 22 21   BUN mg/dL 17 18 21 25 31* 43* 52*   CREATININE mg/dL 1.14 1.13 1.19 1.27 1.38* 1.56* 1.84*   CALCIUM mg/dL 7.3* 7.3* 7.3* 7.4* 7.2* 7.6* 7.8*   MAGNESIUM mg/dL 1.7*  --  2.1 1.9  --  2.1 2.2   PHOSPHORUS mg/dL  --   --   --   --   --  2.7 2.8

## 2024-02-07 NOTE — CONSULTS
Oncology Consult Note  Loida Duarte 78 y.o. female MRN: [id number]  Unit/Bed#: S -01 Encounter: [id number]      Presenting Complaint: Leukocytosis    History of Presenting Illness: Loida Duarte is seen for initial consultation 1/28/2024 at the referral of No ref. provider found   She is morbidly obese 78-year-old female without previous healthcare, the patient was admitted with fatigue, shortness of breath, acute respiratory insufficiency, elevated troponin, acute kidney injury we do not have any previous labs, as workup she was found to have E. coli in the urine sensitive to every antibiotics on the list    When she was admitted on 1/31/2024 WBC 22.7, subsequently they went down and currently 19.71, hemoglobin was 12.3 currently 10.3, platelets 92,000 and currently 252,000    It was hard to take history from the patient, her daughter Lillie answered all the questions, the patient denied any fever or chills or rash  Review of Systems - As stated in the HPI otherwise the fourteen point review of systems was negative.    History reviewed. No pertinent past medical history.    Social History     Socioeconomic History    Marital status: /Civil Union     Spouse name: None    Number of children: None    Years of education: None    Highest education level: None   Occupational History    None   Tobacco Use    Smoking status: Former     Types: Cigarettes    Smokeless tobacco: Never   Substance and Sexual Activity    Alcohol use: Never    Drug use: Never    Sexual activity: None   Other Topics Concern    None   Social History Narrative    None     Social Determinants of Health     Financial Resource Strain: Not on file   Food Insecurity: No Food Insecurity (1/29/2024)    Hunger Vital Sign     Worried About Running Out of Food in the Last Year: Never true     Ran Out of Food in the Last Year: Never true   Transportation Needs: Unknown (1/29/2024)    PRAPARE - Transportation     Lack of Transportation (Medical):  No     Lack of Transportation (Non-Medical): Not on file   Physical Activity: Not on file   Stress: Not on file   Social Connections: Not on file   Intimate Partner Violence: Not on file   Housing Stability: Unknown (1/29/2024)    Housing Stability Vital Sign     Unable to Pay for Housing in the Last Year: No     Number of Places Lived in the Last Year: Not on file     Unstable Housing in the Last Year: No       History reviewed. No pertinent family history.    Allergies   Allergen Reactions    Penicillins Swelling     tolerated Amoxicillin oral challenge           Current Facility-Administered Medications:     acetaminophen (TYLENOL) tablet 975 mg, 975 mg, Oral, Q8H PRN, Luiz Calle MD, 975 mg at 02/05/24 2237    albuterol (PROVENTIL HFA,VENTOLIN HFA) inhaler 2 puff, 2 puff, Inhalation, Q4H PRN, Frank Reynolds MD, 2 puff at 02/07/24 1416    amLODIPine (NORVASC) tablet 10 mg, 10 mg, Oral, Daily, Jefe Tenorio MD, 10 mg at 02/07/24 0833    ampicillin (OMNIPEN) 2,000 mg in sodium chloride 0.9 % 100 mL IVPB, 2,000 mg, Intravenous, Q6H, Darren Carrasco MD, Last Rate: 200 mL/hr at 02/07/24 1116, 2,000 mg at 02/07/24 1116    aspirin (ECOTRIN LOW STRENGTH) EC tablet 81 mg, 81 mg, Oral, Daily, Luiz Calle MD, 81 mg at 02/07/24 0833    atorvastatin (LIPITOR) tablet 80 mg, 80 mg, Oral, QPM, Luiz Calle MD, 80 mg at 02/06/24 1701    calcium carbonate-vitamin D 500 mg-5 mcg tablet 1 tablet, 1 tablet, Oral, Daily With Breakfast, John Omer MD, 1 tablet at 02/07/24 0835    chlorhexidine (PERIDEX) 0.12 % oral rinse 15 mL, 15 mL, Mouth/Throat, Q12H GUNJAN, Luiz Calle MD, 15 mL at 02/07/24 0833    clopidogrel (PLAVIX) tablet 75 mg, 75 mg, Oral, Daily, Luiz Calle MD, 75 mg at 02/07/24 0872    diphenhydrAMINE (BENADRYL) 25 mg in sodium chloride 0.9 % 50 mL IVPB, 25 mg, Intravenous, Q6H PRN, Luiz Calle MD    diphenhydramine, lidocaine, Al/Mg hydroxide,  "simethicone (Magic Mouthwash) oral solution 10 mL, 10 mL, Swish & Spit, Q6H PRN, Corey Jo, DO, 10 mL at 02/05/24 1914    EPINEPHrine PF (ADRENALIN) 1 mg/mL injection 0.3 mg, 0.3 mg, Intramuscular, Once PRN, Luiz Calle MD    heparin (porcine) subcutaneous injection 5,000 Units, 5,000 Units, Subcutaneous, Q8H GUNJAN, Luiz Calle MD, 5,000 Units at 02/07/24 1418    hydrALAZINE (APRESOLINE) injection 10 mg, 10 mg, Intravenous, Q8H PRN, Luiz Calle MD, 10 mg at 02/04/24 0807    levalbuterol (XOPENEX) inhalation solution 1.25 mg, 1.25 mg, Nebulization, TID PRN, John Omer MD    melatonin tablet 6 mg, 6 mg, Oral, HS, Luiz Calle MD, 6 mg at 02/06/24 2108    pantoprazole (PROTONIX) EC tablet 40 mg, 40 mg, Oral, BID AC, Luiz Calle MD, 40 mg at 02/07/24 1602    sodium chloride (OCEAN) 0.65 % nasal spray 2 spray, 2 spray, Each Nare, Daily, Yessica Geiger MD, 2 spray at 02/07/24 1021    trimethobenzamide (TIGAN) IM injection 200 mg, 200 mg, Intramuscular, Q6H PRN, Luiz Calle MD, 200 mg at 01/30/24 1958      /71   Pulse 86   Temp 99.4 °F (37.4 °C)   Resp 16   Ht 4' 9.01\" (1.448 m)   Wt 115 kg (252 lb 13.9 oz)   SpO2 95%   BMI 54.70 kg/m²       General Appearance:    Alert, oriented, very obese she is on oxygen       Eyes:     Ears:     Nose:    Throat:    Neck:        Lungs:      Chest Wall:      Heart:         Abdomen:              Extremities:        Skin:    Lymph nodes:    Neurologic:        Recent Results (from the past 48 hour(s))   CBC and differential    Collection Time: 02/06/24  5:17 AM   Result Value Ref Range    WBC 19.71 (H) 4.31 - 10.16 Thousand/uL    RBC 3.28 (L) 3.81 - 5.12 Million/uL    Hemoglobin 10.3 (L) 11.5 - 15.4 g/dL    Hematocrit 32.7 (L) 34.8 - 46.1 %     (H) 82 - 98 fL    MCH 31.4 26.8 - 34.3 pg    MCHC 31.5 31.4 - 37.4 g/dL    RDW 14.8 11.6 - 15.1 %    MPV 10.6 8.9 - 12.7 fL    Platelets 252 " 149 - 390 Thousands/uL   Basic metabolic panel    Collection Time: 02/06/24  5:17 AM   Result Value Ref Range    Sodium 137 135 - 147 mmol/L    Potassium 4.1 3.5 - 5.3 mmol/L    Chloride 107 96 - 108 mmol/L    CO2 22 21 - 32 mmol/L    ANION GAP 8 mmol/L    BUN 18 5 - 25 mg/dL    Creatinine 1.13 0.60 - 1.30 mg/dL    Glucose 79 65 - 140 mg/dL    Calcium 7.3 (L) 8.4 - 10.2 mg/dL    eGFR 46 ml/min/1.73sq m   Manual Differential(PHLEBS Do Not Order)    Collection Time: 02/06/24  5:17 AM   Result Value Ref Range    Segmented % 83 (H) 43 - 75 %    Bands % 4 0 - 8 %    Lymphocytes % 6 (L) 14 - 44 %    Monocytes % 5 4 - 12 %    Eosinophils, % 0 0 - 6 %    Basophils % 0 0 - 1 %    Metamyelocytes% 2 (H) 0 - 1 %    Absolute Neutrophils 17.15 (H) 1.85 - 7.62 Thousand/uL    Lymphocytes Absolute 1.18 0.60 - 4.47 Thousand/uL    Monocytes Absolute 0.99 0.00 - 1.22 Thousand/uL    Eosinophils Absolute 0.00 0.00 - 0.40 Thousand/uL    Basophils Absolute 0.00 0.00 - 0.10 Thousand/uL    Total Counted      RBC Morphology Normal     Platelet Estimate Adequate Adequate   ECG 12 lead    Collection Time: 02/06/24  5:22 AM   Result Value Ref Range    Ventricular Rate 80 BPM    Atrial Rate 80 BPM    [place] Interval 198 ms    QRSD Interval 146 ms    QT Interval 446 ms    QTC Interval 514 ms    P [place] 20 degrees    QRS Axis -57 degrees    T Wave Axis 56 degrees   CBC    Collection Time: 02/07/24  5:13 AM   Result Value Ref Range    WBC 20.76 (H) 4.31 - 10.16 Thousand/uL    RBC 3.14 (L) 3.81 - 5.12 Million/uL    Hemoglobin 10.0 (L) 11.5 - 15.4 g/dL    Hematocrit 31.4 (L) 34.8 - 46.1 %     (H) 82 - 98 fL    MCH 31.8 26.8 - 34.3 pg    MCHC 31.8 31.4 - 37.4 g/dL    RDW 14.8 11.6 - 15.1 %    Platelets 298 149 - 390 Thousands/uL    MPV 10.2 8.9 - 12.7 fL   Basic metabolic panel    Collection Time: 02/07/24  5:13 AM   Result Value Ref Range    Sodium 136 135 - 147 mmol/L    Potassium 4.2 3.5 - 5.3 mmol/L    Chloride 105 96 - 108 mmol/L    CO2 25 21 -  32 mmol/L    ANION GAP 6 mmol/L    BUN 17 5 - 25 mg/dL    Creatinine 1.14 0.60 - 1.30 mg/dL    Glucose 84 65 - 140 mg/dL    Calcium 7.3 (L) 8.4 - 10.2 mg/dL    eGFR 45 ml/min/1.73sq m   Magnesium    Collection Time: 02/07/24  5:13 AM   Result Value Ref Range    Magnesium 1.7 (L) 1.9 - 2.7 mg/dL   ECG 12 lead    Collection Time: 02/07/24  5:29 AM   Result Value Ref Range    Ventricular Rate 85 BPM    Atrial Rate 85 BPM    TN Interval 168 ms    QRSD Interval 142 ms    QT Interval 434 ms    QTC Interval 516 ms    P Axis -25 degrees    QRS Axis -55 degrees    T Wave Axis 58 degrees         XR chest portable    Result Date: 2/7/2024  Narrative: XR CHEST PORTABLE INDICATION: picc tip placement STAT wet read please. Arrhythmia after PICC placement. COMPARISON: CXR and chest CT 1/20/2024. FINDINGS: Right PICC visible in the distal right atrium. Tip questionably in the right ventricle. Clear lungs. No pneumothorax or pleural effusion. Mild cardiomegaly Bones are unremarkable for age. Normal upper abdomen.     Impression: No acute cardiopulmonary disease. Right PICC tip at least in the distal right atrium and possibly in the right ventricle. Recommend retracting 3 cm. I personally discussed this study with Dr. MALLY CORNELIUS on 2/7/2024 2:18 PM. Workstation performed: [id number]     EGD    Result Date: 1/31/2024  Narrative: Table formatting from the original result was not included. [facility] [address] [facility] [id number] [phone] DATE OF SERVICE: 1/31/24 PHYSICIAN(S): Attending: Rasta Hernandez MD Fellow: No Staff Documented INDICATION: Dark stools POST-OP DIAGNOSIS: See the impression below. PREPROCEDURE: Informed consent was obtained for the procedure, including sedation.  Risks of perforation, hemorrhage, adverse drug reaction and aspiration were discussed. The patient was placed in the left lateral decubitus position. Patient was explained about the risks and benefits of the  procedure. Risks including but not limited to bleeding, infection, and perforation were explained in detail. Also explained about less than 100% sensitivity with the exam and other alternatives. PROCEDURE: EGD DETAILS OF PROCEDURE: Patient was taken to the procedure room where a time out was performed to confirm correct patient and correct procedure. The patient underwent monitored anesthesia care, which was administered by an anesthesia professional. The patient's blood pressure, heart rate, level of consciousness, respirations and oxygen were monitored throughout the procedure. The scope was advanced to the second part of the duodenum. Retroflexion was performed in the fundus. The patient experienced no blood loss. The procedure was not difficult. The patient tolerated the procedure well. There were no apparent adverse events. ANESTHESIA INFORMATION: ASA: IV Anesthesia Type: IV Sedation with Anesthesia MEDICATIONS: No administrations occurring from 1520 to 1540 on 01/31/24 FINDINGS: Esophagus-about 3 cm hiatal hernia was seen.  Noted diffuse circumferential inflammation in the distal esophagus from esophagitis. Stomach-edematous folds in the prepyloric area with small ulcer between the folds towards the lesser curvature.  Gastric body biopsies done to check for H. pylori Duodenum-duodenitis noted in the bulb and circumferential ulceration on the folds in the second part of the duodenum SPECIMENS: ID Type Source Tests Collected by Time Destination 1 : gastric body-cold forcep bx Tissue Stomach TISSUE EXAM Rasta Hernandez MD 1/31/2024  3:36 PM      Impression: No impression generated RECOMMENDATION:  Await pathology results  Schedule repeat EGD  Pantoprazole 40 mg twice daily Repeat EGD in 3 months    Rasta Hernandez MD     [place] retrograde pyelogram    Result Date: 1/29/2024  Narrative: RIGHT RETROGRADE PYELOGRAM INDICATION:   OR. Right ureteral calculus. COMPARISON: CT 1/28/2024. IMAGES:  2 FLUOROSCOPY TIME:   15.7  seconds CONTRAST: 16 mL of iohexol (OMNIPAQUE) FINDINGS: Fluoroscopic guidance provided for retrograde pyelogram. Images demonstrate retrograde opacification of dilated right renal collecting system, and ureteral stent placement. Osseous and soft tissue detail limited by technique.     Impression: Fluoroscopic guidance provided for right retrograde pyelogram. Please see procedure report for further details. Workstation performed: [id number]     Echo complete w/ contrast if indicated    Result Date: 1/29/2024  Narrative:   Left Ventricle: Left ventricular cavity size is normal. Wall thickness is moderately increased. The left ventricular ejection fraction is 65%. Systolic function is normal. Diastolic function is mildly abnormal, consistent with grade I (abnormal) relaxation.   The following segments are hypokinetic: basal inferoseptal and basal inferior.   All other segments are normal.   Right Ventricle: Right ventricular cavity size is normal. Systolic function is normal.   Left Atrium: The atrium is mildly dilated.   Mitral Valve: There is moderate thickening involving the leaflet margin more than the base. There is mild annular calcification. There is mild regurgitation.   Tricuspid Valve: There is mild regurgitation.     XR chest 1 view portable    Result Date: 1/29/2024  Narrative: CHEST INDICATION:   SOB. COMPARISON:  None EXAM PERFORMED/VIEWS:  XR CHEST PORTABLE FINDINGS: Enlarged cardiomediastinal silhouette. No apical pleural capping. The lungs are clear.  No pneumothorax or pleural effusion. No acute osseous abnormality identified within limitations of portable radiography,     Impression: Enlarged cardiomediastinal silhouette. No apical pleural capping. Workstation performed: [id number]     CT chest abdomen pelvis wo contrast    Result Date: 1/29/2024  Narrative: CT CHEST, ABDOMEN AND PELVIS WITHOUT IV CONTRAST INDICATION: r/o pulm edema, eval focal PNA. COMPARISON: None. TECHNIQUE: CT examination of  the chest, abdomen and pelvis was performed without intravenous contrast. Multiplanar 2D reformatted images were created from the source data. This examination, like all CT scans performed in the [facility], was performed utilizing techniques to minimize radiation dose exposure, including the use of iterative reconstruction and automated exposure control. Radiation dose length product (DLP) for this visit: 1238 mGy-cm Enteric Contrast: Not administered. FINDINGS: CHEST LUNGS: Lungs are clear. No tracheal or endobronchial lesion. PLEURA: Trace right pleural effusion. HEART/GREAT VESSELS: Moderate cardiomegaly. Coronary and valvular calcifications. No thoracic aortic aneurysm. MEDIASTINUM AND ROSARIO: Unremarkable. CHEST WALL AND LOWER NECK: Unremarkable. ABDOMEN LIVER/BILIARY TREE: Unremarkable. GALLBLADDER: Distended, with multiple gallstones. SPLEEN: Unremarkable. PANCREAS: Unremarkable. ADRENAL GLANDS: Unremarkable. KIDNEYS/URETERS: 6 mm right ureterovesicular junction calculus. Moderate right hydroureteronephrosis with right perinephric and right periureteral stranding.  2.7 cm simple cyst in the lower pole of the left kidney. STOMACH AND BOWEL: No evidence of bowel obstruction or gross inflammatory process. Colonic diverticulosis without evidence of diverticulitis. APPENDIX: No findings to suggest appendicitis. ABDOMINOPELVIC CAVITY: No ascites. No pneumoperitoneum. No lymphadenopathy. VESSELS: Atherosclerosis without abdominal aortic aneurysm. PELVIS REPRODUCTIVE ORGANS: Unremarkable for patient's age. URINARY BLADDER: Unremarkable. ABDOMINAL WALL/INGUINAL REGIONS: Unremarkable. BONES: No acute fracture or suspicious osseous lesion. Spinal degenerative changes.     Impression: Trace right pleural fluid. Moderate cardiomegaly. 6 mm right UVJ calculus. Moderate right hydroureteronephrosis. Perinephric and periureteral stranding. Cholelithiasis. No radiographic evidence of cholecystitis. Colonic  diverticulosis. Other findings, as per the body of the report. Preliminary report from [facility] was provided on 1/28/2024 at 11:35 p.m. Workstation performed: [id number]     ECOG :3      Assessment and plan:    She is 78-year-old morbidly obese female with skin rash most likely monilia, we do not have any previous health records, the patient admitted with fatigue, shortness of breath, was found to have hypercapnia, most likely she has obstructive sleep apnea    Also was found to have elevated troponin, blood blood culture was negative however urine culture showed E. coli sensitive to all antibiotics in the sensitive list    When she was admitted with a leukocytosis with WBC of 22.7, mild thrombocytopenia of 92,000, after 5 to 6 days of treatment current WBC of 19.7, hemoglobin 10.3, platelets 252,000, CT scan showed small bilateral pleural effusion no evidence of masses or lymphadenopathy    I believe this leukocytosis secondary to either UTI versus Candida of the skin beneath the breast    We do not think this is myeloproliferative or abnormal bone marrow giving the acute UTI and the decrease in the WBC    No need for additional workup

## 2024-02-08 VITALS
DIASTOLIC BLOOD PRESSURE: 69 MMHG | BODY MASS INDEX: 54.79 KG/M2 | HEART RATE: 82 BPM | TEMPERATURE: 99.4 F | WEIGHT: 253.97 LBS | HEIGHT: 57 IN | SYSTOLIC BLOOD PRESSURE: 160 MMHG | RESPIRATION RATE: 20 BRPM | OXYGEN SATURATION: 93 %

## 2024-02-08 LAB
ALBUMIN SERPL BCP-MCNC: 2.3 G/DL (ref 3.5–5)
ALP SERPL-CCNC: 67 U/L (ref 34–104)
ALT SERPL W P-5'-P-CCNC: 9 U/L (ref 7–52)
ANION GAP SERPL CALCULATED.3IONS-SCNC: 5 MMOL/L
AST SERPL W P-5'-P-CCNC: 13 U/L (ref 13–39)
ATRIAL RATE: 80 BPM
ATRIAL RATE: 93 BPM
BASOPHILS # BLD AUTO: 0.1 THOUSANDS/ÂΜL (ref 0–0.1)
BASOPHILS NFR BLD AUTO: 1 % (ref 0–1)
BILIRUB SERPL-MCNC: 0.44 MG/DL (ref 0.2–1)
BUN SERPL-MCNC: 16 MG/DL (ref 5–25)
CALCIUM ALBUM COR SERPL-MCNC: 8.9 MG/DL (ref 8.3–10.1)
CALCIUM SERPL-MCNC: 7.5 MG/DL (ref 8.4–10.2)
CHLORIDE SERPL-SCNC: 106 MMOL/L (ref 96–108)
CO2 SERPL-SCNC: 27 MMOL/L (ref 21–32)
CREAT SERPL-MCNC: 1.16 MG/DL (ref 0.6–1.3)
EOSINOPHIL # BLD AUTO: 0.19 THOUSAND/ÂΜL (ref 0–0.61)
EOSINOPHIL NFR BLD AUTO: 1 % (ref 0–6)
ERYTHROCYTE [DISTWIDTH] IN BLOOD BY AUTOMATED COUNT: 14.5 % (ref 11.6–15.1)
GFR SERPL CREATININE-BSD FRML MDRD: 44 ML/MIN/1.73SQ M
GLUCOSE SERPL-MCNC: 91 MG/DL (ref 65–140)
HCT VFR BLD AUTO: 29.7 % (ref 34.8–46.1)
HGB BLD-MCNC: 9.5 G/DL (ref 11.5–15.4)
IMM GRANULOCYTES # BLD AUTO: 0.28 THOUSAND/UL (ref 0–0.2)
IMM GRANULOCYTES NFR BLD AUTO: 2 % (ref 0–2)
LYMPHOCYTES # BLD AUTO: 1.76 THOUSANDS/ÂΜL (ref 0.6–4.47)
LYMPHOCYTES NFR BLD AUTO: 10 % (ref 14–44)
MAGNESIUM SERPL-MCNC: 1.9 MG/DL (ref 1.9–2.7)
MCH RBC QN AUTO: 31.9 PG (ref 26.8–34.3)
MCHC RBC AUTO-ENTMCNC: 32 G/DL (ref 31.4–37.4)
MCV RBC AUTO: 100 FL (ref 82–98)
MONOCYTES # BLD AUTO: 1.62 THOUSAND/ÂΜL (ref 0.17–1.22)
MONOCYTES NFR BLD AUTO: 9 % (ref 4–12)
NEUTROPHILS # BLD AUTO: 13.83 THOUSANDS/ÂΜL (ref 1.85–7.62)
NEUTS SEG NFR BLD AUTO: 77 % (ref 43–75)
NRBC BLD AUTO-RTO: 0 /100 WBCS
P AXIS: -24 DEGREES
P AXIS: 48 DEGREES
PLATELET # BLD AUTO: 319 THOUSANDS/UL (ref 149–390)
PMV BLD AUTO: 9.8 FL (ref 8.9–12.7)
POTASSIUM SERPL-SCNC: 4.4 MMOL/L (ref 3.5–5.3)
PR INTERVAL: 172 MS
PR INTERVAL: 192 MS
PROT SERPL-MCNC: 5.7 G/DL (ref 6.4–8.4)
QRS AXIS: -57 DEGREES
QRS AXIS: -58 DEGREES
QRSD INTERVAL: 146 MS
QRSD INTERVAL: 154 MS
QT INTERVAL: 402 MS
QT INTERVAL: 436 MS
QTC INTERVAL: 499 MS
QTC INTERVAL: 502 MS
RBC # BLD AUTO: 2.98 MILLION/UL (ref 3.81–5.12)
SARS-COV-2 RNA RESP QL NAA+PROBE: NEGATIVE
SL CV LV EF: 60
SODIUM SERPL-SCNC: 138 MMOL/L (ref 135–147)
T WAVE AXIS: 63 DEGREES
T WAVE AXIS: 77 DEGREES
VENTRICULAR RATE: 80 BPM
VENTRICULAR RATE: 93 BPM
WBC # BLD AUTO: 17.78 THOUSAND/UL (ref 4.31–10.16)

## 2024-02-08 PROCEDURE — 93005 ELECTROCARDIOGRAM TRACING: CPT

## 2024-02-08 PROCEDURE — 93010 ELECTROCARDIOGRAM REPORT: CPT | Performed by: INTERNAL MEDICINE

## 2024-02-08 PROCEDURE — 87635 SARS-COV-2 COVID-19 AMP PRB: CPT

## 2024-02-08 PROCEDURE — 99239 HOSP IP/OBS DSCHRG MGMT >30: CPT | Performed by: INTERNAL MEDICINE

## 2024-02-08 PROCEDURE — 83735 ASSAY OF MAGNESIUM: CPT | Performed by: STUDENT IN AN ORGANIZED HEALTH CARE EDUCATION/TRAINING PROGRAM

## 2024-02-08 PROCEDURE — 99232 SBSQ HOSP IP/OBS MODERATE 35: CPT | Performed by: NURSE PRACTITIONER

## 2024-02-08 PROCEDURE — 93312 ECHO TRANSESOPHAGEAL: CPT | Performed by: INTERNAL MEDICINE

## 2024-02-08 PROCEDURE — 93320 DOPPLER ECHO COMPLETE: CPT | Performed by: INTERNAL MEDICINE

## 2024-02-08 PROCEDURE — 85025 COMPLETE CBC W/AUTO DIFF WBC: CPT

## 2024-02-08 PROCEDURE — 80053 COMPREHEN METABOLIC PANEL: CPT

## 2024-02-08 PROCEDURE — 93325 DOPPLER ECHO COLOR FLOW MAPG: CPT | Performed by: INTERNAL MEDICINE

## 2024-02-08 RX ORDER — AMLODIPINE BESYLATE 10 MG/1
10 TABLET ORAL DAILY
Start: 2024-02-09 | End: 2024-03-10

## 2024-02-08 RX ORDER — CARVEDILOL 3.12 MG/1
3.12 TABLET ORAL 2 TIMES DAILY WITH MEALS
Status: DISCONTINUED | OUTPATIENT
Start: 2024-02-08 | End: 2024-02-08 | Stop reason: HOSPADM

## 2024-02-08 RX ORDER — PANTOPRAZOLE SODIUM 40 MG/1
40 TABLET, DELAYED RELEASE ORAL
Start: 2024-02-08 | End: 2024-03-09

## 2024-02-08 RX ORDER — CARVEDILOL 3.12 MG/1
3.12 TABLET ORAL 2 TIMES DAILY WITH MEALS
Start: 2024-02-08 | End: 2024-03-09

## 2024-02-08 RX ORDER — ACETAMINOPHEN 325 MG/1
975 TABLET ORAL EVERY 8 HOURS PRN
Start: 2024-02-08

## 2024-02-08 RX ORDER — ALBUTEROL SULFATE 90 UG/1
2 AEROSOL, METERED RESPIRATORY (INHALATION) EVERY 4 HOURS PRN
Start: 2024-02-08 | End: 2024-02-22

## 2024-02-08 RX ORDER — LANOLIN ALCOHOL/MO/W.PET/CERES
1 CREAM (GRAM) TOPICAL
Start: 2024-02-09 | End: 2024-03-10

## 2024-02-08 RX ORDER — ATORVASTATIN CALCIUM 80 MG/1
80 TABLET, FILM COATED ORAL EVERY EVENING
Start: 2024-02-08 | End: 2024-03-09

## 2024-02-08 RX ORDER — CLOPIDOGREL BISULFATE 75 MG/1
75 TABLET ORAL DAILY
Start: 2024-02-09 | End: 2024-03-10

## 2024-02-08 RX ORDER — NYSTATIN 100000 [USP'U]/G
POWDER TOPICAL 2 TIMES DAILY
Start: 2024-02-08

## 2024-02-08 RX ORDER — CARVEDILOL 6.25 MG/1
6.25 TABLET ORAL 2 TIMES DAILY WITH MEALS
Status: DISCONTINUED | OUTPATIENT
Start: 2024-02-08 | End: 2024-02-08

## 2024-02-08 RX ORDER — LANOLIN ALCOHOL/MO/W.PET/CERES
6 CREAM (GRAM) TOPICAL
Start: 2024-02-08 | End: 2024-03-09

## 2024-02-08 RX ADMIN — NYSTATIN: 100000 POWDER TOPICAL at 07:13

## 2024-02-08 RX ADMIN — PANTOPRAZOLE SODIUM 40 MG: 40 TABLET, DELAYED RELEASE ORAL at 05:23

## 2024-02-08 RX ADMIN — Medication 1 TABLET: at 07:17

## 2024-02-08 RX ADMIN — ASPIRIN 81 MG: 81 TABLET, COATED ORAL at 07:17

## 2024-02-08 RX ADMIN — AMPICILLIN SODIUM 2000 MG: 2 INJECTION, POWDER, FOR SOLUTION INTRAVENOUS at 10:00

## 2024-02-08 RX ADMIN — SALINE NASAL SPRAY 2 SPRAY: 1.5 SOLUTION NASAL at 07:13

## 2024-02-08 RX ADMIN — AMLODIPINE BESYLATE 10 MG: 10 TABLET ORAL at 07:17

## 2024-02-08 RX ADMIN — CHLORHEXIDINE GLUCONATE 15 ML: 1.2 SOLUTION ORAL at 07:19

## 2024-02-08 RX ADMIN — AMPICILLIN SODIUM 2000 MG: 2 INJECTION, POWDER, FOR SOLUTION INTRAVENOUS at 04:27

## 2024-02-08 RX ADMIN — HEPARIN SODIUM 5000 UNITS: 5000 INJECTION INTRAVENOUS; SUBCUTANEOUS at 13:39

## 2024-02-08 RX ADMIN — CLOPIDOGREL BISULFATE 75 MG: 75 TABLET ORAL at 07:17

## 2024-02-08 RX ADMIN — ACETAMINOPHEN 975 MG: 325 TABLET, FILM COATED ORAL at 07:24

## 2024-02-08 RX ADMIN — HEPARIN SODIUM 5000 UNITS: 5000 INJECTION INTRAVENOUS; SUBCUTANEOUS at 05:22

## 2024-02-08 NOTE — PROGRESS NOTES
Progress Note - Infectious Disease   Loida Duarte 78 y.o. female MRN: [id number]  Unit/Bed#: S -01 Encounter: [id number]      Impression/Plan:  Sepsis. POA. E/b fever to 100.7F, tachycardia, leukocytosis to 27, and lactic acidosis on admission. Most likely I/s/o #2, #3, and #4. CT C/A/P on admission showed a 6mm right distal ureteral calculus with right-sided hydronephrosis with perinephric and periureteral stranding. CT chest did not show any evidence of consolidation. Moderate cardiomegaly noted. COVID/Flu/RSV negative. UA obtained with innumerable WBC, large LE, and moderate blood c/f urinary source. Remains afebrile and HDS. WBC improved from 30 to 22 today. No new symptoms per patient. Urine culture from straight cath resulted positive for E.coli, Ucx from cystoscopy positive for E.faecalis. Now with c/f GI bleed. GI consulted for EGD.  EGD negative for a bleeding source.  Repeat blood cultures negative thus far.  White blood cell count has been steadily increasing of unclear source or significant.  White blood cell count continues to wax and wane but remains elevated suggesting the possibility of a primary hematologic process  Antibiotics as below  Recheck blood cultures as below  Supportive care  Recheck CBC with differential to make sure white count is a decrease     Polymicrobial bacteremia. Blood cultures on admission positive both sets (2/2) for GNRs and GPCs in pairs identified as E.coli and E.faecalis. Most likely etiology #3 and #4.  Repeat blood cultures positive in 1 of 2 sets for Enterococcus again but this was prior to the ampicillin, and now the bacteremia is cleared..  Transesophageal echocardiogram without valvular vegetation appreciated although clearly abnormal valves.  PICC line placed.  Continue intravenous ampicillin through 2/16/2024 to complete 2 weeks from the first negative blood culture  Recheck CBC with differential and BMP to make sure not developing toxicity  Remove PICC  line after last dose of IV antibiotics 2/16/2024.     UTI complicated by pyelonephritis. Likely I/s/o #4. Likely etiology of #2. UA obtained in ED with innumerable WBC, large leukocytes, and moderate blood. CT A/P on admission showed #4 and perinephric and periureteral stranding. Ucx from straight cath resulted positive for E.coli, Ucx from cystoscopy positive for E.faecalis.   Antibiotics as above  No additional urologic workup for now     Right distal ureteral calculus c/b right-sided hydronephrosis. Likely etiology of #3. CT A/P on admission showed a 6mm right UVJ calculus. Now s/p OR on 1/29 for right ureteral stent placement with Urology.   Close urology follow-up.     5. Acute hypoxic respiratory failure. POA. Noted to be 88% on room air. Also with diffuse wheezing on exam. CT chest demonstrated no acute pulmonary abnormalities. Showed cardiomegaly.  Still requiring oxygen support consider pulmonary edema.  Decreased oxygen needs  Monitor respiratory status  Oxygen support     6. Penicillin allergy- resolved. Had an allergy listed as swelling. States she had a rash as a child, but has not had it since. Has not seen a doctor in 50 years. Passed an amoxicillin challenge on 1/30. Tolerating ampicillin without difficulty.   No longer a true allergy      7. Morbid obesity. BMI noted to be 51 on admission. May affect antibiotic dosing.     8.  Persistent leukocytosis.  With the patient clinically improved and the white count staying elevated, consideration for the possibility of a primary hematologic process that has gone undiagnosed.  The patient has not had labs done in decades as she has not seen a physician in 50 years.  Heme-onc does not think there is any primary blood disorder.  White cell count has slowly come down.  Recheck CBC with differential as an outpatient    I spent 50 minutes on the unit floor of which 30 minutes was in counseling/coordination of care as outlined in my note including beginning  arrangements for outpatient intravenous antibiotics, laboratory follow-up, and detailed discussion with the primary service.    Antibiotics:  Ampicillin 9  Antibiotics 12  Negative blood culture 6    Subjective:  Patient has no fever, chills, sweats; no nausea, vomiting, diarrhea; no cough, shortness of breath; having some flank pain in the area of the hematoma. No new symptoms.    Objective:  Vitals:  Temp:  [98.7 °F (37.1 °C)-99.4 °F (37.4 °C)] 99.4 °F (37.4 °C)  HR:  [82-86] 82  Resp:  [16-20] 20  BP: (139-160)/(66-71) 160/69  SpO2:  [91 %-95 %] 93 %  Temp (24hrs), Av.2 °F (37.3 °C), Min:98.7 °F (37.1 °C), Max:99.4 °F (37.4 °C)  Current: Temperature: 99.4 °F (37.4 °C)    Physical Exam:   General Appearance:  Alert, interactive, nontoxic, no acute distress.   Throat: Oropharynx moist without lesions.    Lungs:   Clear to auscultation bilaterally; no wheezes, rhonchi or rales; respirations unlabored   Heart:  RRR; no murmur, rub or gallop   Abdomen:   Soft, non-tender, non-distended, positive bowel sounds.     Extremities: No clubbing, cyanosis or edema   Skin: No new rashes or lesions. No draining wounds noted.  Several hematomas involving lower flank.       Labs, Imaging, & Other studies:   All pertinent labs and imaging studies were personally reviewed  Results from last 7 days   Lab Units 24  0440 24  0513 24  0517   WBC Thousand/uL 17.78* 20.76* 19.71*   HEMOGLOBIN g/dL 9.5* 10.0* 10.3*   PLATELETS Thousands/uL 319 298 252     Results from last 7 days   Lab Units 24  0440 24  0513 24  0517 24  0411 24  0458 24  0523   SODIUM mmol/L 138 136 137 138   < > 136   POTASSIUM mmol/L 4.4 4.2 4.1 4.2   < > 4.1   CHLORIDE mmol/L 106 105 107 106   < > 104   CO2 mmol/L 27 25 22 25   < > 25   BUN mg/dL 16 17 18 21   < > 31*   CREATININE mg/dL 1.16 1.14 1.13 1.19   < > 1.38*   EGFR ml/min/1.73sq m 44 45 46 43   < > 36   CALCIUM mg/dL 7.5* 7.3* 7.3* 7.3*   < > 7.2*    AST U/L 13  --   --  15  --  20   ALT U/L 9  --   --  9  --  11   ALK PHOS U/L 67  --   --  80  --  74    < > = values in this interval not displayed.     Results from last 7 days   Lab Units 02/02/24  0453   BLOOD CULTURE  No Growth After 5 Days.  No Growth After 5 Days.             Results from last 7 days   Lab Units 02/04/24  0458   FERRITIN ng/mL 193

## 2024-02-08 NOTE — CASE MANAGEMENT
Case Management Discharge Planning Note    Patient name Loida Duarte  Location S /S -01 MRN [id number]  : 1945 Date 2024       Current Admission Date: 2024  Current Admission Diagnosis:Severe sepsis (HCC)   Patient Active Problem List    Diagnosis Date Noted    Electrolyte abnormality 2024    Anemia 2024    Heme positive stool 2024    Hypertension 2024    Right ureteral calculus 2024    Severe sepsis (HCC) 2024    Acute respiratory insufficiency 2024    RAKEL (acute kidney injury) (HCC) 2024    Elevated troponin 2024    Obstructive pyelonephritis 2024      LOS (days): 10  Geometric Mean LOS (GMLOS) (days): 9.9  Days to GMLOS:-0.5     OBJECTIVE:  Risk of Unplanned Readmission Score: 15.86         Current admission status: Inpatient   Preferred Pharmacy: No Pharmacies Listed  Primary Care Provider: No primary care provider on file.    Primary Insurance: MEDICARE  Secondary Insurance: [employer] PENDING    DISCHARGE DETAILS:    Discharge planning discussed with:: Patient and daughter at bedside                                     Other Referral/Resources/Interventions Provided:  Interventions: Short Term Rehab  Referral Comments: Patient is medically stable for discharge today --  Patient's bed is available at [facility] after 3pm, per admissions representative Liz. CM spoke with patient and daughter at bedside to discuss same -- All in agreement with transfer. CM requested transport for 3:30pm via BLS (due to poor trunk control, possible need for O2).          Treatment Team Recommendation: Short Term Rehab  Discharge Destination Plan:: Short Term Rehab                  Covid test ordered and PASRR completed, per facility's request.              IMM Given (Date):: 24  IMM Given to:: Family

## 2024-02-08 NOTE — PLAN OF CARE
Problem: INFECTION - ADULT  Goal: Absence or prevention of progression during hospitalization  Description: INTERVENTIONS:  - Assess and monitor for signs and symptoms of infection  - Monitor lab/diagnostic results  - Monitor all insertion sites, i.e. indwelling lines, tubes, and drains  - Vanderbilt appropriate cooling/warming therapies per order  - Administer medications as ordered  - Instruct and encourage patient and family to use good hand hygiene technique  - Identify and instruct in appropriate isolation precautions for identified infection/condition  Outcome: Progressing

## 2024-02-08 NOTE — PROGRESS NOTES
General Cardiology   Progress Note -  Team One   Loida Duarte 78 y.o. female MRN: [id number]  Unit/Bed#: S -01 Encounter: [id number]    Assessment     Elevated troponin- likely type 2 MI in setting of sepsis and acute hypoxic respiratory failure  No c/o chest pain on admission or throughout  Hs troponin trend: 14,797-->11,339-->12,709  ECG- sinus tachycardia with RBBB  TTE LVEF 65% with inferior wall hypokinesis, normal RV size/function, moderate mitral valve thickening with mild MR, and mild TR  CT notes coronary artery calcifications  S/p 48 hours of IV heparin  On ASA, Plavix, and statin     HTN- Presented septic with borderline low blood pressures. Now hypertensive. Started on amlodipine 10 mg daily by primary team on 2/1.  Last /69    NSVT- 2/2 to PICC line placement that was advanced too far into the RA and possibly into the RV. PICC line pulled back 3 cm with resolution of ectopy  Also treated with metoprolol 50 mg and 150 mg IV Amiodarone  Tele- NSR. No further NSVT since yesterday afternoon     4.  Acute hypoxic respiratory failure, resolved  Initial BNP 2737. CXR without congestion  On/off 2LNC but now on room air  Does not appear overloaded on exam or imaging. Creatinine improved with IVF     5.  Urosepsis secondary to right ureteral calculus, bacteremia  Urine and blood cx growing E. Coli and enterococcus faecalis   On IV antibiotics per primary team/ID     6.  RAKEL, resolved- in setting of sepsis and obstructing renal calculus. Creatinine improved with IVF. Peak of 2.89, now stable at 1.1.     7.   Dyslipidemia- , , HDL 37, . Started on atorvastatin 80 mg daily     8.   Morbid obesity- BMI 52.94    9.   Positive FOBT - There was some concern for coffee ground emesis PTA but she also was eating black licorice which is what she thinks caused it. No evidence of acute bleeding initially but with + FOBT 1/31. S/p EGD with esophagitis but no bleeding. Hgb overall stable    "  Plan/discussion  Continue medical therapy with aspirin, Plavix, and statin  No further ectopy after retraction of PICC line  Blood pressure suboptimally controlled, add carvedilol   She is agreeable with outpatient cardiology follow up and stress testing. She lives in [place] but prefers [place] since that is near her son. No appointments available at that location until end of March. Follow up arranged at [facility] for .    Subjective  Review of Systems   Constitutional: Negative for chills, malaise/fatigue and weight gain.   Cardiovascular:  Negative for chest pain, dyspnea on exertion, leg swelling, orthopnea, palpitations and syncope.   Respiratory:  Negative for cough, shortness of breath, sleep disturbances due to breathing and sputum production.    Gastrointestinal:  Negative for bloating, nausea and vomiting.   Genitourinary:  Positive for bladder incontinence.   Neurological:  Positive for weakness. Negative for dizziness and light-headedness.   Psychiatric/Behavioral:  Negative for altered mental status.    All other systems reviewed and are negative.    Objective:   Vitals: Blood pressure 160/69, pulse 82, temperature 99.4 °F (37.4 °C), temperature source Oral, resp. rate 20, height 4' 9.01\" (1.448 m), weight 115 kg (253 lb 15.5 oz), SpO2 93%., Body mass index is 54.94 kg/m².,     Systolic (24hrs), Av , Min:139 , Max:160     Diastolic (24hrs), Av, Min:66, Max:71        Intake/Output Summary (Last 24 hours) at 2024 1025  Last data filed at 2024 1601  Gross per 24 hour   Intake --   Output 885 ml   Net -885 ml     Wt Readings from Last 3 Encounters:   24 115 kg (253 lb 15.5 oz)       Telemetry Review: NSR, NSVT  afternoon    Physical Exam  Vitals reviewed.   Constitutional:       General: She is not in acute distress.     Appearance: She is obese.   Neck:      Vascular: No hepatojugular reflux or JVD.   Cardiovascular:      Rate and Rhythm: Normal rate and " regular rhythm.      Heart sounds: Normal heart sounds. No murmur heard.     No friction rub. No gallop.   Pulmonary:      Effort: Pulmonary effort is normal. No respiratory distress.      Breath sounds: No rales.      Comments: Clear, diminished at bases. On room air  Abdominal:      General: Bowel sounds are normal. There is no distension.      Palpations: Abdomen is soft.      Tenderness: There is no abdominal tenderness.   Musculoskeletal:         General: No tenderness. Normal range of motion.      Cervical back: Neck supple.      Right lower leg: No edema.      Left lower leg: No edema.   Skin:     General: Skin is warm and dry.      Findings: No erythema.   Neurological:      Mental Status: She is alert and oriented to person, place, and time.   Psychiatric:         Mood and Affect: Mood normal.         LABORATORY RESULTS      CBC with diff:   Results from last 7 days   Lab Units 02/08/24  0440 02/07/24  0513 02/06/24  0517 02/05/24  0411 02/04/24  0458 02/03/24  0523 02/02/24  0503   WBC Thousand/uL 17.78* 20.76* 19.71* 20.68* 16.24* 13.71* 12.13*   HEMOGLOBIN g/dL 9.5* 10.0* 10.3* 10.9* 11.3* 11.2* 10.7*   HEMATOCRIT % 29.7* 31.4* 32.7* 34.3* 35.5 34.6* 32.8*   MCV fL 100* 100* 100* 99* 101* 99* 97   PLATELETS Thousands/uL 319 298 252 214 182 126* 99*   RBC Million/uL 2.98* 3.14* 3.28* 3.47* 3.52* 3.51* 3.39*   MCH pg 31.9 31.8 31.4 31.4 32.1 31.9 31.6   MCHC g/dL 32.0 31.8 31.5 31.8 31.8 32.4 32.6   RDW % 14.5 14.8 14.8 15.0 15.1 14.9 14.7   MPV fL 9.8 10.2 10.6 10.9 12.1 12.2 12.5   NRBC AUTO /100 WBCs 0  --   --   --   --   --   --      CMP:  Results from last 7 days   Lab Units 02/08/24  0440 02/07/24  0513 02/06/24  0517 02/05/24  0411 02/04/24  0458 02/03/24  0523 02/02/24  0503   POTASSIUM mmol/L 4.4 4.2 4.1 4.2 3.7 4.1 3.9   CHLORIDE mmol/L 106 105 107 106 107 104 105   CO2 mmol/L 27 25 22 25 21 25 22   BUN mg/dL 16 17 18 21 25 31* 43*   CREATININE mg/dL 1.16 1.14 1.13 1.19 1.27 1.38* 1.56*  "  CALCIUM mg/dL 7.5* 7.3* 7.3* 7.3* 7.4* 7.2* 7.6*   AST U/L 13  --   --  15  --  20  --    ALT U/L 9  --   --  9  --  11  --    ALK PHOS U/L 67  --   --  80  --  74  --    EGFR ml/min/1.73sq m 44 45 46 43 39 36 31     BMP:  Results from last 7 days   Lab Units 02/08/24  0440 02/07/24  0513 02/06/24  0517 02/05/24  0411 02/04/24  0458 02/03/24  0523 02/02/24  0503   POTASSIUM mmol/L 4.4 4.2 4.1 4.2 3.7 4.1 3.9   CHLORIDE mmol/L 106 105 107 106 107 104 105   CO2 mmol/L 27 25 22 25 21 25 22   BUN mg/dL 16 17 18 21 25 31* 43*   CREATININE mg/dL 1.16 1.14 1.13 1.19 1.27 1.38* 1.56*   CALCIUM mg/dL 7.5* 7.3* 7.3* 7.3* 7.4* 7.2* 7.6*     Lab Results   Component Value Date    CREATININE 1.16 02/08/2024    CREATININE 1.14 02/07/2024    CREATININE 1.13 02/06/2024      Results from last 7 days   Lab Units 02/08/24  0440 02/07/24  0513 02/05/24  0411 02/04/24  0458 02/02/24  0503   MAGNESIUM mg/dL 1.9 1.7* 2.1 1.9 2.1     Results from last 7 days   Lab Units 02/05/24  0411   INR  1.70*     Lipid Profile:   No results found for: \"CHOL\"  Lab Results   Component Value Date    HDL 37 (L) 01/29/2024     Lab Results   Component Value Date    LDLCALC 123 (H) 01/29/2024     Lab Results   Component Value Date    TRIG 223 (H) 01/29/2024     Meds/Allergies   all current active meds have been reviewed and current meds:   Current Facility-Administered Medications   Medication Dose Route Frequency    acetaminophen (TYLENOL) tablet 975 mg  975 mg Oral Q8H PRN    albuterol (PROVENTIL HFA,VENTOLIN HFA) inhaler 2 puff  2 puff Inhalation Q4H PRN    amLODIPine (NORVASC) tablet 10 mg  10 mg Oral Daily    ampicillin (OMNIPEN) 2,000 mg in sodium chloride 0.9 % 100 mL IVPB  2,000 mg Intravenous Q6H    aspirin (ECOTRIN LOW STRENGTH) EC tablet 81 mg  81 mg Oral Daily    atorvastatin (LIPITOR) tablet 80 mg  80 mg Oral QPM    calcium carbonate-vitamin D 500 mg-5 mcg tablet 1 tablet  1 tablet Oral Daily With Breakfast    chlorhexidine (PERIDEX) 0.12 % " oral rinse 15 mL  15 mL Mouth/Throat Q12H GUNJAN    clopidogrel (PLAVIX) tablet 75 mg  75 mg Oral Daily    diphenhydrAMINE (BENADRYL) 25 mg in sodium chloride 0.9 % 50 mL IVPB  25 mg Intravenous Q6H PRN    diphenhydramine, lidocaine, Al/Mg hydroxide, simethicone (Magic Mouthwash) oral solution 10 mL  10 mL Swish & Spit Q6H PRN    EPINEPHrine PF (ADRENALIN) 1 mg/mL injection 0.3 mg  0.3 mg Intramuscular Once PRN    heparin (porcine) subcutaneous injection 5,000 Units  5,000 Units Subcutaneous Q8H GUNJAN    hydrALAZINE (APRESOLINE) injection 10 mg  10 mg Intravenous Q8H PRN    levalbuterol (XOPENEX) inhalation solution 1.25 mg  1.25 mg Nebulization TID PRN    melatonin tablet 6 mg  6 mg Oral HS    nystatin (MYCOSTATIN) powder   Topical BID    pantoprazole (PROTONIX) EC tablet 40 mg  40 mg Oral BID AC    sodium chloride (OCEAN) 0.65 % nasal spray 2 spray  2 spray Each Nare Daily    trimethobenzamide (TIGAN) IM injection 200 mg  200 mg Intramuscular Q6H PRN     No medications prior to admission.     Counseling / Coordination of Care  Total floor / unit time spent today 20 minutes.  Greater than 50% of total time was spent with the patient and / or family counseling and / or coordination of care.      ** Please Note: Dragon 360 Dictation voice to text software may have been used in the creation of this document. **

## 2024-02-08 NOTE — CASE MANAGEMENT
Case Management Progress Note    Patient name Loida Duarte  Location S /S -01 MRN [id number]  : 1945 Date 2024       LOS (days): 10  Geometric Mean LOS (GMLOS) (days): 9.9  Days to GMLOS:-0.5        OBJECTIVE:        Current admission status: Inpatient  Preferred Pharmacy: No Pharmacies Listed  Primary Care Provider: No primary care provider on file.    Primary Insurance: MEDICARE  Secondary Insurance: [employer] PENDING    PROGRESS NOTE:    Weekly Care Management Length of Stay Review     Current LOS: 10 Days    Most Recent Labs:     Lab Results   Component Value Date/Time    WBC 17.78 (H) 2024 04:40 AM    HGB 9.5 (L) 2024 04:40 AM    HCT 29.7 (L) 2024 04:40 AM     2024 04:40 AM    BANDSPCT 4 2024 05:17 AM    SODIUM 138 2024 04:40 AM    K 4.4 2024 04:40 AM     2024 04:40 AM    CO2 27 2024 04:40 AM    BUN 16 2024 04:40 AM    CREATININE 1.16 2024 04:40 AM    GLUC 91 2024 04:40 AM    ALKPHOS 67 2024 04:40 AM    ALT 9 2024 04:40 AM    AST 13 2024 04:40 AM    ALB 2.3 (L) 2024 04:40 AM    TBILI 0.44 2024 04:40 AM       Most Recent Vitals:   Vitals:    24 0711   BP: 160/69   Pulse: 82   Resp: 20   Temp: 99.4 °F (37.4 °C)   SpO2: 93%        Identified Barriers to Discharge/Discharge Goals/Care Management Interventions: s/p PICC placement, voiding trial    Intended Discharge Disposition: dc to [facility]    Expected Discharge Date: 24-48hrs

## 2024-02-09 ENCOUNTER — TELEPHONE (OUTPATIENT)
Dept: INFECTIOUS DISEASES | Facility: CLINIC | Age: 79
End: 2024-02-09

## 2024-02-09 DIAGNOSIS — R78.81 BACTEREMIA: Primary | ICD-10-CM

## 2024-02-09 NOTE — DISCHARGE SUMMARY
[facility]  Discharge- Loida Duarte 1945, 78 y.o. female MRN: [id number]  Unit/Bed#: S -01 Encounter: [id number]  Primary Care Provider: No primary care provider on file.   Date and time admitted to hospital: 1/28/2024  9:19 PM    * Severe sepsis (HCC)  Assessment & Plan  As evidenced by initial fever (100.7), tachycardia (110), tachypnea (26), leukocytosis (30), Lactate 3.8  Source: Urosepsis, bacteremia  CT chest/ABD/pelvis - 6mm calculus in the right ureterovesical junction and moderate hydronephrosis. Signs of pyelonephritis  Procalcitonin - 230  Received 500cc resuscitation fluid in ED, not 30cc/kg given concerns for respiratory status as pt noted to be tachyneic, hypoxic and wheezing after receiving IVF  Blood cultures x 2 drawn - positive for E faecalis and E. coli  UA: +leukocytes, +WBC, occasional bacteria, negative nitrites  Received Vancomycin, Cefepime, Cipro in ED  Blood cultures positive for E coli and enterococcus faecalis, urine culture positive for gram negative rods  LUC without signs of vegetation or endocarditis, PICC placed     Plan:  Continue ampicillin and PICC until final day 2/16/2024, then discontinue PICC  Will need CBC and BMP on 2/14/2024         Elevated troponin  Assessment & Plan  Pt noted to be SOB, wheezing, tachypnea. Endorsed chest pain while in OR.  hsTroponin elevated  BNP - 2,737  CXR unremarkable  Cardiac echo 1/29 showed normal EF 65%, and grade 1 diastolic dysfunction with hypokinesis of basal inferoseptal and inferior walls   Concern for Type II MI per cardiology secondary to sepsis/hypoxia, although there is concern for CAD and she will eventually need cath     Plan:  Patient follow-up with cardiology  Continue aspirin and Plavix  Continue Lipitor and Coreg  Will need ischemic workup in the outpatient setting      Obstructive pyelonephritis  Assessment & Plan  Patient had right-sided ureterovesical calculus and  hydronephrosis  Patient had urology consulted. Cystoscopy ureteroscopy and stent placement completed       Right ureteral calculus  Assessment & Plan  CT chest/ABD/pelvis - 6mm calculus in the right ureterovesical junction and moderate hydronephrosis.  Urology consulted in ED and patient was taken to OR for cysto with stent placement on 1/29/24    Plan:  Continue antibiotics as above  Follow-up with urology for removal of stent    RAKEL (acute kidney injury) (HCC)  Assessment & Plan  Lab Results   Component Value Date    CREATININE 1.16 02/08/2024    CREATININE 1.14 02/07/2024    CREATININE 1.13 02/06/2024      Initial sCr - 2.58  Unknown baseline sCr   Likely worsened in setting of severe sepsis, obstructing renal calculus  Received 500cc bolus in ED, additional fluids initially held given respiratory status, elevated BNP and concern for volume overload.     Today creatinine continue to improve    Plan:  Follow-up PCP     Acute respiratory insufficiency  Assessment & Plan  Pt presented with tachypnea, increased WOB and audible expiratory wheezing, SpO2 88% on room air.  No known pulmonary history, briefly smoked cigarettes many years ago.  Spo2 improved on 2L NC but wheezes persist. Albuterol x1 given in ED with minimal improvement.   LS diminished with wheeze in upper airway, no stridor  CXR - no acute cardiopulmonary abnormalities.  CT chest - No acute findings, no consolidation, pneumothorax, pleural effusion  BNP - 2,737  Initially thought to be volume overload, but now concern for undiagnosed COPD     Plan:  Continued on inhalers  Discharged without oxygen needs  Discharged with PCP and pulmonology follow-up       Anemia  Assessment & Plan  Lab Results   Component Value Date    HGB 9.5 (L) 02/08/2024    HGB 10.0 (L) 02/07/2024    HGB 10.3 (L) 02/06/2024    Plan as above    Heme positive stool  Assessment & Plan  Lab Results   Component Value Date    HGB 9.5 (L) 02/08/2024    HGB 10.0 (L) 02/07/2024    HGB 10.3  (L) 02/06/2024      - Patient had black, heme positive stools on AM of 1/31/24 during hospitalization   - GI consulted, appreciate recommendations   - Underwent EGD on 1/31/24 to rule out upper GI bleed, which revealed 2 ulcers (stomach and duodenum) but no active bleeding-negative for H. pylori    Plan:  Continue pantoprazole 4 mg twice daily  Repeat EGD 3 months outpatient  GI follow-up outpatient    Electrolyte abnormality  Assessment & Plan  Lab Results   Component Value Date    CALCIUM 7.5 (L) 02/08/2024    CALCIUM 7.3 (L) 02/07/2024   Calcium low at this point, previously corrected to normal levels.  Giving vitamin D and calcium supplementation      Hypertension  Assessment & Plan  - Likely undiagnosed chronic hypertension  - Consistently 190s/80s    Plan:  - Started Amlodipine 5 mg daily  Continue Coreg 3.125 twice daily           Medical Problems       Resolved Problems  Date Reviewed: 2/8/2024            Resolved    Thrombocytopenia (HCC) 2/4/2024     Resolved by  Frank Reynolds MD        Discharging Resident: John Omer MD  Discharging Attending: No att. providers found  PCP: No primary care provider on file.  Admission Date:   Admission Orders (From admission, onward)       Ordered        01/29/24 0015  INPATIENT ADMISSION  Once                          Discharge Date: 02/08/24    Consultations During Hospital Stay:  Urology  Case management  Cardiology  Infectious disease  Gastroenterology  Nephrology  Hematology    Procedures Performed:   PICC placement  LUC  Placement of urethral stent    Significant Findings / Test Results:   XR chest portable   Final Result by Julieth Moore MD (02/07 9228)      No acute cardiopulmonary disease.      Right PICC tip at least in the distal right atrium and possibly in the right ventricle. Recommend retracting 3 cm.      I personally discussed this study with Dr. MALLY CORNELIUS on 2/7/2024 2:18 PM.            Workstation performed:  [id number]         RADIOLOGY RESULTS   Final Result by  (02/01 1433)      FL retrograde pyelogram   Final Result by Clint Yanez MD (01/29 9387)      Fluoroscopic guidance provided for right retrograde pyelogram. Please see procedure report for further details.      Workstation performed: [id number]         CT chest abdomen pelvis wo contrast   ED Interpretation by Fred Espinoza MD (01/29 0010)   Read on AD.    CT Chest  IMPRESSION:   1. No acute findings in the chest.   2. For details regarding the abdominal and pelvic findings, refer to the CT abdomen and pelvis report below      CT Abd/Pel  IMPRESSION:   1. 6 mm calculus in the right ureterovesical junction and moderate right hydroureteronephrosis.    2. Cholelithiasis without CT evidence for cholecystitis.   3. Colonic diverticulosis without evidence for diverticulitis.   4. For details regarding the chest findings, refer to the CT chest report above.         Final Result by Marisa Pinon MD (01/29 0841)   Trace right pleural fluid.   Moderate cardiomegaly.   6 mm right UVJ calculus. Moderate right hydroureteronephrosis. Perinephric and periureteral stranding.   Cholelithiasis. No radiographic evidence of cholecystitis.   Colonic diverticulosis.   Other findings, as per the body of the report.         Preliminary report from [facility] was provided on 1/28/2024 at 11:35 p.m.         Workstation performed: [id number]         XR chest 1 view portable   ED Interpretation by Fausto Strickland DO (01/28 6191)   Cardiomegaly, no other acute dz      Final Result by Tereso Briggs MD (01/29 1303)      Enlarged cardiomediastinal silhouette. No apical pleural capping.                  Workstation performed: [id number]         TTE demonstrating thickened atrial and mitral valves, follow-up with LUC demonstrating thickened valves without vegetations or signs of endocarditis.  Blood cultures growing E faecalis/E. coli bacteremia  Blood cultures prior to  discharge without growth at 72 hours    Incidental Findings:   None     Test Results Pending at Discharge (will require follow up):  None     Outpatient Tests Requested:  CBC with differential and CMP on 2/14/2023  Repeat EGD in 3 months, following up with GI    Complications: None    Reason for Admission: Black emesis    Hospital Course:   Loida Duarte is a 78 y.o. female patient who originally presented to the hospital on 1/28/2024 due to black vomiting.  On arrival patient was tachypneic and hypoxic to 88% on room air with audible wheezing.  She was placed on 2 L nasal cannula with improvement of saturation but tachypnea and wheezing persisted.  Had elevated lactate, Pro-Ken, WBCs.  EKG showed sinus tachycardia with left bundle branch block and left anterior fascicular block.  Cultures were drawn at that time.  Patient had CT abdomen pelvis noted for 6 mm calculus in the right ureterovesicular junction with moderate hydronephrosis.  Patient was taken to the OR immediately with placement of right-sided stent.  Went to the ICU hemodynamically stable but still tachypneic and was found to be in fluid overload BNP 2737 and EKG elevated to approximately 15,000.  Cardiology was consulted and patient was placed on aspirin, Plavix, statin and noted that she would need cardiac catheterization later.  Echocardiogram revealed normal left ventricular function with hypokinesis of basal and inferior walls likely suggestive of NSTEMI.  ID was consulted for aid with bacterial antibiotics, placed on broad-spectrum initially.  With return of blood cultures revealing E. coli/E faecalis bacteremia, patient was started on IV ampicillin after oral amoxicillin challenge secondary to previously documented allergy.  GI was consulted for evaluation of possible dark stools, EGD was done and showed esophagitis, gastric ulcers, duodenitis without any active bleeds, patient was prompted to follow with the EGD in 3 months.  Nephrology was  "consulted as well due to patient having RAKEL, was placed on appropriate fluid management.  As patient improved, was moved out of the ICU and moved down to normal [facility] floor, had continuous improvement.  Once blood cultures were negative for 72 hours, patient had PICC line and was cleared by infectious disease for outpatient antibiotics.  Hematology oncology was also consulted for patient management secondary to elevated white count, determined to be secondary to infection and not underlying hematologic pathology.    Patient was discharged with addition of multiple medicines, see medication list for medications, routes, concentrations, and indications.  Patient was also discharged with multiple follow-ups including establishment of PCP, cardiology, gastroenterology, urology, pulmonology.  Patient was also encouraged to have an EGD repeated in 3 months to follow-up results of previous endoscopic procedure.  Patient also to have CBC with differential and BMP on 2/14/2024 prior to removal of PICC on 2/16/2024 which will be last date of ampicillin.        Please see above list of diagnoses and related plan for additional information.     Condition at Discharge: stable    Discharge Day Visit / Exam:   Subjective: Patient this morning, stated that she was doing well.  Had no shortness of breath, chest pains, palpitations, abdominal pains, dysuria, changes in bowel movements, fevers, chills, nausea, vomiting.  Stated that she was eating well, and was excited to be able to leave the hospital today.  Vitals: Blood Pressure: 160/69 (02/08/24 0711)  Pulse: 82 (02/08/24 0711)  Temperature: 99.4 °F (37.4 °C) (02/08/24 0711)  Temp Source: Oral (02/08/24 0711)  Respirations: 20 (02/08/24 0711)  Height: 4' 9.01\" (144.8 cm) (02/05/24 1210)  Weight - Scale: 115 kg (253 lb 15.5 oz) (02/08/24 0600)  SpO2: 93 % (02/08/24 0711)  Exam:   Physical Exam  Vitals and nursing note reviewed.   Constitutional:       General: She is not in acute " distress.     Appearance: She is well-developed. She is obese.   HENT:      Head: Normocephalic and atraumatic.   Eyes:      Conjunctiva/sclera: Conjunctivae normal.   Cardiovascular:      Rate and Rhythm: Normal rate and regular rhythm.      Heart sounds: Murmur heard.   Pulmonary:      Effort: Pulmonary effort is normal. No respiratory distress.      Breath sounds: Normal breath sounds.      Comments: Auscultated anteriorly due to patient's body habitus  Tory wheezing while speaking      Abdominal:      General: Bowel sounds are normal.      Palpations: Abdomen is soft.      Tenderness: There is no abdominal tenderness.      Comments: Bruising noticed on left abdomen, patient states that this is where she gets her heparin shots, and also had minor trauma this morning when readjusting in bed   Musculoskeletal:         General: No swelling.   Skin:     General: Skin is warm and dry.      Findings: Bruising (arms, healing) present.   Neurological:      Mental Status: She is alert and oriented to person, place, and time.   Psychiatric:         Mood and Affect: Mood normal.          Discussion with Family: Updated  (daughter) at bedside.    Discharge instructions/Information to patient and family:   See after visit summary for information provided to patient and family.      Provisions for Follow-Up Care:  See after visit summary for information related to follow-up care and any pertinent home health orders.      Mobility at time of Discharge:   Basic Mobility Inpatient Raw Score: 10  JH-HLM Goal: 4: Move to chair/commode  JH-HLM Achieved: 2: Bed activities/Dependent transfer  HLM Goal NOT achieved. Continue to encourage mobility in post discharge setting.     Disposition:   Other Skilled Nursing Facility at [place]    Planned Readmission: no    Discharge Medications:  See after visit summary for reconciled discharge medications provided to patient and/or family.      **Please Note: This note may have  been constructed using a voice recognition system**

## 2024-02-09 NOTE — ASSESSMENT & PLAN NOTE
Lab Results   Component Value Date    CREATININE 1.16 02/08/2024    CREATININE 1.14 02/07/2024    CREATININE 1.13 02/06/2024      Initial sCr - 2.58  Unknown baseline sCr   Likely worsened in setting of severe sepsis, obstructing renal calculus  Received 500cc bolus in ED, additional fluids initially held given respiratory status, elevated BNP and concern for volume overload.     Today creatinine continue to improve    Plan:  Follow-up PCP

## 2024-02-09 NOTE — ASSESSMENT & PLAN NOTE
- Likely undiagnosed chronic hypertension  - Consistently 190s/80s    Plan:  - Started Amlodipine 5 mg daily  Continue Coreg 3.125 twice daily

## 2024-02-09 NOTE — ASSESSMENT & PLAN NOTE
Lab Results   Component Value Date    CALCIUM 7.5 (L) 02/08/2024    CALCIUM 7.3 (L) 02/07/2024   Calcium low at this point, previously corrected to normal levels.  Giving vitamin D and calcium supplementation

## 2024-02-09 NOTE — ASSESSMENT & PLAN NOTE
Lab Results   Component Value Date    HGB 9.5 (L) 02/08/2024    HGB 10.0 (L) 02/07/2024    HGB 10.3 (L) 02/06/2024    Plan as above

## 2024-02-09 NOTE — ASSESSMENT & PLAN NOTE
Lab Results   Component Value Date    HGB 9.5 (L) 02/08/2024    HGB 10.0 (L) 02/07/2024    HGB 10.3 (L) 02/06/2024      - Patient had black, heme positive stools on AM of 1/31/24 during hospitalization   - GI consulted, appreciate recommendations   - Underwent EGD on 1/31/24 to rule out upper GI bleed, which revealed 2 ulcers (stomach and duodenum) but no active bleeding-negative for H. pylori    Plan:  Continue pantoprazole 4 mg twice daily  Repeat EGD 3 months outpatient  GI follow-up outpatient

## 2024-02-09 NOTE — ASSESSMENT & PLAN NOTE
Pt presented with tachypnea, increased WOB and audible expiratory wheezing, SpO2 88% on room air.  No known pulmonary history, briefly smoked cigarettes many years ago.  Spo2 improved on 2L NC but wheezes persist. Albuterol x1 given in ED with minimal improvement.   LS diminished with wheeze in upper airway, no stridor  CXR - no acute cardiopulmonary abnormalities.  CT chest - No acute findings, no consolidation, pneumothorax, pleural effusion  BNP - 2,737  Initially thought to be volume overload, but now concern for undiagnosed COPD     Plan:  Continued on inhalers  Discharged without oxygen needs  Discharged with PCP and pulmonology follow-up

## 2024-02-09 NOTE — ASSESSMENT & PLAN NOTE
Patient had right-sided ureterovesical calculus and hydronephrosis  Patient had urology consulted. Cystoscopy ureteroscopy and stent placement completed      Please call pt. I ordered doxy good for sinusitis and bronchitis. ini or if having difficulty breathing please have her make a follow up appt.

## 2024-02-09 NOTE — PROGRESS NOTES
OPAT NOTE    Supervising/Discharge physician:Danilo     Diagnosis: Bacteremia     Drug: ampicillin     Dose/Frequency: 2gIVQ6    Labs/Frequency: CBCD CMP in one week    End Date: 2/16    Infusion/VNA contact: Sim SOLITARIO     Next appointment: N/A        MA assigned: Julianne

## 2024-02-09 NOTE — ASSESSMENT & PLAN NOTE
CT chest/ABD/pelvis - 6mm calculus in the right ureterovesical junction and moderate hydronephrosis.  Urology consulted in ED and patient was taken to OR for cysto with stent placement on 1/29/24    Plan:  Continue antibiotics as above  Follow-up with urology for removal of stent

## 2024-02-09 NOTE — ASSESSMENT & PLAN NOTE
As evidenced by initial fever (100.7), tachycardia (110), tachypnea (26), leukocytosis (30), Lactate 3.8  Source: Urosepsis, bacteremia  CT chest/ABD/pelvis - 6mm calculus in the right ureterovesical junction and moderate hydronephrosis. Signs of pyelonephritis  Procalcitonin - 230  Received 500cc resuscitation fluid in ED, not 30cc/kg given concerns for respiratory status as pt noted to be tachyneic, hypoxic and wheezing after receiving IVF  Blood cultures x 2 drawn - positive for E faecalis and E. coli  UA: +leukocytes, +WBC, occasional bacteria, negative nitrites  Received Vancomycin, Cefepime, Cipro in ED  Blood cultures positive for E coli and enterococcus faecalis, urine culture positive for gram negative rods  LUC without signs of vegetation or endocarditis, PICC placed     Plan:  Continue ampicillin and PICC until final day 2/16/2024, then discontinue PICC  Will need CBC and BMP on 2/14/2024

## 2024-02-09 NOTE — TELEPHONE ENCOUNTER
Called [facility] and spoke with Lety today.   Informed Lety I faxed patient antibiotic plan and orders to facility. Lety states they were received. Went over patients antibiotic plan and labs at this time. Informed Lety will not need follow up with ID since it is a short course.   Informed Lety if there are any further questions or concerns to call the office.   Lety verbalizes understanding at this time.

## 2024-02-09 NOTE — ASSESSMENT & PLAN NOTE
Pt noted to be SOB, wheezing, tachypnea. Endorsed chest pain while in OR.  hsTroponin elevated  BNP - 2,737  CXR unremarkable  Cardiac echo 1/29 showed normal EF 65%, and grade 1 diastolic dysfunction with hypokinesis of basal inferoseptal and inferior walls   Concern for Type II MI per cardiology secondary to sepsis/hypoxia, although there is concern for CAD and she will eventually need cath     Plan:  Patient follow-up with cardiology  Continue aspirin and Plavix  Continue Lipitor and Coreg  Will need ischemic workup in the outpatient setting

## 2024-02-12 ENCOUNTER — PREP FOR PROCEDURE (OUTPATIENT)
Dept: GASTROENTEROLOGY | Facility: AMBULARY SURGERY CENTER | Age: 79
End: 2024-02-12

## 2024-02-12 ENCOUNTER — PREP FOR PROCEDURE (OUTPATIENT)
Dept: UROLOGY | Facility: CLINIC | Age: 79
End: 2024-02-12

## 2024-02-12 DIAGNOSIS — N20.1 CALCULUS OF URETER: Primary | ICD-10-CM

## 2024-02-12 DIAGNOSIS — K25.9 GASTRIC ULCER, UNSPECIFIED CHRONICITY, UNSPECIFIED WHETHER GASTRIC ULCER HEMORRHAGE OR PERFORATION PRESENT: Primary | ICD-10-CM

## 2024-02-12 DIAGNOSIS — R39.89 SUSPECTED UTI: ICD-10-CM

## 2024-02-15 ENCOUNTER — TELEPHONE (OUTPATIENT)
Dept: INFECTIOUS DISEASES | Facility: CLINIC | Age: 79
End: 2024-02-15

## 2024-02-15 NOTE — TELEPHONE ENCOUNTER
Called [facility] and spoke with Ivonne today.   Informed Ivonne I was calling regarding patients labs. Ivonne states patient had labs done on 2/12/24. Informed Ivonne to fax lab results to office and office fax number provided.   Ivonne verbalizes understanding at this time.

## 2024-02-20 ENCOUNTER — TELEPHONE (OUTPATIENT)
Dept: CASE MANAGEMENT | Facility: HOSPITAL | Age: 79
End: 2024-02-20

## 2024-02-20 NOTE — TELEPHONE ENCOUNTER
"CM received a Vm from pt's son, Missy. Missy requesting f/u regarding pt applying for Medicaid.     CM spoke with [employer] department. They have been trying to contact pt's home number which is \"not in service.\" To assist with applying pt for MA. CM providing PATHs with son's phone number. As per [employer] so can also call their main number: [phone] and ask for Muna.    CM contacted Pt's son, Missy. Updated  on above information. CM providing him with [employer] phone number as well.   "

## 2024-02-21 ENCOUNTER — TELEPHONE (OUTPATIENT)
Dept: GASTROENTEROLOGY | Facility: AMBULARY SURGERY CENTER | Age: 79
End: 2024-02-21

## 2024-02-21 PROBLEM — N11.1 OBSTRUCTIVE PYELONEPHRITIS: Status: RESOLVED | Noted: 2024-01-29 | Resolved: 2024-02-21

## 2024-02-21 PROBLEM — R65.20 SEVERE SEPSIS (HCC): Status: RESOLVED | Noted: 2024-01-29 | Resolved: 2024-02-21

## 2024-02-21 PROBLEM — A41.9 SEVERE SEPSIS (HCC): Status: RESOLVED | Noted: 2024-01-29 | Resolved: 2024-02-21

## 2024-02-21 NOTE — TELEPHONE ENCOUNTER
----- Message from Luci Chakraborty PA-C sent at 2/1/2024  1:42 PM EST -----  Please call patient for repeat EGD in 3 months with Dr. Hernandez once discharged for gastric ulcer and esophagitis.  She will need cardiac clearance to hold blood thinners with Plavix.  Thank you

## (undated) DEVICE — GLOVE SRG BIOGEL ECLIPSE 7.5

## (undated) DEVICE — STERILE SURGICAL LUBRICANT,  TUBE: Brand: SURGILUBE

## (undated) DEVICE — PREMIUM DRY TRAY LF: Brand: MEDLINE INDUSTRIES, INC.

## (undated) DEVICE — GUIDEWIRE STRGHT TIP 0.035 IN  SOLO PLUS

## (undated) DEVICE — CATH URETERAL 5FR X 70 CM FLEX TIP POLYUR BARD

## (undated) DEVICE — SPECIMEN CONTAINER STERILE PEEL PACK

## (undated) DEVICE — GLOVE INDICATOR PI UNDERGLOVE SZ 8 BLUE

## (undated) DEVICE — CHLORHEXIDINE 4PCT 4 OZ

## (undated) DEVICE — PACK TUR